# Patient Record
Sex: MALE | Race: WHITE | NOT HISPANIC OR LATINO | Employment: OTHER | URBAN - METROPOLITAN AREA
[De-identification: names, ages, dates, MRNs, and addresses within clinical notes are randomized per-mention and may not be internally consistent; named-entity substitution may affect disease eponyms.]

---

## 2023-04-20 LAB
ALANINE AMINOTRANSFERASE (SGPT) (U/L) IN SER/PLAS: 14 U/L (ref 10–52)
ALBUMIN (G/DL) IN SER/PLAS: 3.7 G/DL (ref 3.4–5)
ALKALINE PHOSPHATASE (U/L) IN SER/PLAS: 78 U/L (ref 33–136)
ANION GAP IN SER/PLAS: 11 MMOL/L (ref 10–20)
ASPARTATE AMINOTRANSFERASE (SGOT) (U/L) IN SER/PLAS: 20 U/L (ref 9–39)
BILIRUBIN TOTAL (MG/DL) IN SER/PLAS: 0.4 MG/DL (ref 0–1.2)
CALCIUM (MG/DL) IN SER/PLAS: 9.3 MG/DL (ref 8.6–10.3)
CARBON DIOXIDE, TOTAL (MMOL/L) IN SER/PLAS: 26 MMOL/L (ref 21–32)
CHLORIDE (MMOL/L) IN SER/PLAS: 104 MMOL/L (ref 98–107)
CHOLESTEROL (MG/DL) IN SER/PLAS: 177 MG/DL (ref 0–199)
CHOLESTEROL IN HDL (MG/DL) IN SER/PLAS: 41.9 MG/DL
CHOLESTEROL/HDL RATIO: 4.2
CREATININE (MG/DL) IN SER/PLAS: 0.8 MG/DL (ref 0.5–1.3)
ERYTHROCYTE DISTRIBUTION WIDTH (RATIO) BY AUTOMATED COUNT: 14.1 % (ref 11.5–14.5)
ERYTHROCYTE MEAN CORPUSCULAR HEMOGLOBIN CONCENTRATION (G/DL) BY AUTOMATED: 31.8 G/DL (ref 32–36)
ERYTHROCYTE MEAN CORPUSCULAR VOLUME (FL) BY AUTOMATED COUNT: 91 FL (ref 80–100)
ERYTHROCYTES (10*6/UL) IN BLOOD BY AUTOMATED COUNT: 4.92 X10E12/L (ref 4.5–5.9)
GFR MALE: >90 ML/MIN/1.73M2
GLUCOSE (MG/DL) IN SER/PLAS: 100 MG/DL (ref 74–99)
HEMATOCRIT (%) IN BLOOD BY AUTOMATED COUNT: 44.7 % (ref 41–52)
HEMOGLOBIN (G/DL) IN BLOOD: 14.2 G/DL (ref 13.5–17.5)
LDL: 111 MG/DL (ref 0–99)
LEUKOCYTES (10*3/UL) IN BLOOD BY AUTOMATED COUNT: 10 X10E9/L (ref 4.4–11.3)
PLATELETS (10*3/UL) IN BLOOD AUTOMATED COUNT: 435 X10E9/L (ref 150–450)
POTASSIUM (MMOL/L) IN SER/PLAS: 4 MMOL/L (ref 3.5–5.3)
PROTEIN TOTAL: 7.3 G/DL (ref 6.4–8.2)
SODIUM (MMOL/L) IN SER/PLAS: 137 MMOL/L (ref 136–145)
THYROTROPIN (MIU/L) IN SER/PLAS BY DETECTION LIMIT <= 0.05 MIU/L: 2.94 MIU/L (ref 0.44–3.98)
TRIGLYCERIDE (MG/DL) IN SER/PLAS: 122 MG/DL (ref 0–149)
UREA NITROGEN (MG/DL) IN SER/PLAS: 12 MG/DL (ref 6–23)
VLDL: 24 MG/DL (ref 0–40)

## 2023-04-21 LAB
ESTIMATED AVERAGE GLUCOSE FOR HBA1C: 134 MG/DL
HEMOGLOBIN A1C/HEMOGLOBIN TOTAL IN BLOOD: 6.3 %
PROSTATE SPECIFIC ANTIGEN,SCREEN: 2.31 NG/ML (ref 0–4)

## 2023-05-16 LAB
PROTEIN C ACTUAL/NORMAL IN PPP BY COAGULATION ASSAY: 115 %ACTIVITY (ref 70–150)
PROTEIN S ACTUAL/NORMAL IN PPP BY COAGULATION ASSAY: >150 %ACTIVITY (ref 64–150)

## 2023-05-19 LAB — ANTITHROMBIN ANTIGEN: 87 % (ref 82–136)

## 2023-08-19 LAB
ANION GAP IN SER/PLAS: 12 MMOL/L (ref 10–20)
CALCIUM (MG/DL) IN SER/PLAS: 9.2 MG/DL (ref 8.6–10.3)
CARBON DIOXIDE, TOTAL (MMOL/L) IN SER/PLAS: 24 MMOL/L (ref 21–32)
CHLORIDE (MMOL/L) IN SER/PLAS: 102 MMOL/L (ref 98–107)
CREATININE (MG/DL) IN SER/PLAS: 0.83 MG/DL (ref 0.5–1.3)
GFR MALE: >90 ML/MIN/1.73M2
GLUCOSE (MG/DL) IN SER/PLAS: 109 MG/DL (ref 74–99)
POTASSIUM (MMOL/L) IN SER/PLAS: 4 MMOL/L (ref 3.5–5.3)
SODIUM (MMOL/L) IN SER/PLAS: 134 MMOL/L (ref 136–145)
UREA NITROGEN (MG/DL) IN SER/PLAS: 14 MG/DL (ref 6–23)

## 2023-09-09 PROBLEM — I10 HYPERTENSION: Status: ACTIVE | Noted: 2023-09-09

## 2023-09-09 PROBLEM — I48.0 PAROXYSMAL ATRIAL FIBRILLATION (MULTI): Status: ACTIVE | Noted: 2023-09-09

## 2023-09-09 PROBLEM — R60.0 LEG EDEMA: Status: ACTIVE | Noted: 2023-09-09

## 2023-09-09 PROBLEM — R73.02 IMPAIRED GLUCOSE TOLERANCE: Status: ACTIVE | Noted: 2023-09-09

## 2023-09-09 PROBLEM — M54.50 LOW BACK PAIN WITH RADIATION: Status: ACTIVE | Noted: 2023-09-09

## 2023-09-09 PROBLEM — E66.01 MORBID OBESITY DUE TO EXCESS CALORIES (MULTI): Status: ACTIVE | Noted: 2023-09-09

## 2023-09-09 PROBLEM — M96.1 POSTLAMINECTOMY SYNDROME OF LUMBOSACRAL REGION: Status: ACTIVE | Noted: 2023-09-09

## 2023-09-09 PROBLEM — M51.369 DEGENERATION OF LUMBAR INTERVERTEBRAL DISC: Status: ACTIVE | Noted: 2023-09-09

## 2023-09-09 PROBLEM — G47.33 OBSTRUCTIVE SLEEP APNEA SYNDROME: Status: ACTIVE | Noted: 2023-09-09

## 2023-09-09 PROBLEM — M19.90 OSTEOARTHRITIS: Status: ACTIVE | Noted: 2023-09-09

## 2023-09-09 PROBLEM — M51.36 DEGENERATION OF LUMBAR INTERVERTEBRAL DISC: Status: ACTIVE | Noted: 2023-09-09

## 2023-09-09 PROBLEM — K21.9 GASTROESOPHAGEAL REFLUX DISEASE: Status: ACTIVE | Noted: 2023-09-09

## 2023-09-09 PROBLEM — R06.02 SOB (SHORTNESS OF BREATH): Status: ACTIVE | Noted: 2023-09-09

## 2023-09-09 PROBLEM — Z86.711 HISTORY OF PULMONARY EMBOLISM: Status: ACTIVE | Noted: 2023-09-09

## 2023-09-09 PROBLEM — F41.8 MIXED ANXIETY AND DEPRESSIVE DISORDER: Status: ACTIVE | Noted: 2023-09-09

## 2023-09-09 PROBLEM — F41.9 ANXIETY: Status: ACTIVE | Noted: 2023-09-09

## 2023-09-09 PROBLEM — F40.00 AGORAPHOBIA: Status: ACTIVE | Noted: 2023-09-09

## 2023-09-09 PROBLEM — N52.9 ERECTILE DYSFUNCTION: Status: ACTIVE | Noted: 2023-09-09

## 2023-09-09 PROBLEM — E78.2 MIXED HYPERLIPIDEMIA: Status: ACTIVE | Noted: 2023-09-09

## 2023-09-09 PROBLEM — M79.7 FIBROMYALGIA: Status: ACTIVE | Noted: 2023-09-09

## 2023-09-09 RX ORDER — ESCITALOPRAM OXALATE 20 MG/1
TABLET ORAL
COMMUNITY
Start: 2021-07-04 | End: 2023-12-28 | Stop reason: ALTCHOICE

## 2023-09-09 RX ORDER — LISINOPRIL 10 MG/1
10 TABLET ORAL DAILY
COMMUNITY
Start: 2018-11-26

## 2023-09-09 RX ORDER — IBUPROFEN 800 MG/1
800 TABLET ORAL 3 TIMES DAILY PRN
COMMUNITY
End: 2023-12-28 | Stop reason: ALTCHOICE

## 2023-09-09 RX ORDER — METOPROLOL TARTRATE 25 MG/1
25 TABLET, FILM COATED ORAL
COMMUNITY
Start: 2020-06-30

## 2023-09-09 RX ORDER — HYDROCODONE BITARTRATE AND ACETAMINOPHEN 5; 300 MG/1; MG/1
1 TABLET ORAL EVERY 6 HOURS
COMMUNITY
End: 2023-12-28 | Stop reason: ALTCHOICE

## 2023-09-09 RX ORDER — SERTRALINE HYDROCHLORIDE 100 MG/1
100 TABLET, FILM COATED ORAL DAILY
COMMUNITY
Start: 2022-05-12

## 2023-09-09 RX ORDER — OXYCODONE AND ACETAMINOPHEN 5; 325 MG/1; MG/1
1 TABLET ORAL EVERY 6 HOURS
COMMUNITY
Start: 2016-06-09 | End: 2023-12-28 | Stop reason: ALTCHOICE

## 2023-09-09 RX ORDER — PANTOPRAZOLE SODIUM 40 MG/1
1 TABLET, DELAYED RELEASE ORAL DAILY
COMMUNITY
End: 2023-12-28 | Stop reason: ALTCHOICE

## 2023-09-09 RX ORDER — BENZONATATE 200 MG/1
200 CAPSULE ORAL 3 TIMES DAILY PRN
COMMUNITY
End: 2023-12-28 | Stop reason: ALTCHOICE

## 2023-09-09 RX ORDER — CEPHALEXIN 500 MG/1
500 CAPSULE ORAL 2 TIMES DAILY
COMMUNITY
Start: 2016-06-09 | End: 2024-01-07 | Stop reason: HOSPADM

## 2023-09-09 RX ORDER — DIAZEPAM 5 MG/1
TABLET ORAL
COMMUNITY
Start: 2020-10-19 | End: 2023-12-28 | Stop reason: ALTCHOICE

## 2023-09-09 RX ORDER — AMOXICILLIN AND CLAVULANATE POTASSIUM 875; 125 MG/1; MG/1
875 TABLET, FILM COATED ORAL EVERY 12 HOURS
COMMUNITY
End: 2023-12-28 | Stop reason: ALTCHOICE

## 2023-09-09 RX ORDER — SULFAMETHOXAZOLE AND TRIMETHOPRIM 800; 160 MG/1; MG/1
TABLET ORAL
COMMUNITY
Start: 2021-06-30 | End: 2024-01-07 | Stop reason: HOSPADM

## 2023-09-09 RX ORDER — FLUTICASONE PROPIONATE 50 MCG
2 SPRAY, SUSPENSION (ML) NASAL DAILY
COMMUNITY
Start: 2022-06-22 | End: 2023-12-28 | Stop reason: ALTCHOICE

## 2023-09-09 RX ORDER — MONTELUKAST SODIUM 10 MG/1
1 TABLET ORAL DAILY
COMMUNITY
Start: 2022-06-22 | End: 2023-12-28 | Stop reason: ALTCHOICE

## 2023-09-09 RX ORDER — GABAPENTIN 600 MG/1
600 TABLET ORAL 3 TIMES DAILY
COMMUNITY
Start: 2021-07-05 | End: 2024-05-28

## 2023-09-09 RX ORDER — SILDENAFIL 100 MG/1
100 TABLET, FILM COATED ORAL DAILY PRN
COMMUNITY
Start: 2022-05-12

## 2023-09-12 ENCOUNTER — HOSPITAL ENCOUNTER (OUTPATIENT)
Dept: DATA CONVERSION | Facility: HOSPITAL | Age: 63
Discharge: HOME | End: 2023-09-12
Payer: COMMERCIAL

## 2023-09-12 DIAGNOSIS — Z79.899 OTHER LONG TERM (CURRENT) DRUG THERAPY: ICD-10-CM

## 2023-09-12 DIAGNOSIS — M54.50 LOW BACK PAIN, UNSPECIFIED: ICD-10-CM

## 2023-09-12 DIAGNOSIS — Z96.641 PRESENCE OF RIGHT ARTIFICIAL HIP JOINT: ICD-10-CM

## 2023-09-12 DIAGNOSIS — M54.16 RADICULOPATHY, LUMBAR REGION: ICD-10-CM

## 2023-09-12 DIAGNOSIS — Z79.01 LONG TERM (CURRENT) USE OF ANTICOAGULANTS: ICD-10-CM

## 2023-09-12 DIAGNOSIS — M25.551 PAIN IN RIGHT HIP: ICD-10-CM

## 2023-10-09 ENCOUNTER — APPOINTMENT (OUTPATIENT)
Dept: ORTHOPEDIC SURGERY | Facility: CLINIC | Age: 63
End: 2023-10-09
Payer: COMMERCIAL

## 2023-10-17 ENCOUNTER — APPOINTMENT (OUTPATIENT)
Dept: ORTHOPEDIC SURGERY | Facility: CLINIC | Age: 63
End: 2023-10-17
Payer: COMMERCIAL

## 2023-12-13 RX ORDER — POLYETHYLENE GLYCOL 3350, SODIUM CHLORIDE, SODIUM BICARBONATE, POTASSIUM CHLORIDE 420; 11.2; 5.72; 1.48 G/4L; G/4L; G/4L; G/4L
POWDER, FOR SOLUTION ORAL
Status: ON HOLD | COMMUNITY
Start: 2023-04-13 | End: 2024-01-04 | Stop reason: WASHOUT

## 2023-12-13 RX ORDER — POLYETHYLENE GLYCOL 3350, SODIUM SULFATE, SODIUM CHLORIDE, POTASSIUM CHLORIDE, SODIUM ASCORBATE, AND ASCORBIC ACID 7.5-2.691G
KIT ORAL
Status: ON HOLD | COMMUNITY
Start: 2023-04-10 | End: 2024-01-04 | Stop reason: WASHOUT

## 2023-12-13 RX ORDER — AMOXICILLIN 500 MG/1
CAPSULE ORAL
COMMUNITY
Start: 2023-08-08 | End: 2023-12-28 | Stop reason: ALTCHOICE

## 2023-12-13 RX ORDER — PREDNISONE 20 MG/1
TABLET ORAL
COMMUNITY
Start: 2023-09-02 | End: 2023-12-28 | Stop reason: ALTCHOICE

## 2023-12-13 RX ORDER — AZITHROMYCIN 500 MG/1
TABLET, FILM COATED ORAL
COMMUNITY
Start: 2023-03-11 | End: 2023-12-28 | Stop reason: ALTCHOICE

## 2023-12-13 RX ORDER — METHOCARBAMOL 750 MG/1
750 TABLET, FILM COATED ORAL 4 TIMES DAILY PRN
Status: ON HOLD | COMMUNITY
Start: 2023-09-02 | End: 2024-01-04 | Stop reason: WASHOUT

## 2023-12-13 RX ORDER — ACETAMINOPHEN 500 MG
1000 TABLET ORAL EVERY 8 HOURS PRN
COMMUNITY
Start: 2017-12-08

## 2023-12-22 PROCEDURE — 99284 EMERGENCY DEPT VISIT MOD MDM: CPT | Performed by: EMERGENCY MEDICINE

## 2023-12-22 PROCEDURE — 96365 THER/PROPH/DIAG IV INF INIT: CPT

## 2023-12-22 ASSESSMENT — PAIN DESCRIPTION - DESCRIPTORS: DESCRIPTORS: ACHING

## 2023-12-22 ASSESSMENT — PAIN DESCRIPTION - PAIN TYPE: TYPE: ACUTE PAIN

## 2023-12-22 ASSESSMENT — PAIN DESCRIPTION - ORIENTATION: ORIENTATION: LEFT

## 2023-12-22 ASSESSMENT — PAIN DESCRIPTION - LOCATION: LOCATION: LEG

## 2023-12-22 ASSESSMENT — PAIN - FUNCTIONAL ASSESSMENT: PAIN_FUNCTIONAL_ASSESSMENT: 0-10

## 2023-12-22 ASSESSMENT — PAIN SCALES - GENERAL: PAINLEVEL_OUTOF10: 9

## 2023-12-23 ENCOUNTER — HOSPITAL ENCOUNTER (EMERGENCY)
Facility: HOSPITAL | Age: 63
Discharge: HOME | End: 2023-12-23
Attending: EMERGENCY MEDICINE
Payer: COMMERCIAL

## 2023-12-23 ENCOUNTER — APPOINTMENT (OUTPATIENT)
Dept: RADIOLOGY | Facility: HOSPITAL | Age: 63
End: 2023-12-23
Payer: COMMERCIAL

## 2023-12-23 VITALS
BODY MASS INDEX: 37.11 KG/M2 | DIASTOLIC BLOOD PRESSURE: 82 MMHG | HEART RATE: 52 BPM | HEIGHT: 73 IN | OXYGEN SATURATION: 99 % | WEIGHT: 280 LBS | RESPIRATION RATE: 16 BRPM | TEMPERATURE: 97.3 F | SYSTOLIC BLOOD PRESSURE: 143 MMHG

## 2023-12-23 DIAGNOSIS — R79.89 ELEVATED D-DIMER: ICD-10-CM

## 2023-12-23 DIAGNOSIS — L03.116 CELLULITIS OF LEFT LOWER EXTREMITY: Primary | ICD-10-CM

## 2023-12-23 LAB
ALBUMIN SERPL BCP-MCNC: 3.6 G/DL (ref 3.4–5)
ALP SERPL-CCNC: 66 U/L (ref 33–136)
ALT SERPL W P-5'-P-CCNC: 12 U/L (ref 10–52)
ANION GAP SERPL CALC-SCNC: 11 MMOL/L (ref 10–20)
AST SERPL W P-5'-P-CCNC: 22 U/L (ref 9–39)
BASOPHILS # BLD AUTO: 0.06 X10*3/UL (ref 0–0.1)
BASOPHILS NFR BLD AUTO: 0.7 %
BILIRUB SERPL-MCNC: 0.4 MG/DL (ref 0–1.2)
BUN SERPL-MCNC: 20 MG/DL (ref 6–23)
CALCIUM SERPL-MCNC: 8.8 MG/DL (ref 8.6–10.3)
CHLORIDE SERPL-SCNC: 104 MMOL/L (ref 98–107)
CO2 SERPL-SCNC: 25 MMOL/L (ref 21–32)
CREAT SERPL-MCNC: 0.84 MG/DL (ref 0.5–1.3)
D DIMER PPP FEU-MCNC: 1094 NG/ML FEU
EOSINOPHIL # BLD AUTO: 0.22 X10*3/UL (ref 0–0.7)
EOSINOPHIL NFR BLD AUTO: 2.5 %
ERYTHROCYTE [DISTWIDTH] IN BLOOD BY AUTOMATED COUNT: 13.6 % (ref 11.5–14.5)
GFR SERPL CREATININE-BSD FRML MDRD: >90 ML/MIN/1.73M*2
GLUCOSE SERPL-MCNC: 110 MG/DL (ref 74–99)
HCT VFR BLD AUTO: 39.6 % (ref 41–52)
HGB BLD-MCNC: 12.7 G/DL (ref 13.5–17.5)
IMM GRANULOCYTES # BLD AUTO: 0.06 X10*3/UL (ref 0–0.7)
IMM GRANULOCYTES NFR BLD AUTO: 0.7 % (ref 0–0.9)
LYMPHOCYTES # BLD AUTO: 2.48 X10*3/UL (ref 1.2–4.8)
LYMPHOCYTES NFR BLD AUTO: 27.6 %
MCH RBC QN AUTO: 28.9 PG (ref 26–34)
MCHC RBC AUTO-ENTMCNC: 32.1 G/DL (ref 32–36)
MCV RBC AUTO: 90 FL (ref 80–100)
MONOCYTES # BLD AUTO: 1.2 X10*3/UL (ref 0.1–1)
MONOCYTES NFR BLD AUTO: 13.4 %
NEUTROPHILS # BLD AUTO: 4.95 X10*3/UL (ref 1.2–7.7)
NEUTROPHILS NFR BLD AUTO: 55.1 %
NRBC BLD-RTO: 0 /100 WBCS (ref 0–0)
PLATELET # BLD AUTO: 387 X10*3/UL (ref 150–450)
POTASSIUM SERPL-SCNC: 3.9 MMOL/L (ref 3.5–5.3)
PROT SERPL-MCNC: 7.4 G/DL (ref 6.4–8.2)
RBC # BLD AUTO: 4.39 X10*6/UL (ref 4.5–5.9)
SODIUM SERPL-SCNC: 136 MMOL/L (ref 136–145)
WBC # BLD AUTO: 9 X10*3/UL (ref 4.4–11.3)

## 2023-12-23 PROCEDURE — 85379 FIBRIN DEGRADATION QUANT: CPT | Performed by: EMERGENCY MEDICINE

## 2023-12-23 PROCEDURE — 2500000004 HC RX 250 GENERAL PHARMACY W/ HCPCS (ALT 636 FOR OP/ED): Performed by: EMERGENCY MEDICINE

## 2023-12-23 PROCEDURE — 85025 COMPLETE CBC W/AUTO DIFF WBC: CPT | Performed by: EMERGENCY MEDICINE

## 2023-12-23 PROCEDURE — 2500000001 HC RX 250 WO HCPCS SELF ADMINISTERED DRUGS (ALT 637 FOR MEDICARE OP): Performed by: EMERGENCY MEDICINE

## 2023-12-23 PROCEDURE — 93970 EXTREMITY STUDY: CPT

## 2023-12-23 PROCEDURE — 93971 EXTREMITY STUDY: CPT | Performed by: RADIOLOGY

## 2023-12-23 PROCEDURE — 36415 COLL VENOUS BLD VENIPUNCTURE: CPT | Performed by: EMERGENCY MEDICINE

## 2023-12-23 PROCEDURE — 84132 ASSAY OF SERUM POTASSIUM: CPT | Performed by: EMERGENCY MEDICINE

## 2023-12-23 RX ORDER — CEFAZOLIN 2 G/1
INJECTION, POWDER, FOR SOLUTION INTRAMUSCULAR; INTRAVENOUS
Status: DISCONTINUED
Start: 2023-12-23 | End: 2023-12-23 | Stop reason: HOSPADM

## 2023-12-23 RX ORDER — SULFAMETHOXAZOLE AND TRIMETHOPRIM 800; 160 MG/1; MG/1
1 TABLET ORAL EVERY 12 HOURS
Qty: 20 TABLET | Refills: 0 | Status: SHIPPED | OUTPATIENT
Start: 2023-12-23 | End: 2024-01-07 | Stop reason: HOSPADM

## 2023-12-23 RX ORDER — CEPHALEXIN 500 MG/1
500 CAPSULE ORAL 4 TIMES DAILY
Qty: 40 CAPSULE | Refills: 0 | Status: SHIPPED | OUTPATIENT
Start: 2023-12-23 | End: 2024-01-07 | Stop reason: HOSPADM

## 2023-12-23 RX ORDER — HYDROCODONE BITARTRATE AND ACETAMINOPHEN 5; 325 MG/1; MG/1
1 TABLET ORAL ONCE
Status: COMPLETED | OUTPATIENT
Start: 2023-12-23 | End: 2023-12-23

## 2023-12-23 RX ADMIN — Medication 2 G: at 00:20

## 2023-12-23 RX ADMIN — HYDROCODONE BITARTRATE AND ACETAMINOPHEN 1 TABLET: 5; 325 TABLET ORAL at 02:26

## 2023-12-23 ASSESSMENT — PAIN SCALES - GENERAL: PAINLEVEL_OUTOF10: 7

## 2023-12-23 ASSESSMENT — COLUMBIA-SUICIDE SEVERITY RATING SCALE - C-SSRS
2. HAVE YOU ACTUALLY HAD ANY THOUGHTS OF KILLING YOURSELF?: NO
1. IN THE PAST MONTH, HAVE YOU WISHED YOU WERE DEAD OR WISHED YOU COULD GO TO SLEEP AND NOT WAKE UP?: NO
6. HAVE YOU EVER DONE ANYTHING, STARTED TO DO ANYTHING, OR PREPARED TO DO ANYTHING TO END YOUR LIFE?: NO

## 2023-12-23 ASSESSMENT — PAIN DESCRIPTION - LOCATION: LOCATION: LEG

## 2023-12-23 ASSESSMENT — PAIN DESCRIPTION - ORIENTATION: ORIENTATION: LEFT

## 2023-12-23 ASSESSMENT — PAIN DESCRIPTION - DESCRIPTORS: DESCRIPTORS: ACHING

## 2023-12-23 ASSESSMENT — PAIN - FUNCTIONAL ASSESSMENT: PAIN_FUNCTIONAL_ASSESSMENT: 0-10

## 2023-12-23 NOTE — ED PROVIDER NOTES
HPI   Chief Complaint   Patient presents with    Leg Pain     Pt states that his left leg swelled and began getting painful on Wednesday, it is worse tonight. Pt has a hx of DVT's and is on eliquis. He states he does not get good circulation to his legs due to back surgeries.       63-year-old male with past medical history significant for DVT and PE on Eliquis, history of hypertension, peripheral vascular disease, multiple back surgeries presents to the emergency department complaining of left lower leg pain.  Patient states that it started Wednesday with redness and has continued getting worse.  Denies any calf pain, shortness of breath.  States that he has had cellulitis in the past and it feels like it.  Denies any chest pain or shortness of breath, fevers or chills or any other complaints.                          Homeworth Coma Scale Score: 15                  Patient History   History reviewed. No pertinent past medical history.  History reviewed. No pertinent surgical history.  Family History   Problem Relation Name Age of Onset    Mental illness Mother      Hypertension Mother      Diabetes Mother      Peripheral vascular disease Father      Diabetes Father      Hypertension Father      No Known Problems Sister      No Known Problems Daughter      No Known Problems Son      Diabetes Other fam hx     Heart disease Other fam hx     Stroke Other fam hx     Hypertension Other fam hx     Cancer Other fam hx      Social History     Tobacco Use    Smoking status: Not on file    Smokeless tobacco: Not on file   Substance Use Topics    Alcohol use: Not on file    Drug use: Not on file       Physical Exam   ED Triage Vitals [12/22/23 2359]   Temp Heart Rate Resp BP   36.3 °C (97.3 °F) 68 16 146/64      SpO2 Temp Source Heart Rate Source Patient Position   96 % Temporal Monitor Sitting      BP Location FiO2 (%)     -- --       Physical Exam  HENT:      Head: Normocephalic and atraumatic.   Eyes:      Extraocular  Movements: Extraocular movements intact.      Pupils: Pupils are equal, round, and reactive to light.   Cardiovascular:      Rate and Rhythm: Normal rate and regular rhythm.   Pulmonary:      Effort: Pulmonary effort is normal.      Breath sounds: Normal breath sounds.   Abdominal:      Palpations: Abdomen is soft.      Tenderness: There is no abdominal tenderness.   Musculoskeletal:         General: Normal range of motion.   Skin:     General: Skin is warm and dry.      Comments: Left lower extremity with area of erythema in the area of the ankle and extending proximally under the calf.  Also extends towards the foot.  Tenderness to palpation.  There is no drainage.  No purulent discharge.  There are venous stasis changes.  Right lower extremity with venous stasis changes without evidence of infection.  No calf swelling or tenderness bilaterally.   Neurological:      General: No focal deficit present.      Mental Status: He is alert and oriented to person, place, and time.   Psychiatric:         Behavior: Behavior normal.         ED Course & [facility]   ED Course as of 12/24/23 0824   Sat Dec 23, 2023   0935 Venous Duplex US:  IMPRESSION:  No sonographic evidence for deep vein thrombosis within the evaluated  veins of the lower extremities bilaterally.   [EC]      ED Course User Index  [EC] Geovanny No DO         Diagnoses as of 12/24/23 0824   Cellulitis of left lower extremity   Elevated d-dimer       Medical Decision Making  Patient presents with left lower extremity redness and swelling in the area of the left ankle.  History of peripheral vascular disease and venous stasis changes.  Differential includes cellulitis versus deep vein thrombosis.  There is clinically cellulitis but the D-dimer is elevated and the patient has had previous deep vein thrombosis.  He is on Eliquis which puts him at less risk since he has been compliant.  With the elevated D-dimer the patient will be held for ultrasound to rule out  DVT.  He has been started on IV antibiotics.  Blood work which I reviewed and interpreted reveals a normal white count.  His electrolytes are all within reasonable range.  Glucose is 110.  Renal functions are normal.  If ultrasound negative plan for discharge on oral antibiotics.      Labs Reviewed   CBC WITH AUTO DIFFERENTIAL - Abnormal       Result Value    WBC 9.0      nRBC 0.0      RBC 4.39 (*)     Hemoglobin 12.7 (*)     Hematocrit 39.6 (*)     MCV 90      MCH 28.9      MCHC 32.1      RDW 13.6      Platelets 387      Neutrophils % 55.1      Immature Granulocytes %, Automated 0.7      Lymphocytes % 27.6      Monocytes % 13.4      Eosinophils % 2.5      Basophils % 0.7      Neutrophils Absolute 4.95      Immature Granulocytes Absolute, Automated 0.06      Lymphocytes Absolute 2.48      Monocytes Absolute 1.20 (*)     Eosinophils Absolute 0.22      Basophils Absolute 0.06     COMPREHENSIVE METABOLIC PANEL - Abnormal    Glucose 110 (*)     Sodium 136      Potassium 3.9      Chloride 104      Bicarbonate 25      Anion Gap 11      Urea Nitrogen 20      Creatinine 0.84      eGFR >90      Calcium 8.8      Albumin 3.6      Alkaline Phosphatase 66      Total Protein 7.4      AST 22      Bilirubin, Total 0.4      ALT 12     D-DIMER, NON VTE - Abnormal    D-Dimer Non VTE, Quant (ng/mL FEU) 1,094 (*)     Narrative:     The D-Dimer assay is reported in ng/mL Fibrinogen Equivalent Units (FEU). The results of this assay should NOT be used for the exclusion of Deep Vein Thrombosis and/or Pulmonary Embolism.     Transfer of care note.  Transfer care to Dr. Roblero at 8 AM change of shift.  History physical discussed.  Patient pending left lower extremity ultrasound to rule out deep vein thrombosis.  He has known left lower extremity cellulitis.  He is already on Eliquis for previous DVTs.  Plan to discharge if negative on oral antibiotics.    Tentative impression  Left lower extremity cellulitis  History of pulmonary  embolism    Plan to discharge on Bactrim and Keflex.  Procedure  Procedures     John Reed MD  12/23/23 0755       John Reed MD  12/24/23 [id number]

## 2023-12-23 NOTE — PROGRESS NOTES
Emergency Medicine Transition of Care Note.    I received Rome Mullins in signout from Dr. HARJEET Reed.  Please see the previous ED provider note for all HPI, PE and MDM up to the time of signout at 0700. This is in addition to the primary record.    In brief Rome Mullins is an 63 y.o. male presenting for   Chief Complaint   Patient presents with    Leg Pain     Pt states that his left leg swelled and began getting painful on Wednesday, it is worse tonight. Pt has a hx of DVT's and is on eliquis. He states he does not get good circulation to his legs due to back surgeries.     At the time of signout we were awaiting: Venous duplex ultrasound of extremity    ED Course as of 12/23/23 0936   Sat Dec 23, 2023   0935 Venous Duplex US:  IMPRESSION:  No sonographic evidence for deep vein thrombosis within the evaluated  veins of the lower extremities bilaterally.   [EC]      ED Course User Index  [EC] Geovanny No DO         Diagnoses as of 12/23/23 0936   Cellulitis of left lower extremity   Elevated d-dimer       Medical Decision Making  Patient is thought to have cellulitis.  Was awaiting venous duplex ultrasound lower extremities.  Venous duplex ultrasound is negative.  Dr. HARJEET Reed has prescribed outpatient antibiotics.  Patient to continue routine medications and care    Follow up with PCP this week for recheck  RETURN IF WORSE        Final diagnoses:   [L03.116] Cellulitis of left lower extremity   [R79.89] Elevated d-dimer           Procedure  Procedures    Geovanny No DO

## 2023-12-28 ENCOUNTER — OFFICE VISIT (OUTPATIENT)
Dept: HEMATOLOGY/ONCOLOGY | Facility: HOSPITAL | Age: 63
End: 2023-12-28
Payer: COMMERCIAL

## 2023-12-28 ENCOUNTER — LAB (OUTPATIENT)
Dept: LAB | Facility: LAB | Age: 63
End: 2023-12-28
Payer: COMMERCIAL

## 2023-12-28 VITALS
OXYGEN SATURATION: 95 % | TEMPERATURE: 98.4 F | HEART RATE: 63 BPM | RESPIRATION RATE: 18 BRPM | WEIGHT: 271.61 LBS | SYSTOLIC BLOOD PRESSURE: 123 MMHG | BODY MASS INDEX: 36 KG/M2 | DIASTOLIC BLOOD PRESSURE: 74 MMHG | HEIGHT: 73 IN

## 2023-12-28 DIAGNOSIS — D64.9 ANEMIA, UNSPECIFIED TYPE: ICD-10-CM

## 2023-12-28 DIAGNOSIS — R97.20 ELEVATED PSA: ICD-10-CM

## 2023-12-28 DIAGNOSIS — I10 HYPERTENSION, UNSPECIFIED TYPE: ICD-10-CM

## 2023-12-28 DIAGNOSIS — I48.0 PAROXYSMAL ATRIAL FIBRILLATION (MULTI): ICD-10-CM

## 2023-12-28 DIAGNOSIS — R31.9 HEMATURIA, UNSPECIFIED TYPE: ICD-10-CM

## 2023-12-28 DIAGNOSIS — K63.5 POLYP OF COLON, UNSPECIFIED PART OF COLON, UNSPECIFIED TYPE: ICD-10-CM

## 2023-12-28 DIAGNOSIS — Z86.711 HISTORY OF PULMONARY EMBOLISM: Primary | ICD-10-CM

## 2023-12-28 LAB
BASOPHILS # BLD AUTO: 0.08 X10*3/UL (ref 0–0.1)
BASOPHILS NFR BLD AUTO: 1.1 %
EOSINOPHIL # BLD AUTO: 0.26 X10*3/UL (ref 0–0.7)
EOSINOPHIL NFR BLD AUTO: 3.6 %
ERYTHROCYTE [DISTWIDTH] IN BLOOD BY AUTOMATED COUNT: 13.6 % (ref 11.5–14.5)
FERRITIN SERPL-MCNC: 260 NG/ML (ref 20–300)
FOLATE SERPL-MCNC: 10.7 NG/ML
HCT VFR BLD AUTO: 44.2 % (ref 41–52)
HGB BLD-MCNC: 14.3 G/DL (ref 13.5–17.5)
IMM GRANULOCYTES # BLD AUTO: 0.05 X10*3/UL (ref 0–0.7)
IMM GRANULOCYTES NFR BLD AUTO: 0.7 % (ref 0–0.9)
IRON SATN MFR SERPL: 27 % (ref 25–45)
IRON SERPL-MCNC: 86 UG/DL (ref 35–150)
LYMPHOCYTES # BLD AUTO: 1.66 X10*3/UL (ref 1.2–4.8)
LYMPHOCYTES NFR BLD AUTO: 23.1 %
MCH RBC QN AUTO: 28.8 PG (ref 26–34)
MCHC RBC AUTO-ENTMCNC: 32.4 G/DL (ref 32–36)
MCV RBC AUTO: 89 FL (ref 80–100)
MONOCYTES # BLD AUTO: 0.63 X10*3/UL (ref 0.1–1)
MONOCYTES NFR BLD AUTO: 8.8 %
NEUTROPHILS # BLD AUTO: 4.5 X10*3/UL (ref 1.2–7.7)
NEUTROPHILS NFR BLD AUTO: 62.7 %
NRBC BLD-RTO: 0 /100 WBCS (ref 0–0)
PLATELET # BLD AUTO: 434 X10*3/UL (ref 150–450)
RBC # BLD AUTO: 4.97 X10*6/UL (ref 4.5–5.9)
TIBC SERPL-MCNC: 320 UG/DL (ref 240–445)
UIBC SERPL-MCNC: 234 UG/DL (ref 110–370)
VIT B12 SERPL-MCNC: 333 PG/ML (ref 211–911)
WBC # BLD AUTO: 7.2 X10*3/UL (ref 4.4–11.3)

## 2023-12-28 PROCEDURE — 3078F DIAST BP <80 MM HG: CPT | Performed by: INTERNAL MEDICINE

## 2023-12-28 PROCEDURE — 3074F SYST BP LT 130 MM HG: CPT | Performed by: INTERNAL MEDICINE

## 2023-12-28 PROCEDURE — 99214 OFFICE O/P EST MOD 30 MIN: CPT | Performed by: INTERNAL MEDICINE

## 2023-12-28 PROCEDURE — 85025 COMPLETE CBC W/AUTO DIFF WBC: CPT

## 2023-12-28 PROCEDURE — 82607 VITAMIN B-12: CPT

## 2023-12-28 PROCEDURE — 83550 IRON BINDING TEST: CPT

## 2023-12-28 PROCEDURE — 83540 ASSAY OF IRON: CPT

## 2023-12-28 PROCEDURE — 82728 ASSAY OF FERRITIN: CPT

## 2023-12-28 PROCEDURE — 36415 COLL VENOUS BLD VENIPUNCTURE: CPT

## 2023-12-28 PROCEDURE — 82746 ASSAY OF FOLIC ACID SERUM: CPT

## 2023-12-28 ASSESSMENT — PAIN SCALES - GENERAL: PAINLEVEL: 6

## 2023-12-28 NOTE — PROGRESS NOTES
Interval History:    Patient is a 63 -year-old white man referred for history of thrombosis:  1. 2002 DVT distal left leg and PE after back surgery in 2002  treated with Lovenox transitioning to warfarin for 3 months and also had an IVC filter placed for unclear reasons (was not critically  ill and no contraindication to anticoagulation).  2. After about a week of flulike symptoms and coughing he had chest pain/shortness of breath with  3/9/2023 CT chest showed pulmonary emboli involving segmental and subsegmental pulmonary arteries supplying the left lower lobe, started on Eliquis. was seen by Dr. Donnelly inpatient, treated for  pneumonia March 2023; no recent travel, injury, surgery, hormonal agents, or other provoking event.  Dr. Donnelly's inpatient note from March 2023: Noted difficulty regulating warfarin status post filter, paroxysmal atrial fibrillation, venous stasis, sciatica.  Felt  to be unprovoked PE left lower lobe on Lovenox, ordered antiphospholipid antibody evaluation, recommend long-term management with Eliquis to be held 2 days before any procedures and resume as soon as medically feasible.     I was not aware of patient's paroxysmal atrial fibrillation diagnosis at his appointment with me on 5/15/2023, 8/24/2023 told me that he was diagnosed with this in 2015 but never followed with a cardiologist outpatient, was not recommended to be on anticoagulation from cardiology,  had chronic intermittent palpitations.  12/28/2023 he still has not seen a cardiologist, still has mild rare intermittent palpitations.     Patient gets some of his care at [facility], information not entirely in our system.  PCP is Victoria Quiroz at an outside facility, notes not available-PCP now supplies his Eliquis.   CT chest 8/21/2023 showed no pulmonary embolism, ordered by his pulmonologist for routine follow-up.    Patient had an extensive hospitalization for COVID in 12/2021 subsequently on oxygen also VICENTE on  CPAP, also treated for postnasal drip, followed by Eda Mcgowan 3/15/2023 noting recent PE on Eliquis with a CT ordered in 6 months, referred  to hematology-oncology, also monitoring lung nodules; he saw her most recently 9/27/2023 recommending CPAP, oxygen, Flonase, Eliquis, cardiology for atrial fibrillation.  Patient uses oxygen at night only now.  Patient is by himself today.  He lives with his wife and son, grandson also intermittently lives with  him, is active.  He has chronic back and hip pain with a history of orthopedic surgeries, previously seen by pain management but no longer, gets gabapentin from PCP, had a flare of his hip pain 9/2023 with x-ray showing no acute process but he reports he was given steroids, went to see an orthopedic in October 2023 but was told that that orthopedic provider did not do hips that had previously had surgery done on them, overall pain doing better now.  Patient has a history of colon polyps and was planned for colonoscopy June 2023 but per patient they had to get some sort of clearance regarding his oxygen and finally has an appointment with someone for a colonoscopy evaluation January 2024, did have blood in his stool 3/2023 when he was constipated after his hospitalization, had this before rarely, 8/24/2023 reported  blood in the stool and toilet about once a month  since March 2023 and believed it was related to his hemorrhoid, 12/28/2023 he reports no recent blood in his stool.  Patient has chronic leg edema since his back surgery in 2007, stable.  Patient has chronic numbness and tingling in his knees to his toes since his surgery in 2007, stable.    November 2023 patient had an episode of hematuria and saw Dr. Lo at [facility], also elevated PSA, CTU 12/14/2023 unremarkable with no hepatosplenomegaly, cystoscopy 12/20/2023 with mild urethritis treated with Cipro and UA with trace blood; patient reported he only had the 1 episode of the hematuria, does have ongoing follow-up  with urology.  12/23/2023 he was in the ER for left ankle redness and pain, ultrasound no thrombosis, was diagnosed with cellulitis and given Bactrim and Keflex with improvement in his symptoms.  Patient lost about 10 kg since I last saw him which she relates to due to the activity of the holidays as well as stress from his recent medical issues.  He tried using LISA hose but had an issue with one of his toenails and is going to see podiatry about this.  He had a URI treated with antibiotics from an outside facility November 2023 with symptoms subsequently improved.        Patient received the fall 2022 flu vaccine and COVID vaccines x2 most recently 5/2022 but no vaccine since.  12/28/2023 again I recommended ongoing vaccine management through PCP.  I have reviewed the available laboratory data, radiographic data, medications, and notes, and have summarized them in this note 12/28/2023.  No other hospitalizations, major illness, or since his last visit with me on 8/24/2023.     No fevers, drenching sweats, unintentional weight loss but weight loss as above, unusual headaches, sore throat, acute vision changes, chest pain  now, shortness breath now, cough  now, nausea or vomiting, abnormal bowel movements, abdominal pain,  dysuria or hematuria, pain or neurologic symptoms except as above , rashes or lumps, abnormal bruising or bleeding except as above, diabetes  (although has prediabetes, does not check his glucose) or thyroid disorder.  His mood is controlled on his medications .    Epic transition occurred 10/2/2023 from prior EMR system.  I did not go back in the epic system prior to this point unless patient brought up an issue.  I did review the EMR data in epic from 10/2/2023 going forward, but relied on our old EMR system for data prior to the transition.    Hematology-oncology history (reviewed and updated 12/28/2023)   -2018: WBC 8s, hemoglobin 14s, platelets 300s  -April-May 2022: WBC 8.2, hemoglobin 13.6,  MCV 92, platelets 418.  Sodium 135, potassium 4.3, creatinine 0.7, calcium 9.2, normal LFTs.  Normal testosterone.   PSA 4.5.  -7/2022 CT chest without contrast showed interval resolution of pneumonic consolidations with presence of diffuse mild scattered groundglass opacities could be bronchiolitis, nonspecific pulmonary nodules, consider follow-up in 1 year, no  adenopathy, small hiatal hernia  -March 2023: WBC 9.5-11.4, hemoglobin 13.8-12.9, platelets 413-448.  Negative anticardiolipin and beta-2 glycoprotein antibodies IgG/IgA/IgM, DRVVT.  LVEF normal.  Leg ultrasound no DVT.  -3/9/2023 CT chest  showed pulmonary emboli involving segmental and subsegmental pulmonary arteries supplying the left lower lobe, platelike airspace consolidation left lower lobe likely atelectasis, multiple areas of groundglass opacity could be atypical infection   -April 2023: WBC 10, hemoglobin 14.2, MCV 91, platelet 435.  Normal sodium, potassium 4, creatinine 0.8, calcium 9.3, normal LFTs.  PSA 2.3.  Normal TSH.  Hemoglobin A1c 6.3.  -I initially saw the patient on 5/15/2023 regarding  thrombosis, information per HPI. Dr. Donnelly's inpatient note from March 2023 additional  evaluation and information:  Noted difficulty regulating warfarin status post filter, paroxysmal atrial fibrillation, venous stasis, sciatica.  Felt to be unprovoked PE left lower lobe on Lovenox, ordered antiphospholipid antibody evaluation, recommend  long-term management with Eliquis to be held 2 days before any procedures and resume as soon as medically feasible.  At patient's initial appointment with me, I was not aware of his atrial fibrillation diagnosis.   -5/15/2023: Protein S functional greater than 150, protein C functional 115, Antithrombin III 87, negative next-generation sequencing myeloid panel.   -8/19/2023: Sodium 134, potassium 4, creatinine 0.8, calcium 9.2   -8/21/2023 CT angio for PE by Eda Mcgowan: No evidence of PE, interval resolution of  previous emboli, slightly prominent mediastinal lymph nodes no size given, groundglass appearance lung stable  -12/23/2023: WBC 9, hemoglobin 12.7, normal MCV, platelet 387, monocytes 1.2.  Normal sodium, potassium 3.9, creatinine 0.8, normal calcium and LFTs.  D-dimer elevated 1094.        Past medical history  -Pulmonary embolism and left distal DVT 2002  after back surgery  -PE March 2023 also treated for pneumonia  -Dyslipidemia  -Elevated PSA 10.9 in 2013, 3.1 in 2014, 1.8 in 2018, 4.5 in April 2022, 2.3 in April 2023-as of 5/15/2023 no prostate biopsy but  followed by urology  -Elevated hemoglobin A1c, glucose intolerance   -Panic attacks and depression treated with Zoloft  -Peripheral vascular disease.  8/2021 vascular studies with reflux in the vasculature  on the left with no DVT, seen by vascular surgery 7/2021 noting left leg swelling despite antibiotics for cellulitis, history of DVT/PE in 2002 after back surgery-compression stockings recommended.  -Former smoker  -VICENTE confirmed again 7/2022  -7/2022 CT chest without contrast showed interval resolution of pneumonic consolidations with presence of diffuse mild scattered groundglass opacities could be bronchiolitis, nonspecific pulmonary nodules, consider follow-up in 1 year, no adenopathy,  small hiatal hernia  -3/2023 LVEF 55-60%.  -Chronic back pain, fibromyalgia, postlaminectomy syndrome, treated with gabapentin by pain management (only pain management appointment in our system was from 8/2021)  -Post-COVID respiratory issues  and cough. 12/2021 extensive hospitalization for COVID sent home with oxygen, seen by Eda Mcgowan 6/2022 noting to be on 4 L, also noted postnasal drip, ongoing cough, history of VICENTE but not recently on CPAP.  PFTs with restriction 9/2022.   VICENTE 7/2022 again.  -Chronic urinary symptoms related to low back surgery followed by urology mentioned in the chart but patient  denied 5/15/2023.   -1970s collapsed lung after a rib fracture  treated with a chest tube   -Hypertension   -Colon polyps 2018 with 5-year follow-up recommended, also hemorrhoids   -Venous stasis   -Sciatica  -Hyponatremia August 2023   -Paroxysmal atrial fibrillation according to patient diagnosed in 2015 not recommended to be on blood thinners at that time for unclear reasons (patient says that these were never discussed)  -Cellulitis right leg 2015 and left leg 12/2023  -November 2023 hematuria and elevated PSA, unremarkable CT urogram, 12/20/2023 cystoscopy with mild urethritis treated with Cipro     Past surgical history  Bilateral hip replacement, spine stimulator 2004 -emoved in 2019, [place] filter 2002, hernia repair  x2, lumbar surgery x3 most recently 2007, 1 cervical spine surgery 2020, knee surgery, chest tube 1970s after collapsed lung from  rib fracture, cystoscopy 12/20/2023     Social history  Tobacco: Quit 1989, smoked 15 pack years.   Alcohol: Few drinks per month.  Drugs: Denied.     Family history  No first-degree relatives with malignancy except  father with prostate cancer.  No family history of blood clots.    Allergies   Allergen Reactions    Fentanyl Palpitations and Unknown     rapid heart rate    Duloxetine GI Upset    Venlafaxine GI Upset    Methadone Palpitations     Current Outpatient Medications on File Prior to Visit   Medication Sig Dispense Refill    acetaminophen (Tylenol) 500 mg tablet Take 2 tablets (1,000 mg) by mouth every 8 hours if needed.      apixaban (Eliquis) 5 mg tablet Take 1 tablet (5 mg) by mouth 2 times a day. with or without food      cephalexin (Keflex) 500 mg capsule Take 1 capsule (500 mg) by mouth twice a day.      cephalexin (Keflex) 500 mg capsule Take 1 capsule (500 mg) by mouth 4 times a day for 10 days. 40 capsule 0    gabapentin (Neurontin) 600 mg tablet Take 1 tablet (600 mg) by mouth 3 times a day.      lisinopril 10 mg tablet Take 1 tablet (10 mg) by mouth once daily.      methocarbamol (Robaxin) 750 mg tablet  Take 1 tablet (750 mg) by mouth 4 times a day as needed.      metoprolol tartrate (Lopressor) 25 mg tablet Take 1 tablet (25 mg) by mouth 2 times a day with meals.      peg-sod sul-NaCl-KCl-asb-C (MoviPrep) 100-7.5-2.691 gram solution Use as directed      polyethylene glycol-electrolytes 420 gram solution Mix as directed and take 4 000ml by mouth 1 time only for 1 dose.      sertraline (Zoloft) 100 mg tablet Take 1 tablet (100 mg) by mouth once daily.      sildenafil (Viagra) 100 mg tablet Take 1 tablet (100 mg) by mouth once daily as needed.      sulfamethoxazole-trimethoprim (Bactrim DS) 800-160 mg tablet Sulfamethoxazole-Trimethoprim 800-160 MG Oral Tablet   Quantity: 20  Refills: 0        Start : 30-Jun-2021   Active      sulfamethoxazole-trimethoprim (Bactrim DS) 800-160 mg tablet Take 1 tablet by mouth every 12 hours for 10 days. 20 tablet 0    [DISCONTINUED] amoxicillin (Amoxil) 500 mg capsule take 2 capsules by mouth immediately  then take 1 capsule every 8 hours until gone.      [DISCONTINUED] amoxicillin-pot clavulanate (Augmentin) 875-125 mg tablet Take 1 tablet (875 mg) by mouth every 12 hours.      [DISCONTINUED] azithromycin (Zithromax) 500 mg tablet       [DISCONTINUED] benzonatate (Tessalon) 200 mg capsule 1 capsule (200 mg) 3 times a day as needed.      [DISCONTINUED] diazePAM (Valium) 5 mg tablet diazePAM 5 MG Oral Tablet   Quantity: 14  Refills: 0        Start : 19-Oct-2020   Active      [DISCONTINUED] doxylamine-DM-acetaminophen (Nyquil) 12.5- mg/30 mL liquid liquid Take 30 mL by mouth every 6 hours.      [DISCONTINUED] escitalopram (Lexapro) 20 mg tablet Escitalopram Oxalate 20 MG Oral Tablet   Quantity: 30  Refills: 0        Start : 4-Jul-2021   Active      [DISCONTINUED] fluticasone (Flonase) 50 mcg/actuation nasal spray Administer 2 sprays into each nostril once daily.      [DISCONTINUED] HYDROcodone-acetaminophen (Vicodin) 5-300 mg tablet Take 1 tablet by mouth every 6 hours.       [DISCONTINUED] ibuprofen 800 mg tablet Take 1 tablet (800 mg) by mouth 3 times a day as needed.      [DISCONTINUED] montelukast (Singulair) 10 mg tablet Take 1 tablet (10 mg) by mouth once daily.      [DISCONTINUED] oxyCODONE-acetaminophen (Percocet) 5-325 mg tablet Take 1 tablet by mouth every 6 hours.      [DISCONTINUED] pantoprazole (ProtoNix) 40 mg EC tablet Take 1 tablet (40 mg) by mouth once daily.      [DISCONTINUED] predniSONE (Deltasone) 20 mg tablet TAKE 3 TABLETS BY MOUTH ONCE DAILY FOR 3 DAYS  THEN TAKE 2 TABLETS DAILY FOR 3 DAYS  THEN TAKE 1 TABLET DAILY FOR 3 DAYS  THEN TAKE 1/2 TABL       No current facility-administered medications on file prior to visit.     Vitals:    12/28/23 1034   BP: 123/74   Pulse: 63   Resp: 18   Temp: 36.9 °C (98.4 °F)   SpO2: 95%     Physical Exam:      Constitutional: Performance status 0. 123  kg.  -General: Well-developed and well-nourished. No acute distress.  Elevated BMI noted again.  -Lymphatics: No cervical or supraclavicular lymphadenopathy.  -Eyes:  Anicteric.  -HEENT: Poor dentition.  MMM.  -Cardiovascular: Regular rate and rhythm.    -Respiratory: Clear to auscultation bilaterally. Breathing is nonlabored.  -Abdomen: Soft, nontender, limited by body habitus.  -Back: No costovertebral angle tenderness. No spine tenderness.  -Extremities: 1+ bilateral leg edema, stable.  -Neurologic: Awake, alert, and oriented.  Speech fluent with normal content.   -Skin: Venous stasis changes with no evidence of superimposed cellulitis currently.  -Psychiatric: Appropriate, cooperative, and pleasant.       Assessment and Plan:   Assessment:    #History of thrombosis :   1.  2002 left calf DVT and pulmonary embolism after back surgery status post [place] filter for unclear reasons and warfarin for 3 months.    2. Unprovoked pulmonary embolism March 2023 (did have a URI for 1 week prior but was not significantly ill, was treated for pneumonia during hospitalization March  2023)-3/9/2023 CT chest  showed pulmonary emboli involving segmental and subsegmental pulmonary arteries supplying the left lower lobe, platelike airspace consolidation left lower lobe likely atelectasis, multiple areas of groundglass opacity could be atypical infection.  March 2023 negative antiphospholipid antibody syndrome evaluation, ultrasound no DVT, and LVEF normal-patient was treated with Eliquis with symptoms resolved .    May 2023 protein C, protein S, Antithrombin III and myeloid next-generation sequencing panel unremarkable.  May 2023 after his initial appointment with me I found  out he had a history of paroxysmal atrial fibrillation and received Dr. Donnelly's inpatient note from March 2023 - Noted difficulty regulating warfarin status post filter, felt to be unprovoked PE left  lower lobe on Lovenox, ordered antiphospholipid antibody evaluation, recommend long-term management with Eliquis to be held 2 days before any procedures and resume as soon as medically feasible.     Anticoagulation for his atrial fibrillation as well as his thrombosis seems in order. He does have chronic venous stasis changes bilaterally even though he never had a clot in the right leg, vascular studies 2021 with reflux on the left.  No signs or  symptoms of malignancy except March 2023 BRBPR x1 after he was hospitalized but he reported he had that in the past related to a hemorrhoid, subsequently has had intermittent symptoms, also has an elevated PSA although better as below with urology evaluation for hematuria November-December 2023 showing benign findings.  There has been a delay in his colonoscopy.    # 12/2023 left leg cellulitis with ultrasound no thrombosis, D-dimer elevated likely due to cellulitis, symptoms improved with antibiotics    # Hematuria as above, symptoms subsequently resolved, CT urogram unremarkable 12/2023     #Borderline thrombocytosis with platelet count in the 400 range with no other evidence of a  myeloproliferative disorder.  Negative next-generation sequencing panel May 2023.  Normal platelet count 12/23/2023.     #Elevated PSA 4.5 in April 2022, was 2.3 in April 2023 so improved, never had a prostate biopsy but followed by urology.  Family history of prostate cancer.  Cystoscopy 12/20/2023 as above.     #Atrial fibrillation as above     #7/2022 CT chest without contrast showed interval resolution of pneumonic consolidations with presence of diffuse mild scattered groundglass opacities could be bronchiolitis, nonspecific  pulmonary nodules, consider follow-up in 1 year, no adenopathy, small hiatal hernia. 3/9/2023 CT chest showed pulmonary emboli involving segmental and subsegmental pulmonary arteries supplying the left lower lobe, platelike airspace consolidation left  lower lobe likely atelectasis, multiple areas of groundglass opacity could be atypical infection.    8/21/2023 CT chest showed no pulmonary embolism, slightly prominent mediastinal lymph nodes, groundglass appearance of the lungs similar.     #Hypertension     #Colon polyps 2018 per patient with plans for follow-up colonoscopy in 5 years June 2023, delayed due to pulmonary issues and thrombosis     #Other chronic medical issues including dyslipidemia, glucose intolerance, depression/anxiety, peripheral vascular disease, VICENTE, chronic pain, post-COVID respiratory issues with cough and chronic  oxygen requirements     #Hyponatremia 8/2023 (134).  Sodium normal 12/23/2023.    # Mild anemia with hemoglobin 12.7 on 12/23/2023 in the setting of cellulitis.     -CBC, iron studies, B12, folate ordered with further evaluation depending on his hemoglobin.  -Regarding his atrial fibrillation I recommended that he establish with a cardiologist again.  - Consider PT/PTT, fibrinogen, factor V Leiden mutation, prothrombin gene mutation, MARCO, D-dimer depending on clinical situation; deferred as it would not change his management .  -HEPATITIS screening could  be considered.  -I again recommended ongoing follow-up with  urology regarding PSA and family history of prostate cancer.  -I again recommended a colonoscopy ASAP, has had 3 months of anticoagulation before interrupting-he reports he has an appointment for AAA consultation 1/24/2024.  I do not think he necessarily needs bridging anticoagulation, just temporarily holding anticoagulation.  -I again 12/28/2023 recommended consider follow-up with vascular surgery regarding his venous stasis changes.  -I again 12/28/2023 recommended follow-up with pulmonary for CT chest.  -General healthy lifestyle with reduction of immobility and prevention of blood clots was encouraged including prophylactic anticoagulation if ever hospitalized or has a surgery if no longer on  anticoagulation, no smoking, optimization of weight, getting up and moving around regularly if on a plane or in a car, no hormonal agents.  Compression stockings were recommended  again, but he is going to see a podiatrist first to get his toenail issue fixed which was bothered by the compression stockings. Concurrent other blood thinning medications were discouraged unless clinically needed.  I advised the patient to get a medical alert bracelet while on anticoagulation-12/28/2023 he showed me the necklace that he got, also has a keychain with this information on it.  -Given the unprovoked and second thrombotic event recommended indefinite anticoagulation as long as the benefit outweighs  the risk, especially in the setting of atrial fibrillation.  -I explained that I am available to make recommendations regarding anticoagulation but the prescription needs to come from PCP.  Eliquis information as outlined in my note from 5/15/2023 at which  time I explained that his weight is above the recommended levels for DOACs but he declined going on Coumadin, could consider checking a peak and trough antifactor Xa assay which is deferred as his thrombosis has gotten  better.  This needs to be discontinued at least 40 hours prior to elective surgery or invasive procedure with moderate to high risk of bleeding, at least 24 hours prior to elective surgery or invasive procedure with a low risk of bleeding, reinitiate when adequate  hemostasis achieved.  Caution with concurrent other blood thinning medications.  I advised that grapefruit juice can increase levels or effects of this medication and so should be avoided.  Periodically monitor CMP and CBC.  Of note, PT and PTT are  prolonged with this medication.  -I recommended he complete the antibiotics for his cellulitis and follow-up with PCP regarding this.  -Labs are incorporated in my note which is to be sent to PCP. I have recommended routine health care maintenance and screening through PCP. The patient was reminded to  followup with the PCP for other medical problems and ongoing care. The patient was asked to return to clinic, or sooner as needed for new or concerning symptoms, in  4 months.    12/28/2023 I explained my upcoming departure from [facility], explained that I was told that [facility] is working on my replacement, notified patient to make sure appointments/orders are scheduled and kept as directed.  I wished the patient the best of luck.

## 2024-01-02 ENCOUNTER — TELEPHONE (OUTPATIENT)
Dept: HEMATOLOGY/ONCOLOGY | Facility: HOSPITAL | Age: 64
End: 2024-01-02
Payer: COMMERCIAL

## 2024-01-02 DIAGNOSIS — E53.8 B12 DEFICIENCY: Primary | ICD-10-CM

## 2024-01-02 DIAGNOSIS — I87.2 VENOUS INSUFFICIENCY (CHRONIC) (PERIPHERAL): ICD-10-CM

## 2024-01-02 NOTE — PROGRESS NOTES
"Message sent to RN June: \"B12 borderline-have patient started on an oral supplement of 500-1000 mcg daily, can get over-the-counter.  Additional labs ordered-please have patient come for these.  Verify appointment in 4 months so his labs can be rechecked.\"  MMA, parietal cell antibody, intrinsic factor antibody ordered.  "

## 2024-01-02 NOTE — TELEPHONE ENCOUNTER
B12 borderline-have patient started on an oral supplement of 500-1000 mcg daily, can get over-the-counter.  Additional labs ordered-please have patient come for these.  Verify appointment in 4 months so his labs can be rechecked. Per Dr. Snyder staff message.      Contacted patient, instructed to start an OTC Vitamin b12 supplement of 500-1000 mcg daily due to a borderline vitamin b12 level.

## 2024-01-04 ENCOUNTER — APPOINTMENT (OUTPATIENT)
Dept: RADIOLOGY | Facility: HOSPITAL | Age: 64
DRG: 603 | End: 2024-01-04
Payer: COMMERCIAL

## 2024-01-04 ENCOUNTER — HOSPITAL ENCOUNTER (INPATIENT)
Facility: HOSPITAL | Age: 64
LOS: 3 days | Discharge: HOME | DRG: 603 | End: 2024-01-07
Attending: EMERGENCY MEDICINE | Admitting: NURSE PRACTITIONER
Payer: COMMERCIAL

## 2024-01-04 DIAGNOSIS — L03.818 CELLULITIS OF OTHER SPECIFIED SITE: ICD-10-CM

## 2024-01-04 DIAGNOSIS — K59.03 CONSTIPATION DUE TO OPIOID THERAPY: ICD-10-CM

## 2024-01-04 DIAGNOSIS — L03.90 CELLULITIS, UNSPECIFIED CELLULITIS SITE: ICD-10-CM

## 2024-01-04 DIAGNOSIS — T40.2X5A CONSTIPATION DUE TO OPIOID THERAPY: ICD-10-CM

## 2024-01-04 DIAGNOSIS — I87.2 VENOUS INSUFFICIENCY (CHRONIC) (PERIPHERAL): ICD-10-CM

## 2024-01-04 DIAGNOSIS — L03.116 CELLULITIS OF LEFT LOWER EXTREMITY: ICD-10-CM

## 2024-01-04 DIAGNOSIS — Z78.9 FAILURE OF OUTPATIENT TREATMENT: Primary | ICD-10-CM

## 2024-01-04 LAB
ALBUMIN SERPL BCP-MCNC: 3.9 G/DL (ref 3.4–5)
ALP SERPL-CCNC: 69 U/L (ref 33–136)
ALT SERPL W P-5'-P-CCNC: 19 U/L (ref 10–52)
ANION GAP SERPL CALC-SCNC: 13 MMOL/L (ref 10–20)
AST SERPL W P-5'-P-CCNC: 37 U/L (ref 9–39)
BASOPHILS # BLD AUTO: 0.1 X10*3/UL (ref 0–0.1)
BASOPHILS NFR BLD AUTO: 1.1 %
BILIRUB SERPL-MCNC: 0.3 MG/DL (ref 0–1.2)
BUN SERPL-MCNC: 19 MG/DL (ref 6–23)
CALCIUM SERPL-MCNC: 9.2 MG/DL (ref 8.6–10.3)
CHLORIDE SERPL-SCNC: 101 MMOL/L (ref 98–107)
CO2 SERPL-SCNC: 23 MMOL/L (ref 21–32)
CREAT SERPL-MCNC: 0.93 MG/DL (ref 0.5–1.3)
EOSINOPHIL # BLD AUTO: 0.76 X10*3/UL (ref 0–0.7)
EOSINOPHIL NFR BLD AUTO: 8.3 %
ERYTHROCYTE [DISTWIDTH] IN BLOOD BY AUTOMATED COUNT: 13.8 % (ref 11.5–14.5)
GFR SERPL CREATININE-BSD FRML MDRD: >90 ML/MIN/1.73M*2
GLUCOSE SERPL-MCNC: 86 MG/DL (ref 74–99)
HCT VFR BLD AUTO: 42.4 % (ref 41–52)
HGB BLD-MCNC: 13.7 G/DL (ref 13.5–17.5)
IMM GRANULOCYTES # BLD AUTO: 0.06 X10*3/UL (ref 0–0.7)
IMM GRANULOCYTES NFR BLD AUTO: 0.7 % (ref 0–0.9)
LACTATE SERPL-SCNC: 1.2 MMOL/L (ref 0.4–2)
LYMPHOCYTES # BLD AUTO: 2.11 X10*3/UL (ref 1.2–4.8)
LYMPHOCYTES NFR BLD AUTO: 23 %
MCH RBC QN AUTO: 28.7 PG (ref 26–34)
MCHC RBC AUTO-ENTMCNC: 32.3 G/DL (ref 32–36)
MCV RBC AUTO: 89 FL (ref 80–100)
MONOCYTES # BLD AUTO: 1.04 X10*3/UL (ref 0.1–1)
MONOCYTES NFR BLD AUTO: 11.3 %
NEUTROPHILS # BLD AUTO: 5.12 X10*3/UL (ref 1.2–7.7)
NEUTROPHILS NFR BLD AUTO: 55.6 %
NRBC BLD-RTO: 0 /100 WBCS (ref 0–0)
PLATELET # BLD AUTO: 385 X10*3/UL (ref 150–450)
POTASSIUM SERPL-SCNC: 4.4 MMOL/L (ref 3.5–5.3)
PROT SERPL-MCNC: 7.8 G/DL (ref 6.4–8.2)
RBC # BLD AUTO: 4.78 X10*6/UL (ref 4.5–5.9)
SODIUM SERPL-SCNC: 133 MMOL/L (ref 136–145)
WBC # BLD AUTO: 9.2 X10*3/UL (ref 4.4–11.3)

## 2024-01-04 PROCEDURE — 2500000004 HC RX 250 GENERAL PHARMACY W/ HCPCS (ALT 636 FOR OP/ED): Mod: IPSPLIT | Performed by: NURSE PRACTITIONER

## 2024-01-04 PROCEDURE — 2500000004 HC RX 250 GENERAL PHARMACY W/ HCPCS (ALT 636 FOR OP/ED): Mod: IPSPLIT

## 2024-01-04 PROCEDURE — 84075 ASSAY ALKALINE PHOSPHATASE: CPT | Performed by: EMERGENCY MEDICINE

## 2024-01-04 PROCEDURE — 87040 BLOOD CULTURE FOR BACTERIA: CPT | Mod: GENLAB | Performed by: EMERGENCY MEDICINE

## 2024-01-04 PROCEDURE — 2500000004 HC RX 250 GENERAL PHARMACY W/ HCPCS (ALT 636 FOR OP/ED): Performed by: EMERGENCY MEDICINE

## 2024-01-04 PROCEDURE — 1100000001 HC PRIVATE ROOM DAILY: Mod: IPSPLIT

## 2024-01-04 PROCEDURE — 99285 EMERGENCY DEPT VISIT HI MDM: CPT | Performed by: EMERGENCY MEDICINE

## 2024-01-04 PROCEDURE — 2500000001 HC RX 250 WO HCPCS SELF ADMINISTERED DRUGS (ALT 637 FOR MEDICARE OP): Mod: IPSPLIT | Performed by: NURSE PRACTITIONER

## 2024-01-04 PROCEDURE — 36415 COLL VENOUS BLD VENIPUNCTURE: CPT | Performed by: EMERGENCY MEDICINE

## 2024-01-04 PROCEDURE — 85025 COMPLETE CBC W/AUTO DIFF WBC: CPT | Performed by: EMERGENCY MEDICINE

## 2024-01-04 PROCEDURE — 93971 EXTREMITY STUDY: CPT

## 2024-01-04 PROCEDURE — 83605 ASSAY OF LACTIC ACID: CPT | Performed by: EMERGENCY MEDICINE

## 2024-01-04 RX ORDER — SERTRALINE HYDROCHLORIDE 50 MG/1
100 TABLET, FILM COATED ORAL DAILY
Status: DISCONTINUED | OUTPATIENT
Start: 2024-01-04 | End: 2024-01-07 | Stop reason: HOSPADM

## 2024-01-04 RX ORDER — VANCOMYCIN HYDROCHLORIDE 1 G/200ML
1 INJECTION, SOLUTION INTRAVENOUS
Status: ACTIVE | OUTPATIENT
Start: 2024-01-04 | End: 2024-01-04

## 2024-01-04 RX ORDER — POLYETHYLENE GLYCOL 3350 17 G/17G
17 POWDER, FOR SOLUTION ORAL DAILY PRN
Status: DISCONTINUED | OUTPATIENT
Start: 2024-01-04 | End: 2024-01-07 | Stop reason: HOSPADM

## 2024-01-04 RX ORDER — GABAPENTIN 300 MG/1
600 CAPSULE ORAL 3 TIMES DAILY
Status: DISCONTINUED | OUTPATIENT
Start: 2024-01-04 | End: 2024-01-07 | Stop reason: HOSPADM

## 2024-01-04 RX ORDER — LISINOPRIL 10 MG/1
10 TABLET ORAL DAILY
Status: DISCONTINUED | OUTPATIENT
Start: 2024-01-04 | End: 2024-01-07 | Stop reason: HOSPADM

## 2024-01-04 RX ORDER — ACETAMINOPHEN 650 MG/1
650 SUPPOSITORY RECTAL EVERY 4 HOURS PRN
Status: DISCONTINUED | OUTPATIENT
Start: 2024-01-04 | End: 2024-01-06

## 2024-01-04 RX ORDER — OXYCODONE AND ACETAMINOPHEN 5; 325 MG/1; MG/1
2 TABLET ORAL ONCE
Status: COMPLETED | OUTPATIENT
Start: 2024-01-04 | End: 2024-01-04

## 2024-01-04 RX ORDER — ENOXAPARIN SODIUM 100 MG/ML
40 INJECTION SUBCUTANEOUS EVERY 24 HOURS
Status: DISCONTINUED | OUTPATIENT
Start: 2024-01-04 | End: 2024-01-06

## 2024-01-04 RX ORDER — ACETAMINOPHEN 160 MG/5ML
650 SOLUTION ORAL EVERY 4 HOURS PRN
Status: DISCONTINUED | OUTPATIENT
Start: 2024-01-04 | End: 2024-01-06

## 2024-01-04 RX ORDER — SODIUM CHLORIDE 9 MG/ML
INJECTION, SOLUTION INTRAVENOUS
Status: COMPLETED
Start: 2024-01-04 | End: 2024-01-04

## 2024-01-04 RX ORDER — PANTOPRAZOLE SODIUM 40 MG/10ML
40 INJECTION, POWDER, LYOPHILIZED, FOR SOLUTION INTRAVENOUS
Status: DISCONTINUED | OUTPATIENT
Start: 2024-01-05 | End: 2024-01-07 | Stop reason: HOSPADM

## 2024-01-04 RX ORDER — ACETAMINOPHEN 325 MG/1
650 TABLET ORAL EVERY 4 HOURS PRN
Status: DISCONTINUED | OUTPATIENT
Start: 2024-01-04 | End: 2024-01-06

## 2024-01-04 RX ORDER — VANCOMYCIN 1.75 G/350ML
1250 INJECTION, SOLUTION INTRAVENOUS EVERY 12 HOURS
Status: DISCONTINUED | OUTPATIENT
Start: 2024-01-04 | End: 2024-01-07 | Stop reason: HOSPADM

## 2024-01-04 RX ORDER — ONDANSETRON HYDROCHLORIDE 2 MG/ML
4 INJECTION, SOLUTION INTRAVENOUS EVERY 8 HOURS PRN
Status: DISCONTINUED | OUTPATIENT
Start: 2024-01-04 | End: 2024-01-07 | Stop reason: HOSPADM

## 2024-01-04 RX ORDER — ONDANSETRON 4 MG/1
4 TABLET, FILM COATED ORAL EVERY 8 HOURS PRN
Status: DISCONTINUED | OUTPATIENT
Start: 2024-01-04 | End: 2024-01-07 | Stop reason: HOSPADM

## 2024-01-04 RX ORDER — OXYCODONE HYDROCHLORIDE 5 MG/1
5 TABLET ORAL EVERY 4 HOURS PRN
Status: DISCONTINUED | OUTPATIENT
Start: 2024-01-04 | End: 2024-01-06

## 2024-01-04 RX ORDER — HYDROMORPHONE HYDROCHLORIDE 1 MG/ML
1 INJECTION, SOLUTION INTRAMUSCULAR; INTRAVENOUS; SUBCUTANEOUS EVERY 2 HOUR PRN
Status: DISCONTINUED | OUTPATIENT
Start: 2024-01-04 | End: 2024-01-05

## 2024-01-04 RX ORDER — PANTOPRAZOLE SODIUM 40 MG/1
40 TABLET, DELAYED RELEASE ORAL
Status: DISCONTINUED | OUTPATIENT
Start: 2024-01-05 | End: 2024-01-07 | Stop reason: HOSPADM

## 2024-01-04 RX ORDER — METOPROLOL TARTRATE 25 MG/1
25 TABLET, FILM COATED ORAL
Status: DISCONTINUED | OUTPATIENT
Start: 2024-01-04 | End: 2024-01-07 | Stop reason: HOSPADM

## 2024-01-04 RX ADMIN — PIPERACILLIN SODIUM AND TAZOBACTAM SODIUM 4.5 G: 4; .5 INJECTION, SOLUTION INTRAVENOUS at 19:41

## 2024-01-04 RX ADMIN — GABAPENTIN 600 MG: 300 CAPSULE ORAL at 20:32

## 2024-01-04 RX ADMIN — METOPROLOL TARTRATE 25 MG: 25 TABLET, FILM COATED ORAL at 17:54

## 2024-01-04 RX ADMIN — SODIUM CHLORIDE: 0.9 INJECTION, SOLUTION INTRAVENOUS at 18:00

## 2024-01-04 RX ADMIN — VANCOMYCIN 1250 MG: 1.75 INJECTION, SOLUTION INTRAVENOUS at 17:49

## 2024-01-04 RX ADMIN — ACETAMINOPHEN 650 MG: 325 TABLET ORAL at 17:54

## 2024-01-04 RX ADMIN — PIPERACILLIN SODIUM AND TAZOBACTAM SODIUM 4.5 G: 4; .5 INJECTION, SOLUTION INTRAVENOUS at 12:33

## 2024-01-04 RX ADMIN — OXYCODONE HYDROCHLORIDE AND ACETAMINOPHEN 2 TABLET: 5; 325 TABLET ORAL at 20:32

## 2024-01-04 RX ADMIN — APIXABAN 5 MG: 5 TABLET, FILM COATED ORAL at 20:33

## 2024-01-04 SDOH — SOCIAL STABILITY: SOCIAL INSECURITY: ABUSE: ADULT

## 2024-01-04 SDOH — SOCIAL STABILITY: SOCIAL INSECURITY: DO YOU FEEL UNSAFE GOING BACK TO THE PLACE WHERE YOU ARE LIVING?: NO

## 2024-01-04 SDOH — SOCIAL STABILITY: SOCIAL INSECURITY: DOES ANYONE TRY TO KEEP YOU FROM HAVING/CONTACTING OTHER FRIENDS OR DOING THINGS OUTSIDE YOUR HOME?: NO

## 2024-01-04 SDOH — SOCIAL STABILITY: SOCIAL INSECURITY: ARE THERE ANY APPARENT SIGNS OF INJURIES/BEHAVIORS THAT COULD BE RELATED TO ABUSE/NEGLECT?: NO

## 2024-01-04 SDOH — SOCIAL STABILITY: SOCIAL INSECURITY: HAVE YOU HAD THOUGHTS OF HARMING ANYONE ELSE?: NO

## 2024-01-04 SDOH — SOCIAL STABILITY: SOCIAL INSECURITY: DO YOU FEEL ANYONE HAS EXPLOITED OR TAKEN ADVANTAGE OF YOU FINANCIALLY OR OF YOUR PERSONAL PROPERTY?: NO

## 2024-01-04 SDOH — SOCIAL STABILITY: SOCIAL INSECURITY: ARE YOU OR HAVE YOU BEEN THREATENED OR ABUSED PHYSICALLY, EMOTIONALLY, OR SEXUALLY BY ANYONE?: NO

## 2024-01-04 SDOH — SOCIAL STABILITY: SOCIAL INSECURITY: HAS ANYONE EVER THREATENED TO HURT YOUR FAMILY OR YOUR PETS?: NO

## 2024-01-04 ASSESSMENT — ACTIVITIES OF DAILY LIVING (ADL)
GROOMING: INDEPENDENT
HEARING - LEFT EAR: FUNCTIONAL
DRESSING YOURSELF: INDEPENDENT
PATIENT'S MEMORY ADEQUATE TO SAFELY COMPLETE DAILY ACTIVITIES?: YES
BATHING: INDEPENDENT
ADEQUATE_TO_COMPLETE_ADL: YES
HEARING - RIGHT EAR: FUNCTIONAL
TOILETING: INDEPENDENT
FEEDING YOURSELF: INDEPENDENT
JUDGMENT_ADEQUATE_SAFELY_COMPLETE_DAILY_ACTIVITIES: YES
WALKS IN HOME: INDEPENDENT
LACK_OF_TRANSPORTATION: NO

## 2024-01-04 ASSESSMENT — COGNITIVE AND FUNCTIONAL STATUS - GENERAL
DAILY ACTIVITIY SCORE: 24
DAILY ACTIVITIY SCORE: 24
PATIENT BASELINE BEDBOUND: NO
CLIMB 3 TO 5 STEPS WITH RAILING: A LITTLE
MOBILITY SCORE: 23
MOBILITY SCORE: 24

## 2024-01-04 ASSESSMENT — PAIN DESCRIPTION - ORIENTATION: ORIENTATION: LEFT

## 2024-01-04 ASSESSMENT — PATIENT HEALTH QUESTIONNAIRE - PHQ9
SUM OF ALL RESPONSES TO PHQ9 QUESTIONS 1 & 2: 0
1. LITTLE INTEREST OR PLEASURE IN DOING THINGS: NOT AT ALL
2. FEELING DOWN, DEPRESSED OR HOPELESS: NOT AT ALL

## 2024-01-04 ASSESSMENT — PAIN DESCRIPTION - LOCATION
LOCATION: FOOT
LOCATION: FOOT
LOCATION: ANKLE

## 2024-01-04 ASSESSMENT — LIFESTYLE VARIABLES
AUDIT-C TOTAL SCORE: 1
HOW OFTEN DO YOU HAVE A DRINK CONTAINING ALCOHOL: MONTHLY OR LESS
HOW MANY STANDARD DRINKS CONTAINING ALCOHOL DO YOU HAVE ON A TYPICAL DAY: 1 OR 2
HOW OFTEN DO YOU HAVE 6 OR MORE DRINKS ON ONE OCCASION: NEVER
AUDIT-C TOTAL SCORE: 1
SKIP TO QUESTIONS 9-10: 1

## 2024-01-04 ASSESSMENT — PAIN - FUNCTIONAL ASSESSMENT
PAIN_FUNCTIONAL_ASSESSMENT: 0-10

## 2024-01-04 ASSESSMENT — PAIN DESCRIPTION - PAIN TYPE: TYPE: ACUTE PAIN

## 2024-01-04 ASSESSMENT — PAIN SCALES - GENERAL
PAINLEVEL_OUTOF10: 6
PAINLEVEL_OUTOF10: 8

## 2024-01-04 NOTE — PROGRESS NOTES
"Vancomycin Dosing by Pharmacy- INITIAL    Rome Mullins is a 63 y.o. year old male who Pharmacy has been consulted for vancomycin dosing for cellulitis, skin and soft tissue. Based on the patient's indication and renal status this patient will be dosed based on a goal AUC of 400-600.     Renal function is currently stable.    Visit Vitals  /79 (BP Location: Right arm, Patient Position: Sitting)   Pulse 58   Temp 37.2 °C (99 °F) (Temporal)   Resp 18        Lab Results   Component Value Date    CREATININE 0.93 01/04/2024    CREATININE 0.84 12/23/2023    CREATININE 0.83 08/19/2023    CREATININE 0.80 04/20/2023    CREATININE 0.8 03/11/2023    CREATININE 0.8 03/10/2023        Patient weight is No results found for: \"PTWEIGHT\"    No results found for: \"CULTURE\"     No intake/output data recorded.  [unfilled]    Lab Results   Component Value Date    PATIENTTEMP 37.0 11/19/2021    PATIENTTEMP 37.0 11/18/2021    PATIENTTEMP 37.0 11/17/2021          Assessment/Plan     Patient will not be given a loading dose.  Will initiate vancomycin maintenance,  1250 mg every 12 hours.    This dosing regimen is predicted by [employer] to result in the following pharmacokinetic parameters:  Regimen: 1250 mg IV every 12 hours.  Start time: 15:07 on 01/04/2024  Exposure target: AUC24 (range)400-600 mg/L.hr   AUC24,ss: 464 mg/L.hr  Probability of AUC24 > 400: 66 %  Ctrough,ss: 15 mg/L  Probability of Ctrough,ss > 20: 24 %  Probability of nephrotoxicity (Lodise ROSHNI 2009): 10 %      Follow-up level will be ordered on 1/5/24 at 1000am unless clinically indicated sooner.  Will continue to monitor renal function daily while on vancomycin and order serum creatinine at least every 48 hours if not already ordered.  Follow for continued vancomycin needs, clinical response, and signs/symptoms of toxicity.       Mary Dwyer, PharmD       "

## 2024-01-04 NOTE — ED PROVIDER NOTES
HPI   Chief Complaint   Patient presents with    Leg Pain     Patient was treated last month for cellulitis of the left foot and he noticed today that it was swollen and painful in the left foot.        HPI                    No data recorded                Patient History   No past medical history on file.  No past surgical history on file.  Family History   Problem Relation Name Age of Onset    Mental illness Mother      Hypertension Mother      Diabetes Mother      Peripheral vascular disease Father      Diabetes Father      Hypertension Father      No Known Problems Sister      No Known Problems Daughter      No Known Problems Son      Diabetes Other fam hx     Heart disease Other fam hx     Stroke Other fam hx     Hypertension Other fam hx     Cancer Other fam hx      Social History     Tobacco Use    Smoking status: Not on file    Smokeless tobacco: Not on file   Substance Use Topics    Alcohol use: Not on file    Drug use: Not on file       Physical Exam   ED Triage Vitals [01/04/24 1153]   Temp Heart Rate Resp BP   36.9 °C (98.4 °F) 61 16 136/73      SpO2 Temp Source Heart Rate Source Patient Position   93 % Oral Monitor Sitting      BP Location FiO2 (%)     Right arm --       Physical Exam  Constitutional:       General: He is not in acute distress.     Appearance: Normal appearance. He is not toxic-appearing.   HENT:      Head: Normocephalic and atraumatic.      Right Ear: Tympanic membrane normal.      Left Ear: Tympanic membrane normal.      Mouth/Throat:      Mouth: Mucous membranes are moist.      Pharynx: Oropharynx is clear.   Eyes:      Conjunctiva/sclera: Conjunctivae normal.      Pupils: Pupils are equal, round, and reactive to light.   Cardiovascular:      Rate and Rhythm: Normal rate and regular rhythm.      Pulses: Normal pulses.      Heart sounds: Normal heart sounds.   Pulmonary:      Effort: Pulmonary effort is normal. No respiratory distress.      Breath sounds: Normal breath sounds. No  wheezing.   Abdominal:      General: Bowel sounds are normal.      Palpations: Abdomen is soft.      Tenderness: There is no abdominal tenderness. There is no guarding or rebound.   Musculoskeletal:         General: Normal range of motion.      Cervical back: Normal range of motion.        Feet:    Feet:      Left foot:      Skin integrity: Erythema and warmth present.   Skin:     General: Skin is warm and dry.      Findings: Erythema present.   Neurological:      General: No focal deficit present.      Mental Status: He is alert and oriented to person, place, and time.         ED Course & MDM   Diagnoses as of 01/04/24 5135   Failure of outpatient treatment   Cellulitis of left lower extremity       Medical Decision Making  63-year-old gentleman very pleasant presents to the ER with chief complaint of left leg pain and swelling.  According to the patient he reports that he had a little redness around his ankle and been taking antibiotics both Keflex and Bactrim however got progressively worse to the point that came to the ED for evaluation.  Due to the patient failure of outpatient he will be admitted to the hospital for IV antibiotics.  Did perform a ultrasound which came back negative.  Patient is not diabetic and he reports that he was compliant with his medication but still was not improving.  Started the patient on IV antibiotics.        Procedure  Procedures     Hamzah Bay, DO  01/04/24 0410

## 2024-01-04 NOTE — CARE PLAN
The patient's goals for the shift include      The clinical goals for the shift include Control pain  Patient compliant with  medications this shift

## 2024-01-04 NOTE — LETTER
January 7, 2024     Patient: Rome Mullins   YOB: 1960   Date of Visit: 1/4/2024       To Whom It May Concern:    Rome Mullins was admitted to [facility] on 1/4/24 and discharged home on 1/7/24. He may return to work on 1/8/24 with no restrictions at that time.          Sincerely,     Reviewed and approved by CARLEY ALEXANDER on 1/7/24 at 12:38 PM.          CC: No Recipients

## 2024-01-05 ENCOUNTER — APPOINTMENT (OUTPATIENT)
Dept: RADIOLOGY | Facility: HOSPITAL | Age: 64
DRG: 603 | End: 2024-01-05
Payer: COMMERCIAL

## 2024-01-05 LAB
ANION GAP SERPL CALC-SCNC: 14 MMOL/L (ref 10–20)
APPEARANCE UR: CLEAR
BILIRUB UR STRIP.AUTO-MCNC: NEGATIVE MG/DL
BUN SERPL-MCNC: 14 MG/DL (ref 6–23)
CALCIUM SERPL-MCNC: 8.7 MG/DL (ref 8.6–10.3)
CHLORIDE SERPL-SCNC: 100 MMOL/L (ref 98–107)
CO2 SERPL-SCNC: 23 MMOL/L (ref 21–32)
COLOR UR: YELLOW
CREAT SERPL-MCNC: 0.97 MG/DL (ref 0.5–1.3)
D DIMER PPP FEU-MCNC: 864 NG/ML FEU
ERYTHROCYTE [DISTWIDTH] IN BLOOD BY AUTOMATED COUNT: 14 % (ref 11.5–14.5)
GFR SERPL CREATININE-BSD FRML MDRD: 89 ML/MIN/1.73M*2
GLUCOSE SERPL-MCNC: 98 MG/DL (ref 74–99)
GLUCOSE UR STRIP.AUTO-MCNC: NEGATIVE MG/DL
HCT VFR BLD AUTO: 42.3 % (ref 41–52)
HGB BLD-MCNC: 13.2 G/DL (ref 13.5–17.5)
KETONES UR STRIP.AUTO-MCNC: NEGATIVE MG/DL
LEUKOCYTE ESTERASE UR QL STRIP.AUTO: ABNORMAL
MCH RBC QN AUTO: 28.6 PG (ref 26–34)
MCHC RBC AUTO-ENTMCNC: 31.2 G/DL (ref 32–36)
MCV RBC AUTO: 92 FL (ref 80–100)
MUCOUS THREADS #/AREA URNS AUTO: NORMAL /LPF
NITRITE UR QL STRIP.AUTO: NEGATIVE
NRBC BLD-RTO: 0 /100 WBCS (ref 0–0)
PH UR STRIP.AUTO: 5 [PH]
PLATELET # BLD AUTO: 337 X10*3/UL (ref 150–450)
POTASSIUM SERPL-SCNC: 4.3 MMOL/L (ref 3.5–5.3)
PROT UR STRIP.AUTO-MCNC: NEGATIVE MG/DL
RBC # BLD AUTO: 4.61 X10*6/UL (ref 4.5–5.9)
RBC # UR STRIP.AUTO: NEGATIVE /UL
RBC #/AREA URNS AUTO: NORMAL /HPF
SODIUM SERPL-SCNC: 133 MMOL/L (ref 136–145)
SP GR UR STRIP.AUTO: 1.03
UROBILINOGEN UR STRIP.AUTO-MCNC: <2 MG/DL
VANCOMYCIN SERPL-MCNC: 15 UG/ML (ref 5–20)
WBC # BLD AUTO: 7.5 X10*3/UL (ref 4.4–11.3)
WBC #/AREA URNS AUTO: NORMAL /HPF

## 2024-01-05 PROCEDURE — 81001 URINALYSIS AUTO W/SCOPE: CPT | Mod: IPSPLIT | Performed by: EMERGENCY MEDICINE

## 2024-01-05 PROCEDURE — 87086 URINE CULTURE/COLONY COUNT: CPT | Mod: GENLAB | Performed by: EMERGENCY MEDICINE

## 2024-01-05 PROCEDURE — 2500000004 HC RX 250 GENERAL PHARMACY W/ HCPCS (ALT 636 FOR OP/ED): Mod: IPSPLIT | Performed by: NURSE PRACTITIONER

## 2024-01-05 PROCEDURE — 93971 EXTREMITY STUDY: CPT | Performed by: RADIOLOGY

## 2024-01-05 PROCEDURE — 80048 BASIC METABOLIC PNL TOTAL CA: CPT | Mod: IPSPLIT | Performed by: NURSE PRACTITIONER

## 2024-01-05 PROCEDURE — 85379 FIBRIN DEGRADATION QUANT: CPT | Mod: IPSPLIT | Performed by: NURSE PRACTITIONER

## 2024-01-05 PROCEDURE — 93971 EXTREMITY STUDY: CPT | Mod: IPSPLIT

## 2024-01-05 PROCEDURE — 71275 CT ANGIOGRAPHY CHEST: CPT | Mod: RSC | Performed by: RADIOLOGY

## 2024-01-05 PROCEDURE — 71275 CT ANGIOGRAPHY CHEST: CPT | Mod: RSC,IPSPLIT

## 2024-01-05 PROCEDURE — 36415 COLL VENOUS BLD VENIPUNCTURE: CPT | Mod: IPSPLIT | Performed by: NURSE PRACTITIONER

## 2024-01-05 PROCEDURE — 85027 COMPLETE CBC AUTOMATED: CPT | Mod: IPSPLIT | Performed by: NURSE PRACTITIONER

## 2024-01-05 PROCEDURE — 93922 UPR/L XTREMITY ART 2 LEVELS: CPT | Mod: IPSPLIT

## 2024-01-05 PROCEDURE — 2500000001 HC RX 250 WO HCPCS SELF ADMINISTERED DRUGS (ALT 637 FOR MEDICARE OP): Mod: IPSPLIT | Performed by: NURSE PRACTITIONER

## 2024-01-05 PROCEDURE — 80202 ASSAY OF VANCOMYCIN: CPT | Mod: IPSPLIT | Performed by: NURSE PRACTITIONER

## 2024-01-05 PROCEDURE — 99223 1ST HOSP IP/OBS HIGH 75: CPT | Performed by: NURSE PRACTITIONER

## 2024-01-05 PROCEDURE — 2550000001 HC RX 255 CONTRASTS: Mod: IPSPLIT | Performed by: INTERNAL MEDICINE

## 2024-01-05 PROCEDURE — 1100000001 HC PRIVATE ROOM DAILY: Mod: IPSPLIT

## 2024-01-05 RX ADMIN — ACETAMINOPHEN 650 MG: 325 TABLET ORAL at 15:06

## 2024-01-05 RX ADMIN — PIPERACILLIN SODIUM AND TAZOBACTAM SODIUM 4.5 G: 4; .5 INJECTION, SOLUTION INTRAVENOUS at 00:06

## 2024-01-05 RX ADMIN — ENOXAPARIN SODIUM 40 MG: 40 INJECTION SUBCUTANEOUS at 15:03

## 2024-01-05 RX ADMIN — VANCOMYCIN 1250 MG: 1.75 INJECTION, SOLUTION INTRAVENOUS at 15:58

## 2024-01-05 RX ADMIN — APIXABAN 5 MG: 5 TABLET, FILM COATED ORAL at 21:22

## 2024-01-05 RX ADMIN — PANTOPRAZOLE SODIUM 40 MG: 40 TABLET, DELAYED RELEASE ORAL at 06:21

## 2024-01-05 RX ADMIN — GABAPENTIN 600 MG: 300 CAPSULE ORAL at 09:29

## 2024-01-05 RX ADMIN — METOPROLOL TARTRATE 25 MG: 25 TABLET, FILM COATED ORAL at 09:29

## 2024-01-05 RX ADMIN — HYDROMORPHONE HYDROCHLORIDE 1 MG: 1 INJECTION, SOLUTION INTRAMUSCULAR; INTRAVENOUS; SUBCUTANEOUS at 04:30

## 2024-01-05 RX ADMIN — PIPERACILLIN SODIUM AND TAZOBACTAM SODIUM 4.5 G: 4; .5 INJECTION, SOLUTION INTRAVENOUS at 05:48

## 2024-01-05 RX ADMIN — APIXABAN 5 MG: 5 TABLET, FILM COATED ORAL at 09:29

## 2024-01-05 RX ADMIN — METOPROLOL TARTRATE 25 MG: 25 TABLET, FILM COATED ORAL at 17:48

## 2024-01-05 RX ADMIN — HYDROMORPHONE HYDROCHLORIDE 1 MG: 1 INJECTION, SOLUTION INTRAMUSCULAR; INTRAVENOUS; SUBCUTANEOUS at 00:15

## 2024-01-05 RX ADMIN — GABAPENTIN 600 MG: 300 CAPSULE ORAL at 21:22

## 2024-01-05 RX ADMIN — SERTRALINE HYDROCHLORIDE 100 MG: 50 TABLET ORAL at 09:29

## 2024-01-05 RX ADMIN — LISINOPRIL 10 MG: 10 TABLET ORAL at 09:29

## 2024-01-05 RX ADMIN — VANCOMYCIN 1250 MG: 1.75 INJECTION, SOLUTION INTRAVENOUS at 04:24

## 2024-01-05 RX ADMIN — IOHEXOL 75 ML: 350 INJECTION, SOLUTION INTRAVENOUS at 13:07

## 2024-01-05 RX ADMIN — OXYCODONE HYDROCHLORIDE 5 MG: 5 TABLET ORAL at 21:31

## 2024-01-05 RX ADMIN — HYDROMORPHONE HYDROCHLORIDE 1 MG: 1 INJECTION, SOLUTION INTRAMUSCULAR; INTRAVENOUS; SUBCUTANEOUS at 09:34

## 2024-01-05 RX ADMIN — IOHEXOL 75 ML: 350 INJECTION, SOLUTION INTRAVENOUS at 12:14

## 2024-01-05 RX ADMIN — PIPERACILLIN SODIUM AND TAZOBACTAM SODIUM 4.5 G: 4; .5 INJECTION, SOLUTION INTRAVENOUS at 18:41

## 2024-01-05 RX ADMIN — PIPERACILLIN SODIUM AND TAZOBACTAM SODIUM 4.5 G: 4; .5 INJECTION, SOLUTION INTRAVENOUS at 12:01

## 2024-01-05 RX ADMIN — GABAPENTIN 600 MG: 300 CAPSULE ORAL at 15:03

## 2024-01-05 RX ADMIN — OXYCODONE HYDROCHLORIDE 5 MG: 5 TABLET ORAL at 15:06

## 2024-01-05 SDOH — SOCIAL STABILITY: SOCIAL INSECURITY
WITHIN THE LAST YEAR, HAVE YOU BEEN KICKED, HIT, SLAPPED, OR OTHERWISE PHYSICALLY HURT BY YOUR PARTNER OR EX-PARTNER?: NO

## 2024-01-05 SDOH — SOCIAL STABILITY: SOCIAL INSECURITY: WITHIN THE LAST YEAR, HAVE YOU BEEN AFRAID OF YOUR PARTNER OR EX-PARTNER?: NO

## 2024-01-05 SDOH — SOCIAL STABILITY: SOCIAL NETWORK: ARE YOU MARRIED, WIDOWED, DIVORCED, SEPARATED, NEVER MARRIED, OR LIVING WITH A PARTNER?: MARRIED

## 2024-01-05 SDOH — SOCIAL STABILITY: SOCIAL INSECURITY
WITHIN THE LAST YEAR, HAVE TO BEEN RAPED OR FORCED TO HAVE ANY KIND OF SEXUAL ACTIVITY BY YOUR PARTNER OR EX-PARTNER?: NO

## 2024-01-05 SDOH — ECONOMIC STABILITY: INCOME INSECURITY: IN THE PAST 12 MONTHS, HAS THE ELECTRIC, GAS, OIL, OR WATER COMPANY THREATENED TO SHUT OFF SERVICE IN YOUR HOME?: NO

## 2024-01-05 SDOH — SOCIAL STABILITY: SOCIAL INSECURITY: WITHIN THE LAST YEAR, HAVE YOU BEEN HUMILIATED OR EMOTIONALLY ABUSED IN OTHER WAYS BY YOUR PARTNER OR EX-PARTNER?: NO

## 2024-01-05 ASSESSMENT — COGNITIVE AND FUNCTIONAL STATUS - GENERAL
DAILY ACTIVITIY SCORE: 24
MOBILITY SCORE: 24

## 2024-01-05 ASSESSMENT — ENCOUNTER SYMPTOMS
COLOR CHANGE: 1
CARDIOVASCULAR NEGATIVE: 1
MUSCULOSKELETAL NEGATIVE: 1
PSYCHIATRIC NEGATIVE: 1
HEMATOLOGIC/LYMPHATIC NEGATIVE: 1
CONSTITUTIONAL NEGATIVE: 1
NEUROLOGICAL NEGATIVE: 1
RESPIRATORY NEGATIVE: 1
EYES NEGATIVE: 1
ALLERGIC/IMMUNOLOGIC NEGATIVE: 1
ENDOCRINE NEGATIVE: 1
GASTROINTESTINAL NEGATIVE: 1

## 2024-01-05 ASSESSMENT — PAIN SCALES - GENERAL
PAINLEVEL_OUTOF10: 8
PAINLEVEL_OUTOF10: 3
PAINLEVEL_OUTOF10: 7
PAINLEVEL_OUTOF10: 7
PAINLEVEL_OUTOF10: 3
PAINLEVEL_OUTOF10: 3
PAINLEVEL_OUTOF10: 8
PAINLEVEL_OUTOF10: 7
PAINLEVEL_OUTOF10: 4
PAINLEVEL_OUTOF10: 3

## 2024-01-05 ASSESSMENT — PAIN - FUNCTIONAL ASSESSMENT
PAIN_FUNCTIONAL_ASSESSMENT: 0-10

## 2024-01-05 ASSESSMENT — PAIN DESCRIPTION - LOCATION: LOCATION: FOOT

## 2024-01-05 NOTE — DISCHARGE INSTR - OTHER ORDERS
Thank you for choosing [facility] for your Health Care needs. As you transition from the hospital back to home, we hope we took your preferences into account on how you manage your health needs so you can manage your health at home.     You may receive a survey in the mail within the next couple weeks. Please take the time to complete it and return it. Your input is ALWAYS important to us. Thank you!  Your Care Transition Team - Mena Omer Amanda, Malka Dennis - [phone]

## 2024-01-05 NOTE — CONSULTS
Inpatient consult to Infectious Diseases  Consult performed by: Tony Barbour MD  Consult ordered by: Zoila Jackson, APRN-CNP            Primary MD: Victoria Aguirre, KURT-CNP    Reason For Consult  Left foot cellulitis    History Of Present Illness  Rome Mullins is a 63 y.o. male presenting with pain swelling and redness of the left foot.  Onset was a day prior to presentation, gradual, constant, interval worsening  He came to the hospital for further evaluation  He reports history of right foot cellulitis in the past.  He was started on empiric antibiotic therapy.  He reports interval improvement.  He is on IV vancomycin and Zosyn.  He reports interval improvement    Past Medical History  He has no past medical history on file.    Surgical History  He has no past surgical history on file.     Social History     Occupational History    Not on file   Tobacco Use    Smoking status: Never     Passive exposure: Never    Smokeless tobacco: Never   Substance and Sexual Activity    Alcohol use: Not Currently    Drug use: Not on file    Sexual activity: Not on file     Travel History   Travel since 12/05/23    No documented travel since 12/05/23                Family History  Family History   Problem Relation Name Age of Onset    Mental illness Mother      Hypertension Mother      Diabetes Mother      Peripheral vascular disease Father      Diabetes Father      Hypertension Father      No Known Problems Sister      No Known Problems Daughter      No Known Problems Son      Diabetes Other fam hx     Heart disease Other fam hx     Stroke Other fam hx     Hypertension Other fam hx     Cancer Other fam hx      Allergies  Fentanyl, Duloxetine, Venlafaxine, and Methadone     Immunization History   Administered Date(s) Administered    Flu vaccine (IIV4), preservative free *Check age/dose* 10/07/2020, 03/11/2023    Hep B Immune Globulin 02/18/2013, 03/18/2013    Influenza, Unspecified 10/01/2005    Influenza,  injectable, quadrivalent 2015, 10/11/2016, 10/19/2017, 2018, 10/18/2019    Influenza, seasonal, injectable 10/22/2009, 2011, 10/17/2012, 11/15/2013, 11/15/2014    Moderna SARS-CoV-2 Vaccination 2022, 05/15/2022    Pneumococcal polysaccharide vaccine, 23-valent, age 2 years and older (PNEUMOVAX 23) 2005, 2006    Td vaccine, age 7 years and older (TDVAX) 2003, 2005, 2005    Tdap vaccine, age 7 year and older (BOOSTRIX) 2014     Medications  Home medications:  Medications Prior to Admission   Medication Sig Dispense Refill Last Dose    acetaminophen (Tylenol) 500 mg tablet Take 2 tablets (1,000 mg) by mouth every 8 hours if needed.   Unknown    apixaban (Eliquis) 5 mg tablet Take 1 tablet (5 mg) by mouth 2 times a day. with or without food   2024    cephalexin (Keflex) 500 mg capsule Take 1 capsule (500 mg) by mouth twice a day.       [] cephalexin (Keflex) 500 mg capsule Take 1 capsule (500 mg) by mouth 4 times a day for 10 days. 40 capsule 0     gabapentin (Neurontin) 600 mg tablet Take 1 tablet (600 mg) by mouth 3 times a day.       lisinopril 10 mg tablet Take 1 tablet (10 mg) by mouth once daily.   2024    metoprolol tartrate (Lopressor) 25 mg tablet Take 1 tablet (25 mg) by mouth 2 times a day with meals.   2024    sertraline (Zoloft) 100 mg tablet Take 1 tablet (100 mg) by mouth once daily.   2024    sildenafil (Viagra) 100 mg tablet Take 1 tablet (100 mg) by mouth once daily as needed.   Unknown    sulfamethoxazole-trimethoprim (Bactrim DS) 800-160 mg tablet Sulfamethoxazole-Trimethoprim 800-160 MG Oral Tablet   Quantity: 20  Refills: 0        Start : 2021   Active       [] sulfamethoxazole-trimethoprim (Bactrim DS) 800-160 mg tablet Take 1 tablet by mouth every 12 hours for 10 days. 20 tablet 0      Current medications:  Scheduled medications  apixaban, 5 mg, oral, BID  enoxaparin, 40 mg, subcutaneous,  q24h  influenza, 0.5 mL, intramuscular, During hospitalization  gabapentin, 600 mg, oral, TID  lisinopril, 10 mg, oral, Daily  metoprolol tartrate, 25 mg, oral, BID with meals  pantoprazole, 40 mg, oral, Daily before breakfast   Or  pantoprazole, 40 mg, intravenous, Daily before breakfast  piperacillin-tazobactam, 4.5 g, intravenous, q6h  pneumococcal conjugate, 0.5 mL, intramuscular, During hospitalization  sertraline, 100 mg, oral, Daily  vancomycin-diluent combo no.1, 1,250 mg, intravenous, q12h      Continuous medications     PRN medications  PRN medications: acetaminophen **OR** acetaminophen **OR** acetaminophen, acetaminophen **OR** acetaminophen **OR** acetaminophen, ondansetron **OR** ondansetron, oxyCODONE, polyethylene glycol    Review of Systems   All other systems reviewed and are negative.       Objective  Range of Vitals (last 24 hours)  Heart Rate:  [54-62]   Temp:  [36.2 °C (97.2 °F)-37.2 °C (99 °F)]   Resp:  [18]   BP: (128-152)/(68-79)   SpO2:  [92 %-95 %]   Daily Weight  01/04/24 : 123 kg (271 lb)    Body mass index is 35.75 kg/m².     Physical Exam  Constitutional:       Appearance: Normal appearance.   HENT:      Nose: Nose normal.   Eyes:      Extraocular Movements: Extraocular movements intact.      Conjunctiva/sclera: Conjunctivae normal.   Cardiovascular:      Rate and Rhythm: Normal rate and regular rhythm.   Abdominal:      General: Bowel sounds are normal.      Palpations: Abdomen is soft.   Musculoskeletal:         General: Normal range of motion.      Cervical back: Normal range of motion and neck supple.   Skin:     Findings: Erythema present.      Comments: Mild bilateral foot erythema   Neurological:      Mental Status: He is alert and oriented to person, place, and time. Mental status is at baseline.   Psychiatric:         Mood and Affect: Affect normal.         Speech: Speech normal.          Relevant Results  Outside Hospital Results    Labs  Results from last 72 hours   Lab  "Units 01/05/24  0626 01/04/24  1227   WBC AUTO x10*3/uL 7.5 9.2   HEMOGLOBIN g/dL 13.2* 13.7   HEMATOCRIT % 42.3 42.4   PLATELETS AUTO x10*3/uL 337 385   NEUTROS PCT AUTO %  --  55.6   LYMPHS PCT AUTO %  --  23.0   MONOS PCT AUTO %  --  11.3   EOS PCT AUTO %  --  8.3     Results from last 72 hours   Lab Units 01/05/24  0626 01/04/24  1227   SODIUM mmol/L 133* 133*   POTASSIUM mmol/L 4.3 4.4   CHLORIDE mmol/L 100 101   CO2 mmol/L 23 23   BUN mg/dL 14 19   CREATININE mg/dL 0.97 0.93   GLUCOSE mg/dL 98 86   CALCIUM mg/dL 8.7 9.2   ANION GAP mmol/L 14 13   EGFR mL/min/1.73m*2 89 >90     Results from last 72 hours   Lab Units 01/04/24  1227   ALK PHOS U/L 69   BILIRUBIN TOTAL mg/dL 0.3   PROTEIN TOTAL g/dL 7.8   ALT U/L 19   AST U/L 37   ALBUMIN g/dL 3.9     Estimated Creatinine Clearance: 109.8 mL/min (by C-G formula based on SCr of 0.97 mg/dL).  CRP   Date Value Ref Range Status   11/21/2021 1.9 0 - 2.0 MG/DL Final     Comment:     Performed at [address] [address]   11/17/2021 8.9 (H) 0 - 2.0 MG/DL Final     Comment:     Performed at [address] [address]   11/11/2021 0.7 0 - 2.0 MG/DL Final     Comment:     Performed at [address] [address]     No results found for: \"HIV1X2\", \"HIVCONF\", \"UUIGZG9ZO\"  No results found for: \"HEPCABINIT\", \"HEPCAB\", \"HCVPCRQUANT\"  Microbiology  No results found for the last 90 days.  Reviewed  Imaging  Lower extremity venous duplex left    Result Date: 1/4/2024  STUDY: Left lower extremity venous ultrasound; Completed Time: 1/4/2024 12:24 INDICATION: LLE swelling. COMPARISON: US LE Venous 12/23/2023 ACCESSION NUMBER(S): [id number] ORDERING CLINICIAN: SANJUANITA HERRERA TECHNIQUE: Ultrasound of the left lower extremity veins.  Multiple images were obtained. FINDINGS: The left common femoral, femoral, and popliteal veins demonstrated normal compressibility, normal phasic venous flow, and normal response to augmentation.  " There is no evidence for echogenic thrombi.  The visualized deep calf veins are patent. The contralateral common femoral vein is free of thrombosis.    No evidence for DVT within the left lower extremity.  Signed by Paul Duncan MD    Vascular US lower extremity venous duplex bilateral    Result Date: 12/23/2023  Interpreted By:  Sathish Zelaya, STUDY: [facility] US LOWER EXTREMITY VENOUS DUPLEX BILATERAL;  12/23/2023 7:41 am   INDICATION: Signs/Symptoms:possible DVT with elevated D-dimer.   COMPARISON: None.   ACCESSION NUMBER(S): [id number]   ORDERING CLINICIAN: SULEMAN SAAVEDRA   TECHNIQUE: Vascular ultrasound of the bilateral lower extremities was performed. Real-time compression views as well as Gray scale, color Doppler and spectral Doppler waveform analysis was performed.   FINDINGS: Evaluation of the visualized portions of the bilateral common femoral vein, proximal, mid, and distal femoral vein, and popliteal vein were performed.  Evaluation of the visualized portions of the calf veins was also performed.   The evaluated veins demonstrate normal compressibility. There is intact venous flow demonstrating normal respiratory variability and normal augmentation of flow with calf compression. Therefore, there is no ultrasonographic evidence for deep vein thrombosis within the evaluated veins.       No sonographic evidence for deep vein thrombosis within the evaluated veins of the lower extremities bilaterally.   MACRO: None.   Signed by: Sathish Zelaya 12/23/2023 9:08 AM Dictation workstation:   [id number]    CT UROGRAM WO/W IVCON    Result Date: 12/14/2023  * * *Final Report* * * DATE OF EXAM: Dec 14 2023  1:52PM   MYC   0560  -  CT UROGRAM WO/W IVCON  / ACCESSION #  [id number] PROCEDURE REASON: Hematuria, unspecified type      * * * * Physician Interpretation * * * *  EXAMINATION:   CT ABDOMEN AND PELVIS WITHOUT AND WITH IV CONTRAST, INCLUDING EXCRETORY PHASE IMAGING (CT UROGRAM)  3D RECONSTRUCTIONS CLINICAL HISTORY:  Hematuria. TECHNIQUE: CT urogram protocol including unenhanced, renal parenchymal phase and excretory phase  renal imaging was obtained following IV contrast.  Normal saline was also administered IV.  No oral contrast was given.  3D image post-processing was performed and archived at the request of the referring physician, on the CT scanner workstation without   concurrent physician supervision. MQ:  CTU_2 Contrast: IV:  150 ml of Omnipaque 300 IV Saline:  150 ml of 0.9% NACL  Solution Oral Contrast: None CT Radiation dose: Integrated dose-length product (DLP) for this visit =   4067 mGy*cm. CT Dose Reduction Employed: Automated exposure control (AEC) COMPARISON: None. RESULT: Kidneys and urinary tract: Right: There are no renal calculi or masses.  The opacified calices, renal pelvis and ureter are normal without dilation, filling defect, or stricture. Left: There are no renal calculi or masses.  The opacified calices, renal pelvis and ureter are normal without dilation, filling defect, or stricture. Bladder: No filling defect, calculus, focal or diffuse wall thickening. Slightly limited imaging due to streak artifact. Abdomen and Pelvis: Liver: No mass. Biliary: No bile duct dilation.  Gallbladder is absent. Spleen: No mass. No splenomegaly. Pancreas: No mass or duct dilation. Adrenals: No mass.  GI tract: No dilation or wall thickening. Normal appendix Lymph nodes: No abdominal or pelvic lymphadenopathy. Mesentery/Peritoneum: No ascites or mass. Retroperitoneum: No mass. Vasculature: Atherosclerotic changes of the aorta and major branches. No aneurysm is identified. Pelvis: No mass, ascites or fluid collection. Bones and Soft Tissues: No significant finding. Lower thorax: Unremarkable.  (topogram) images: Unremarkable.    IMPRESSION: No renal calculi, obstructive changes, or evidence for renal or collecting system neoplasm. : LATRICE   Transcribe Date/Time: Dec 14 2023  2:42P Dictated by : ROOSEVELT  MD MERCY This examination was interpreted and the report reviewed and electronically signed by: ROOSEVELT MADRID MD on Dec 14 2023  2:50PM  EST     Assessment/Plan   Left foot cellulitis versus dermatitis    Continue vancomycin  Continue Zosyn  Monitor response to therapy  Supportive care  Monitor temperature and WBC      Tony Barbour MD

## 2024-01-05 NOTE — CARE PLAN
The patient's goals for the shift include      The clinical goals for the shift include Control pain    Patient was able to rest some of the evening, he was medicated for pain throughout the evening the current medication is providing some relief.

## 2024-01-05 NOTE — PROGRESS NOTES
"Vancomycin Dosing by Pharmacy- FOLLOW UP    Rome Mullins is a 63 y.o. year old male who Pharmacy has been consulted for vancomycin dosing for cellulitis, skin and soft tissue. Based on the patient's indication and renal status this patient is being dosed based on a goal AUC of 400-600.     Renal function is currently stable.    Current vancomycin dose: 1250 mg given every 12 hours    Estimated vancomycin AUC on current dose: 510 mg/L.hr     Visit Vitals  /74 (BP Location: Right leg, Patient Position: Lying)   Pulse 54   Temp 36.2 °C (97.2 °F) (Temporal)   Resp 18        Lab Results   Component Value Date    CREATININE 0.97 01/05/2024    CREATININE 0.93 01/04/2024    CREATININE 0.84 12/23/2023    CREATININE 0.83 08/19/2023    CREATININE 0.80 04/20/2023    CREATININE 0.8 03/11/2023    CREATININE 0.8 03/10/2023        Patient weight is No results found for: \"PTWEIGHT\"    No results found for: \"CULTURE\"     I/O last 3 completed shifts:  In: 800 (6.5 mL/kg) [IV Piggyback:800]  Out: - (0 mL/kg)   Weight: 122.9 kg   [unfilled]    Lab Results   Component Value Date    PATIENTTEMP 37.0 11/19/2021    PATIENTTEMP 37.0 11/18/2021    PATIENTTEMP 37.0 11/17/2021        Assessment/Plan    Within goal AUC range. Continue current vancomycin regimen.    This dosing regimen is predicted by [employer] to result in the following pharmacokinetic parameters:  Regimen: 1250 mg IV every 12 hours.  Start time: 16:24 on 01/05/2024  Exposure target: AUC24 (range)400-600 mg/L.hr   AUC24,ss: 510 mg/L.hr  Probability of AUC24 > 400: 85 %  Ctrough,ss: 16.3 mg/L  Probability of Ctrough,ss > 20: 26 %  Probability of nephrotoxicity (Lodise ROSHNI 2009): 12 %      The next level will be obtained on 1/8 at 0500. May be obtained sooner if clinically indicated.   Will continue to monitor renal function daily while on vancomycin and order serum creatinine at least every 48 hours if not already ordered.  Follow for continued vancomycin needs, " clinical response, and signs/symptoms of toxicity.       Mary Dwyer, PharmD

## 2024-01-05 NOTE — PROGRESS NOTES
Patient evaluated at bedside. AAOX3. Lives with his wife and adult son in 2 story split level home. There are no steps to enter the residence. Once inside he has two different sets of stairs with single railing to different areas of his home. He stated that it does take effort to perform the stairs due to the numbness in his feet. DME: walker available if needed from past surgery. Independent in ADL's and iADL's. His wife will assist him with his socks and shoes. He has had HHC in the past after joint replacements. Last HHC was in 2017, agency unknown. Patient does drive. Patient denies any falls within the past 6 months. PCP: Victoria Aguirre last seen March 2023. Pharmacy: [facility] in [place]. Patient self manages their home medications directly from the medication bottles without difficulty, and denies issues with affordability. Patient expressed interest in changing PCP. PCP provider list given to patient to consider. Plan for vascular study on bilateral lower extremities. No needs identified at this time. Oncoming TCC to continue to follow for any transition care needs or changes in current plan.     Anticipate home no needs when ready.

## 2024-01-05 NOTE — H&P
History Of Present Illness  Rome Mullins is a 63 y.o. male with a past medical history of fibromyalgia, PE/DVT (on eliquis), PVD, GERD, HLD, HTN, VICENTE, OA, Afib, anxiety/depression and multiple back surgeries including lumbar fusion presenting with cellulitis of left lower extremity.   ED: neg LLE US for DVT, WBC 9.2>7.5, bld and ua cx pending, CT chest (-) PE  Pt admitted for observation and management with IV abx   Past Medical History  He has no past medical history on file.    Surgical History  He has no past surgical history on file.     Social History  He reports that he has never smoked. He has never been exposed to tobacco smoke. He has never used smokeless tobacco. He reports that he does not currently use alcohol. No history on file for drug use.    Family History  Family History   Problem Relation Name Age of Onset    Mental illness Mother      Hypertension Mother      Diabetes Mother      Peripheral vascular disease Father      Diabetes Father      Hypertension Father      No Known Problems Sister      No Known Problems Daughter      No Known Problems Son      Diabetes Other fam hx     Heart disease Other fam hx     Stroke Other fam hx     Hypertension Other fam hx     Cancer Other fam hx         Allergies  Fentanyl, Duloxetine, Venlafaxine, and Methadone    Review of Systems   Constitutional: Negative.    HENT: Negative.     Eyes: Negative.    Respiratory: Negative.     Cardiovascular: Negative.    Gastrointestinal: Negative.    Endocrine: Negative.    Genitourinary: Negative.    Musculoskeletal: Negative.    Skin:  Positive for color change.        Erythema and swelling to LLE    Allergic/Immunologic: Negative.    Neurological: Negative.    Hematological: Negative.    Psychiatric/Behavioral: Negative.          Physical Exam  Constitutional:       Appearance: He is obese.   HENT:      Head: Normocephalic and atraumatic.      Mouth/Throat:      Mouth: Mucous membranes are moist.   Eyes:       Extraocular Movements: Extraocular movements intact.   Cardiovascular:      Rate and Rhythm: Normal rate and regular rhythm.      Heart sounds: Normal heart sounds.      Comments: Unable to palpate pedal and post tibial pulses   Pulmonary:      Effort: Pulmonary effort is normal.      Breath sounds: Normal breath sounds.   Abdominal:      General: Bowel sounds are normal.      Palpations: Abdomen is soft.   Musculoskeletal:         General: Normal range of motion.   Skin:     General: Skin is warm.      Findings: Erythema present.   Neurological:      General: No focal deficit present.      Mental Status: He is alert and oriented to person, place, and time.   Psychiatric:         Mood and Affect: Mood normal.         Behavior: Behavior normal.          Last Recorded Vitals  /74 (BP Location: Right leg, Patient Position: Lying)   Pulse 54   Temp 36.2 °C (97.2 °F) (Temporal)   Resp 18   Wt 123 kg (271 lb)   SpO2 92%     Relevant Results    Scheduled medications  apixaban, 5 mg, oral, BID  enoxaparin, 40 mg, subcutaneous, q24h  influenza, 0.5 mL, intramuscular, During hospitalization  gabapentin, 600 mg, oral, TID  lisinopril, 10 mg, oral, Daily  metoprolol tartrate, 25 mg, oral, BID with meals  pantoprazole, 40 mg, oral, Daily before breakfast   Or  pantoprazole, 40 mg, intravenous, Daily before breakfast  piperacillin-tazobactam, 4.5 g, intravenous, q6h  pneumococcal conjugate, 0.5 mL, intramuscular, During hospitalization  sertraline, 100 mg, oral, Daily  vancomycin-diluent combo no.1, 1,250 mg, intravenous, q12h      Continuous medications     PRN medications  PRN medications: acetaminophen **OR** acetaminophen **OR** acetaminophen, acetaminophen **OR** acetaminophen **OR** acetaminophen, ondansetron **OR** ondansetron, oxyCODONE, polyethylene glycol  Results for orders placed or performed during the hospital encounter of 01/04/24 (from the past 24 hour(s))   CBC   Result Value Ref Range    WBC 7.5 4.4 -  11.3 x10*3/uL    nRBC 0.0 0.0 - 0.0 /100 WBCs    RBC 4.61 4.50 - 5.90 x10*6/uL    Hemoglobin 13.2 (L) 13.5 - 17.5 g/dL    Hematocrit 42.3 41.0 - 52.0 %    MCV 92 80 - 100 fL    MCH 28.6 26.0 - 34.0 pg    MCHC 31.2 (L) 32.0 - 36.0 g/dL    RDW 14.0 11.5 - 14.5 %    Platelets 337 150 - 450 x10*3/uL   Basic metabolic panel   Result Value Ref Range    Glucose 98 74 - 99 mg/dL    Sodium 133 (L) 136 - 145 mmol/L    Potassium 4.3 3.5 - 5.3 mmol/L    Chloride 100 98 - 107 mmol/L    Bicarbonate 23 21 - 32 mmol/L    Anion Gap 14 10 - 20 mmol/L    Urea Nitrogen 14 6 - 23 mg/dL    Creatinine 0.97 0.50 - 1.30 mg/dL    eGFR 89 >60 mL/min/1.73m*2    Calcium 8.7 8.6 - 10.3 mg/dL   Vancomycin   Result Value Ref Range    Vancomycin 15.0 5.0 - 20.0 ug/mL   D-dimer, VTE Exclusion   Result Value Ref Range    D-Dimer, Quantitative VTE Exclusion 864 (H) <=500 ng/mL FEU   Urinalysis with Reflex Culture and Microscopic   Result Value Ref Range    Color, Urine Yellow Straw, Yellow    Appearance, Urine Clear Clear    Specific Gravity, Urine 1.028 1.005 - 1.035    pH, Urine 5.0 5.0, 5.5, 6.0, 6.5, 7.0, 7.5, 8.0    Protein, Urine NEGATIVE NEGATIVE mg/dL    Glucose, Urine NEGATIVE NEGATIVE mg/dL    Blood, Urine NEGATIVE NEGATIVE    Ketones, Urine NEGATIVE NEGATIVE mg/dL    Bilirubin, Urine NEGATIVE NEGATIVE    Urobilinogen, Urine <2.0 <2.0 mg/dL    Nitrite, Urine NEGATIVE NEGATIVE    Leukocyte Esterase, Urine TRACE (A) NEGATIVE   Microscopic Only, Urine   Result Value Ref Range    WBC, Urine 1-5 1-5, NONE /HPF    RBC, Urine 1-2 NONE, 1-2, 3-5 /HPF    Mucus, Urine FEW Reference range not established. /LPF          Assessment/Plan   Principal Problem:    Cellulitis  Active Problems:    Failure of outpatient treatment    #Left lower extremity cellulitis   - was on bactrim and keflex at home for cellulitis started 12/23/23  - raj LE US (-) for DVT/PE   - UA (-)  and blood cultures pending   - MARION studies ordered and pt has dusky feet raj with  decreased cap refill to toes raj   - Vanco/zosyn - ID consulted - appreciate recs    #Hx DVT/PE/Afib  - continue eliquis     #HTN/HLD/GERD   - continue lisinopril, metoprolol, protonix    #Fibromyalgia  #multiple back surgeries including lumbar fusion    - continue neurontin, tylenol     #Anxiety/Depression  - continue zoloft    DVT ppx: eliquis  PUD ppx: protonix     Total accumulated time spent face to face and not face to face preparing to see the patient, obtaining and reviewing separately obtained history; performing a medically appropriate examination and/or evaluation; counseling and educating the patient, family; ordering medications, tests, or procedures; referring and communicating with other health care professionals; documenting clinical information in the patient's medical record; independently interpreting results and communicating the results to the patient, family; and care coordination was 45 minutes    KURT Luna-CNP

## 2024-01-05 NOTE — CARE PLAN
Patient up ad linsey, steady gait. Medicated for pain as needed and ordered. Multiple test completed today, tolerates well. Tolerates iv antibiotics without side effects.

## 2024-01-06 LAB
ANION GAP SERPL CALC-SCNC: 13 MMOL/L (ref 10–20)
BUN SERPL-MCNC: 15 MG/DL (ref 6–23)
CALCIUM SERPL-MCNC: 8.7 MG/DL (ref 8.6–10.3)
CHLORIDE SERPL-SCNC: 101 MMOL/L (ref 98–107)
CO2 SERPL-SCNC: 25 MMOL/L (ref 21–32)
CREAT SERPL-MCNC: 0.91 MG/DL (ref 0.5–1.3)
ERYTHROCYTE [DISTWIDTH] IN BLOOD BY AUTOMATED COUNT: 13.6 % (ref 11.5–14.5)
GFR SERPL CREATININE-BSD FRML MDRD: >90 ML/MIN/1.73M*2
GLUCOSE SERPL-MCNC: 101 MG/DL (ref 74–99)
HCT VFR BLD AUTO: 40.3 % (ref 41–52)
HGB BLD-MCNC: 13.2 G/DL (ref 13.5–17.5)
MCH RBC QN AUTO: 29.4 PG (ref 26–34)
MCHC RBC AUTO-ENTMCNC: 32.8 G/DL (ref 32–36)
MCV RBC AUTO: 90 FL (ref 80–100)
NRBC BLD-RTO: 0 /100 WBCS (ref 0–0)
PLATELET # BLD AUTO: 330 X10*3/UL (ref 150–450)
POTASSIUM SERPL-SCNC: 4 MMOL/L (ref 3.5–5.3)
RBC # BLD AUTO: 4.49 X10*6/UL (ref 4.5–5.9)
SODIUM SERPL-SCNC: 135 MMOL/L (ref 136–145)
WBC # BLD AUTO: 7.2 X10*3/UL (ref 4.4–11.3)

## 2024-01-06 PROCEDURE — 2500000004 HC RX 250 GENERAL PHARMACY W/ HCPCS (ALT 636 FOR OP/ED): Mod: IPSPLIT | Performed by: NURSE PRACTITIONER

## 2024-01-06 PROCEDURE — 1100000001 HC PRIVATE ROOM DAILY: Mod: IPSPLIT

## 2024-01-06 PROCEDURE — 2500000001 HC RX 250 WO HCPCS SELF ADMINISTERED DRUGS (ALT 637 FOR MEDICARE OP): Mod: IPSPLIT | Performed by: NURSE PRACTITIONER

## 2024-01-06 PROCEDURE — 80048 BASIC METABOLIC PNL TOTAL CA: CPT | Mod: IPSPLIT | Performed by: NURSE PRACTITIONER

## 2024-01-06 PROCEDURE — 36415 COLL VENOUS BLD VENIPUNCTURE: CPT | Mod: IPSPLIT | Performed by: NURSE PRACTITIONER

## 2024-01-06 PROCEDURE — 2500000004 HC RX 250 GENERAL PHARMACY W/ HCPCS (ALT 636 FOR OP/ED): Mod: IPSPLIT

## 2024-01-06 PROCEDURE — 99233 SBSQ HOSP IP/OBS HIGH 50: CPT

## 2024-01-06 PROCEDURE — 2500000001 HC RX 250 WO HCPCS SELF ADMINISTERED DRUGS (ALT 637 FOR MEDICARE OP): Mod: IPSPLIT

## 2024-01-06 PROCEDURE — 83036 HEMOGLOBIN GLYCOSYLATED A1C: CPT | Mod: GENLAB

## 2024-01-06 PROCEDURE — 85027 COMPLETE CBC AUTOMATED: CPT | Mod: IPSPLIT | Performed by: NURSE PRACTITIONER

## 2024-01-06 RX ORDER — OXYCODONE HYDROCHLORIDE 5 MG/1
5 TABLET ORAL EVERY 4 HOURS PRN
Status: DISCONTINUED | OUTPATIENT
Start: 2024-01-06 | End: 2024-01-07 | Stop reason: HOSPADM

## 2024-01-06 RX ORDER — KETOROLAC TROMETHAMINE 30 MG/ML
30 INJECTION, SOLUTION INTRAMUSCULAR; INTRAVENOUS EVERY 6 HOURS PRN
Status: DISCONTINUED | OUTPATIENT
Start: 2024-01-06 | End: 2024-01-07 | Stop reason: HOSPADM

## 2024-01-06 RX ORDER — SODIUM CHLORIDE 9 MG/ML
INJECTION, SOLUTION INTRAVENOUS
Status: COMPLETED
Start: 2024-01-06 | End: 2024-01-06

## 2024-01-06 RX ORDER — TRAMADOL HYDROCHLORIDE 50 MG/1
50 TABLET ORAL EVERY 6 HOURS PRN
Status: DISCONTINUED | OUTPATIENT
Start: 2024-01-06 | End: 2024-01-07 | Stop reason: HOSPADM

## 2024-01-06 RX ORDER — ACETAMINOPHEN 325 MG/1
975 TABLET ORAL EVERY 8 HOURS
Status: DISCONTINUED | OUTPATIENT
Start: 2024-01-06 | End: 2024-01-07 | Stop reason: HOSPADM

## 2024-01-06 RX ADMIN — OXYCODONE HYDROCHLORIDE 5 MG: 5 TABLET ORAL at 14:17

## 2024-01-06 RX ADMIN — PIPERACILLIN SODIUM AND TAZOBACTAM SODIUM 4.5 G: 4; .5 INJECTION, SOLUTION INTRAVENOUS at 00:07

## 2024-01-06 RX ADMIN — TRAMADOL HYDROCHLORIDE 50 MG: 50 TABLET, COATED ORAL at 16:17

## 2024-01-06 RX ADMIN — ACETAMINOPHEN 975 MG: 325 TABLET ORAL at 17:50

## 2024-01-06 RX ADMIN — METOPROLOL TARTRATE 25 MG: 25 TABLET, FILM COATED ORAL at 16:17

## 2024-01-06 RX ADMIN — PIPERACILLIN SODIUM AND TAZOBACTAM SODIUM 4.5 G: 4; .5 INJECTION, SOLUTION INTRAVENOUS at 06:02

## 2024-01-06 RX ADMIN — OXYCODONE HYDROCHLORIDE 5 MG: 5 TABLET ORAL at 06:07

## 2024-01-06 RX ADMIN — GABAPENTIN 600 MG: 300 CAPSULE ORAL at 20:17

## 2024-01-06 RX ADMIN — PIPERACILLIN SODIUM AND TAZOBACTAM SODIUM 4.5 G: 4; .5 INJECTION, SOLUTION INTRAVENOUS at 17:51

## 2024-01-06 RX ADMIN — KETOROLAC TROMETHAMINE 30 MG: 30 INJECTION, SOLUTION INTRAMUSCULAR; INTRAVENOUS at 20:19

## 2024-01-06 RX ADMIN — KETOROLAC TROMETHAMINE 30 MG: 30 INJECTION, SOLUTION INTRAMUSCULAR; INTRAVENOUS at 11:00

## 2024-01-06 RX ADMIN — SERTRALINE HYDROCHLORIDE 100 MG: 50 TABLET ORAL at 08:51

## 2024-01-06 RX ADMIN — GABAPENTIN 600 MG: 300 CAPSULE ORAL at 16:16

## 2024-01-06 RX ADMIN — GABAPENTIN 600 MG: 300 CAPSULE ORAL at 08:50

## 2024-01-06 RX ADMIN — APIXABAN 5 MG: 5 TABLET, FILM COATED ORAL at 08:51

## 2024-01-06 RX ADMIN — METOPROLOL TARTRATE 25 MG: 25 TABLET, FILM COATED ORAL at 08:51

## 2024-01-06 RX ADMIN — OXYCODONE HYDROCHLORIDE 5 MG: 5 TABLET ORAL at 23:05

## 2024-01-06 RX ADMIN — VANCOMYCIN 1250 MG: 1.75 INJECTION, SOLUTION INTRAVENOUS at 04:38

## 2024-01-06 RX ADMIN — APIXABAN 5 MG: 5 TABLET, FILM COATED ORAL at 20:17

## 2024-01-06 RX ADMIN — ACETAMINOPHEN 975 MG: 325 TABLET ORAL at 11:00

## 2024-01-06 RX ADMIN — PANTOPRAZOLE SODIUM 40 MG: 40 TABLET, DELAYED RELEASE ORAL at 06:06

## 2024-01-06 RX ADMIN — VANCOMYCIN 1250 MG: 1.75 INJECTION, SOLUTION INTRAVENOUS at 16:18

## 2024-01-06 RX ADMIN — LISINOPRIL 10 MG: 10 TABLET ORAL at 08:51

## 2024-01-06 RX ADMIN — PIPERACILLIN SODIUM AND TAZOBACTAM SODIUM 4.5 G: 4; .5 INJECTION, SOLUTION INTRAVENOUS at 11:04

## 2024-01-06 ASSESSMENT — PAIN - FUNCTIONAL ASSESSMENT
PAIN_FUNCTIONAL_ASSESSMENT: 0-10

## 2024-01-06 ASSESSMENT — PAIN SCALES - GENERAL
PAINLEVEL_OUTOF10: 6
PAINLEVEL_OUTOF10: 4
PAINLEVEL_OUTOF10: 6
PAINLEVEL_OUTOF10: 7
PAINLEVEL_OUTOF10: 7
PAINLEVEL_OUTOF10: 4
PAINLEVEL_OUTOF10: 7
PAINLEVEL_OUTOF10: 8
PAINLEVEL_OUTOF10: 7
PAINLEVEL_OUTOF10: 7

## 2024-01-06 ASSESSMENT — PAIN DESCRIPTION - LOCATION
LOCATION: BACK
LOCATION: FOOT

## 2024-01-06 ASSESSMENT — PAIN DESCRIPTION - ORIENTATION
ORIENTATION: RIGHT;LEFT
ORIENTATION: LEFT
ORIENTATION: LEFT

## 2024-01-06 NOTE — CARE PLAN
The clinical goals for the shift include Pain will be controlled to 5/10 or less today  Pt up in room ad linsey. He c/o pain in left foot--it has 1-2+ edema and is veda. Apetite good. Afebrile. Getting Tylenol ATC, oxy, ultram and toradol for pain. Rates it usually at 7/10.

## 2024-01-06 NOTE — CARE PLAN
The patient's goals for the shift include      The clinical goals for the shift include patient to report a tolerable pain level all shift    Patient always has chronic pain he states but requested pain medication for Left foot only once this shift. Slept off and on this shift due to discomfort in bed sat up about 1/3 of the time. Tolerated room air while awake but needed 2-3L NC during sleep for apnic spells. Tolerated Multiple ABX throughout shift.

## 2024-01-06 NOTE — PROGRESS NOTES
"Rome Mullins is a 63 y.o. male on day 2 of admission presenting with Cellulitis.    Subjective     No overnight events reported.     Patient sitting up on the edge of the bed, reports 7/10 pain in his foot. He has been able to ambulate without difficulty. No fevers or chills. He remains on Vancomycin and Zosyn per ID recs. Plan of care reviewed with patient, all questions answered at this time.        Objective     Physical Exam  Constitutional:       General: He is not in acute distress.     Appearance: He is obese. He is not toxic-appearing.   HENT:      Head: Normocephalic and atraumatic.      Mouth/Throat:      Mouth: Mucous membranes are moist.   Eyes:      Conjunctiva/sclera: Conjunctivae normal.   Cardiovascular:      Rate and Rhythm: Normal rate and regular rhythm.   Pulmonary:      Effort: No respiratory distress.      Breath sounds: Normal breath sounds.   Abdominal:      General: There is no distension.      Palpations: Abdomen is soft.      Tenderness: There is no abdominal tenderness.   Musculoskeletal:         General: No tenderness.   Skin:     General: Skin is warm and dry.      Findings: Erythema (left foot) present. No rash.   Neurological:      Mental Status: He is alert and oriented to person, place, and time.   Psychiatric:         Mood and Affect: Mood normal.         Behavior: Behavior normal.         Last Recorded Vitals  Blood pressure 132/65, pulse 61, temperature 36.5 °C (97.7 °F), temperature source Temporal, resp. rate 17, height 1.854 m (6' 1\"), weight 123 kg (271 lb), SpO2 93 %.  Intake/Output last 3 Shifts:  I/O last 3 completed shifts:  In: 1830 (14.9 mL/kg) [P.O.:420; IV Piggyback:1410]  Out: 750 (6.1 mL/kg) [Urine:750 (0.2 mL/kg/hr)]  Weight: 122.9 kg     Relevant Results          Scheduled medications  acetaminophen, 975 mg, oral, q8h  apixaban, 5 mg, oral, BID  influenza, 0.5 mL, intramuscular, During hospitalization  gabapentin, 600 mg, oral, TID  lisinopril, 10 mg, oral, " Daily  metoprolol tartrate, 25 mg, oral, BID with meals  pantoprazole, 40 mg, oral, Daily before breakfast   Or  pantoprazole, 40 mg, intravenous, Daily before breakfast  piperacillin-tazobactam, 4.5 g, intravenous, q6h  pneumococcal conjugate, 0.5 mL, intramuscular, During hospitalization  sertraline, 100 mg, oral, Daily  vancomycin-diluent combo no.1, 1,250 mg, intravenous, q12h      Continuous medications     PRN medications  PRN medications: ketorolac, ondansetron **OR** ondansetron, oxyCODONE, polyethylene glycol, traMADol    Results for orders placed or performed during the hospital encounter of 01/04/24 (from the past 24 hour(s))   Vascular US ankle brachial index (MRAION) without exercise   Result Value Ref Range    BSA 2.52 m2   CBC   Result Value Ref Range    WBC 7.2 4.4 - 11.3 x10*3/uL    nRBC 0.0 0.0 - 0.0 /100 WBCs    RBC 4.49 (L) 4.50 - 5.90 x10*6/uL    Hemoglobin 13.2 (L) 13.5 - 17.5 g/dL    Hematocrit 40.3 (L) 41.0 - 52.0 %    MCV 90 80 - 100 fL    MCH 29.4 26.0 - 34.0 pg    MCHC 32.8 32.0 - 36.0 g/dL    RDW 13.6 11.5 - 14.5 %    Platelets 330 150 - 450 x10*3/uL   Basic metabolic panel   Result Value Ref Range    Glucose 101 (H) 74 - 99 mg/dL    Sodium 135 (L) 136 - 145 mmol/L    Potassium 4.0 3.5 - 5.3 mmol/L    Chloride 101 98 - 107 mmol/L    Bicarbonate 25 21 - 32 mmol/L    Anion Gap 13 10 - 20 mmol/L    Urea Nitrogen 15 6 - 23 mg/dL    Creatinine 0.91 0.50 - 1.30 mg/dL    eGFR >90 >60 mL/min/1.73m*2    Calcium 8.7 8.6 - 10.3 mg/dL     Vascular US Ankle Brachial Index (MARION) Without Exercise    Result Date: 1/5/2024  Preliminary Cardiology Report           [place] [address]  Tel [phone] and Fax [phone]            Preliminary Vascular Lab Report  [facility] ANKLE BRACHIAL INDEX (MARION) WITHOUT EXERCISE  Patient Name:      GILLES OLBO JOANA Boelus Physician: Chastity Fox MD Study Date:        1/5/2024         [facility]           [id number] [address]                                      Physician: MRN/PID:           [id number]         Technologist:      [facility]eli Bellamy [facility] Accession#:        [id number]     Technologist 2: Date of Birth/Age: 1960        Encounter#:        [id number] Gender:            M Admission Status:  Inpatient        Location           [facility]                                     Performed:  Diagnosis/ICD: Venous insufficiency (chronic) (peripheral)-I87.2 Procedure/CPT: 04809 Peripheral artery MARION Only  Patient History: HTN, Hyperlipidemia, Hypercoagulability, Leg pain, Obesity,                  Previous DVT and LE Edema. Obesity:         Obese >30.  PRELIMINARY CONCLUSIONS: Bilateral Lower PVR: No evidence of arterial occlusive disease bilaterally in the lower extremities at rest. Triphasic flow is noted in the posterior tibial arteries, dorsalis pedis arteries and common femoral arteries. Right Lower PVR: No evidence of arterial occlusive disease in the right lower extremity at rest. Decreased digital perfusion noted. Triphasic flow is noted in the right common femoral artery, right posterior tibial artery and right dorsalis pedis artery. Left Lower PVR: No evidence of arterial occlusive disease in the left lower extremity at rest. Decreased digital perfusion noted. Triphasic flow is noted in the left common femoral artery, left dorsalis pedis artery and left posterior tibial artery.  Imaging & Doppler Findings:  RIGHT Lower PVR                Pressures Ratios Right Posterior Tibial (Ankle) 145 mmHg  1.22 Right Dorsalis Pedis (Ankle)   133 mmHg  1.12 Right Digit (Great Toe)        50 mmHg   0.42   LEFT Lower PVR                Pressures Ratios Left Posterior Tibial (Ankle) 120 mmHg  1.01 Left Dorsalis Pedis (Ankle)   123 mmHg  1.03 Left Digit (Great Toe)        49 mmHg   0.41                      Right     Left Brachial Pressure 119 mmHg 106 mmHg   VASCULAR PRELIMINARY  REPORT completed by Yaneli Bellamy RDMS on 1/5/2024 at 8:15:15 PM  ** Final **     CT angio chest for pulmonary embolism    Result Date: 1/5/2024  Interpreted By:  Sathish Zelaya, STUDY: CT ANGIO CHEST FOR PULMONARY EMBOLISM;  1/5/2024 1:06 pm   INDICATION: Signs/Symptoms:elevated d-dimer.   COMPARISON: 08/21/2023   ACCESSION NUMBER(S): [id number]   ORDERING CLINICIAN: IRINA QUIROZ   TECHNIQUE: Helical data acquisition of the chest was obtained following the intravenous administration of  75 ml of contrast/Omnipaque 350. Images were reformatted in axial, coronal, and sagittal planes. 3D post processing was performed on an independent workstation and volume rendered images of the pulmonary arteries were created and utilized in the interpretation.   FINDINGS: POTENTIAL LIMITATIONS OF THE STUDY:   Respiratory motion artifact. Beam hardening artifact.   HEART AND VESSELS: No filling defects are identified within the pulmonary arteries to indicate the presence of a pulmonary embolism. However, some of the segmental and subsegmental pulmonary arterial branches could not be adequately evaluated secondary to the limitations described above.   There are atherosclerotic calcifications of the aorta and its branches. Aorta is unchanged in course and caliber.   Heart is enlarged but unchanged in size.   No pericardial effusion is seen.   MEDIASTINUM AND PATTI, LOWER NECK AND AXILLA: The visualized thyroid gland is within normal limits.   There are borderline to mildly prominent hilar and mediastinal lymph nodes, measuring up to 1.6 cm in short axis in the subcarinal region, not significantly changed when compared to the previous study. No axillary lymphadenopathy.   Esophagus appears within normal limits as seen.   LUNGS AND AIRWAYS: The trachea and central airways are patent. No endobronchial lesion.   There are patchy nonspecific ground-glass infiltrates throughout the lungs, similar to the prior examination. No new areas of  consolidation. Stable scattered areas of scarring/atelectasis as well. No sizable effusion or evidence of a pneumothorax.   UPPER ABDOMEN: The visualized subdiaphragmatic structures demonstrate no acute abnormality.   CHEST WALL AND OSSEOUS STRUCTURES: Degenerate changes. No acute process.       No evidence of a pulmonary embolism. Please note that some of the subsegmental and segmental pulmonary arterial branches could not be adequately evaluated secondary to the limitations described above. Scattered patchy nonspecific ground-glass opacities in the lungs, similar to the prior study. Additional scattered areas of atelectasis/scarring, also unchanged. Stable borderline to mildly prominent hilar and mediastinal lymph nodes. No significant change.   MACRO: None.   Signed by: Sathish Zelaya 1/5/2024 1:16 PM Dictation workstation:   [id number]    Vascular US lower extremity venous duplex right    Result Date: 1/5/2024  Interpreted By:  Sathish Zelaya, [facility]UDY: [facility]OWER EXTREMITY VENOUS DUPLEX RIGHT;  1/5/2024 12:45 pm   INDICATION: Signs/Symptoms:has hx dvt/pe and cellulitis.   COMPARISON: None.   ACCESSION NUMBER(S): [id number]   ORDERING CLINICIAN: IRINA QUIROZ   TECHNIQUE: Vascular ultrasound of the  right lower extremity was performed. Real-time compression views as well as Gray scale, color Doppler and spectral Doppler waveform analysis was performed.   FINDINGS: Evaluation of the visualized portions of the common femoral vein, proximal, mid, and distal femoral vein, and popliteal vein were performed.  Evaluation of the visualized portions of the calf veins was also performed.   The evaluated veins demonstrate normal compressibility. There is intact venous flow demonstrating normal respiratory variability and normal augmentation of flow with calf compression. Therefore, there is no ultrasonographic evidence for deep vein thrombosis within the evaluated veins.       No sonographic evidence for deep vein thrombosis  within the evaluated veins.   MACRO: None.   Signed by: Sathish Zelaya 1/5/2024 1:07 PM Dictation workstation:   [id number]    Lower extremity venous duplex left    Result Date: 1/4/2024  STUDY: Left lower extremity venous ultrasound; Completed Time: 1/4/2024 12:24 INDICATION: LLE swelling. COMPARISON: US LE Venous 12/23/2023 ACCESSION NUMBER(S): [id number] ORDERING CLINICIAN: SANJUANITA HERRERA TECHNIQUE: Ultrasound of the left lower extremity veins.  Multiple images were obtained. FINDINGS: The left common femoral, femoral, and popliteal veins demonstrated normal compressibility, normal phasic venous flow, and normal response to augmentation.  There is no evidence for echogenic thrombi.  The visualized deep calf veins are patent. The contralateral common femoral vein is free of thrombosis.    No evidence for DVT within the left lower extremity.  Signed by Paul Duncan MD    Vascular US lower extremity venous duplex bilateral    Result Date: 12/23/2023  Interpreted By:  Sathish Zelaya, STUDY: VASC US LOWER EXTREMITY VENOUS DUPLEX BILATERAL;  12/23/2023 7:41 am   INDICATION: Signs/Symptoms:possible DVT with elevated D-dimer.   COMPARISON: None.   ACCESSION NUMBER(S): [id number]   ORDERING CLINICIAN: SULEMAN SAAVEDRA   TECHNIQUE: Vascular ultrasound of the bilateral lower extremities was performed. Real-time compression views as well as Gray scale, color Doppler and spectral Doppler waveform analysis was performed.   FINDINGS: Evaluation of the visualized portions of the bilateral common femoral vein, proximal, mid, and distal femoral vein, and popliteal vein were performed.  Evaluation of the visualized portions of the calf veins was also performed.   The evaluated veins demonstrate normal compressibility. There is intact venous flow demonstrating normal respiratory variability and normal augmentation of flow with calf compression. Therefore, there is no ultrasonographic evidence for deep vein thrombosis within the  evaluated veins.       No sonographic evidence for deep vein thrombosis within the evaluated veins of the lower extremities bilaterally.   MACRO: None.   Signed by: Sathish Zelaya 12/23/2023 9:08 AM Dictation workstation:   [id number]    CT UROGRAM WO/W IVCON    Result Date: 12/14/2023  * * *Final Report* * * DATE OF EXAM: Dec 14 2023  1:52PM   MYC   0560  -  CT UROGRAM WO/W IVCON  / ACCESSION #  [id number] PROCEDURE REASON: Hematuria, unspecified type      * * * * Physician Interpretation * * * *  EXAMINATION:   CT ABDOMEN AND PELVIS WITHOUT AND WITH IV CONTRAST, INCLUDING EXCRETORY PHASE IMAGING (CT UROGRAM)  3D RECONSTRUCTIONS CLINICAL HISTORY: Hematuria. TECHNIQUE: CT urogram protocol including unenhanced, renal parenchymal phase and excretory phase  renal imaging was obtained following IV contrast.  Normal saline was also administered IV.  No oral contrast was given.  3D image post-processing was performed and archived at the request of the referring physician, on the CT scanner workstation without   concurrent physician supervision. MQ:  CTU_2 Contrast: IV:  150 ml of Omnipaque 300 IV Saline:  150 ml of 0.9% NACL  Solution Oral Contrast: None CT Radiation dose: Integrated dose-length product (DLP) for this visit =   4067 mGy*cm. CT Dose Reduction Employed: Automated exposure control (AEC) COMPARISON: None. RESULT: Kidneys and urinary tract: Right: There are no renal calculi or masses.  The opacified calices, renal pelvis and ureter are normal without dilation, filling defect, or stricture. Left: There are no renal calculi or masses.  The opacified calices, renal pelvis and ureter are normal without dilation, filling defect, or stricture. Bladder: No filling defect, calculus, focal or diffuse wall thickening. Slightly limited imaging due to streak artifact. Abdomen and Pelvis: Liver: No mass. Biliary: No bile duct dilation.  Gallbladder is absent. Spleen: No mass. No splenomegaly. Pancreas: No mass or duct  dilation. Adrenals: No mass.  GI tract: No dilation or wall thickening. Normal appendix Lymph nodes: No abdominal or pelvic lymphadenopathy. Mesentery/Peritoneum: No ascites or mass. Retroperitoneum: No mass. Vasculature: Atherosclerotic changes of the aorta and major branches. No aneurysm is identified. Pelvis: No mass, ascites or fluid collection. Bones and Soft Tissues: No significant finding. Lower thorax: Unremarkable.  (topogram) images: Unremarkable.    IMPRESSION: No renal calculi, obstructive changes, or evidence for renal or collecting system neoplasm. : LATRICE   Transcribe Date/Time: Dec 14 2023  2:42P Dictated by : ROOSEVELT MADRID MD This examination was interpreted and the report reviewed and electronically signed by: ROOSEVELT MADRID MD on Dec 14 2023  2:50PM  EST                Assessment/Plan   Principal Problem:    Cellulitis  Active Problems:    Failure of outpatient treatment    #Cellulitis of left foot with failure of outpatient treatment  - Was started on Keflex and Bactrim as outpatient, redness and swelling worsened so patient came to ED   - No leukocytosis   - Lactate 1.2  - D-dimer 864 on admission, DVT US RLE was negative for DVT   - DVT US LLE pending- was never completed despite patient's symptoms involving the left foot  - BCX no growth x 1 day   - No open wounds to culture   - MARION completed 1/5, preliminary report with no evidence of arterial occlusive disease   - Check A1c to rule out diabetic foot; A1c was 6.3 in April 2023   - Continue Vancomycin and Zosyn (day 2)   - Pain control: Tylenol 975 mg PO q8h scheduled, Toradol 30 mg IVP q6h PRN for moderate pain, tramadol PO q6h PRN for severe pain, oxycodone PO q4h PRN for breakthrough pain   - Monitor margins of cellulitis   - ID consulted, appreciate recs     #HTN  #Atrial fibrillation  #H/o PE   - CT PE on admission with no evidence of PE   - Continue apixaban, lisinopril, metoprolol tartrate  - Monitor HR and BP      #Neuropathy  - continue gabapentin    #Anxiety   - continue sertraline     DVT PPx: Apixaban  GI PPx: Protonix   Diet: Regular  Code Status: Full code     Disposition: Patient requires inpatient management at this time.     Total accumulated time spent face to face and not face to face preparing to see the patient, obtaining and reviewing separately obtained history; performing a medically appropriate examination and/or evaluation; counseling and educating the patient, family; ordering medications, tests, or procedures; referring and communicating with other health care professionals; documenting clinical information in the patient's medical record; independently interpreting results and communicating the results to the patient, family; and care coordination was 45 minutes.       Oksana Daniel PA-C

## 2024-01-07 VITALS
TEMPERATURE: 98.2 F | RESPIRATION RATE: 17 BRPM | HEIGHT: 73 IN | SYSTOLIC BLOOD PRESSURE: 128 MMHG | HEART RATE: 57 BPM | BODY MASS INDEX: 35.92 KG/M2 | OXYGEN SATURATION: 96 % | DIASTOLIC BLOOD PRESSURE: 57 MMHG | WEIGHT: 271 LBS

## 2024-01-07 LAB
ANION GAP SERPL CALC-SCNC: 11 MMOL/L (ref 10–20)
BACTERIA UR CULT: NO GROWTH
BUN SERPL-MCNC: 19 MG/DL (ref 6–23)
CALCIUM SERPL-MCNC: 8.5 MG/DL (ref 8.6–10.3)
CHLORIDE SERPL-SCNC: 105 MMOL/L (ref 98–107)
CO2 SERPL-SCNC: 22 MMOL/L (ref 21–32)
CREAT SERPL-MCNC: 0.96 MG/DL (ref 0.5–1.3)
ERYTHROCYTE [DISTWIDTH] IN BLOOD BY AUTOMATED COUNT: 13.7 % (ref 11.5–14.5)
EST. AVERAGE GLUCOSE BLD GHB EST-MCNC: 140 MG/DL
GFR SERPL CREATININE-BSD FRML MDRD: 90 ML/MIN/1.73M*2
GLUCOSE SERPL-MCNC: 105 MG/DL (ref 74–99)
HBA1C MFR BLD: 6.5 %
HCT VFR BLD AUTO: 41 % (ref 41–52)
HGB BLD-MCNC: 13.2 G/DL (ref 13.5–17.5)
MCH RBC QN AUTO: 28.8 PG (ref 26–34)
MCHC RBC AUTO-ENTMCNC: 32.2 G/DL (ref 32–36)
MCV RBC AUTO: 90 FL (ref 80–100)
NRBC BLD-RTO: 0 /100 WBCS (ref 0–0)
PLATELET # BLD AUTO: 332 X10*3/UL (ref 150–450)
POTASSIUM SERPL-SCNC: 3.9 MMOL/L (ref 3.5–5.3)
RBC # BLD AUTO: 4.58 X10*6/UL (ref 4.5–5.9)
SODIUM SERPL-SCNC: 134 MMOL/L (ref 136–145)
WBC # BLD AUTO: 6.9 X10*3/UL (ref 4.4–11.3)

## 2024-01-07 PROCEDURE — 2500000001 HC RX 250 WO HCPCS SELF ADMINISTERED DRUGS (ALT 637 FOR MEDICARE OP): Mod: IPSPLIT

## 2024-01-07 PROCEDURE — 94760 N-INVAS EAR/PLS OXIMETRY 1: CPT | Mod: IPSPLIT

## 2024-01-07 PROCEDURE — 2500000001 HC RX 250 WO HCPCS SELF ADMINISTERED DRUGS (ALT 637 FOR MEDICARE OP): Mod: IPSPLIT | Performed by: NURSE PRACTITIONER

## 2024-01-07 PROCEDURE — 99231 SBSQ HOSP IP/OBS SF/LOW 25: CPT

## 2024-01-07 PROCEDURE — 80048 BASIC METABOLIC PNL TOTAL CA: CPT | Mod: IPSPLIT | Performed by: NURSE PRACTITIONER

## 2024-01-07 PROCEDURE — 85027 COMPLETE CBC AUTOMATED: CPT | Mod: IPSPLIT | Performed by: NURSE PRACTITIONER

## 2024-01-07 PROCEDURE — 2500000004 HC RX 250 GENERAL PHARMACY W/ HCPCS (ALT 636 FOR OP/ED): Mod: IPSPLIT | Performed by: NURSE PRACTITIONER

## 2024-01-07 PROCEDURE — 36415 COLL VENOUS BLD VENIPUNCTURE: CPT | Mod: IPSPLIT | Performed by: NURSE PRACTITIONER

## 2024-01-07 RX ORDER — AMOXICILLIN AND CLAVULANATE POTASSIUM 875; 125 MG/1; MG/1
1 TABLET, FILM COATED ORAL 2 TIMES DAILY
Qty: 8 TABLET | Refills: 0 | Status: SHIPPED | OUTPATIENT
Start: 2024-01-07 | End: 2024-01-11

## 2024-01-07 RX ORDER — TRAMADOL HYDROCHLORIDE 50 MG/1
50 TABLET ORAL EVERY 6 HOURS PRN
Qty: 12 TABLET | Refills: 0 | Status: SHIPPED | OUTPATIENT
Start: 2024-01-07 | End: 2024-01-10

## 2024-01-07 RX ORDER — POLYETHYLENE GLYCOL 3350 17 G/17G
17 POWDER, FOR SOLUTION ORAL DAILY PRN
Qty: 10 EACH | Refills: 0 | Status: SHIPPED | OUTPATIENT
Start: 2024-01-07 | End: 2024-04-15 | Stop reason: WASHOUT

## 2024-01-07 RX ADMIN — PIPERACILLIN SODIUM AND TAZOBACTAM SODIUM 4.5 G: 4; .5 INJECTION, SOLUTION INTRAVENOUS at 06:03

## 2024-01-07 RX ADMIN — SERTRALINE HYDROCHLORIDE 100 MG: 50 TABLET ORAL at 08:26

## 2024-01-07 RX ADMIN — APIXABAN 5 MG: 5 TABLET, FILM COATED ORAL at 08:26

## 2024-01-07 RX ADMIN — ACETAMINOPHEN 975 MG: 325 TABLET ORAL at 11:22

## 2024-01-07 RX ADMIN — ACETAMINOPHEN 975 MG: 325 TABLET ORAL at 00:26

## 2024-01-07 RX ADMIN — PANTOPRAZOLE SODIUM 40 MG: 40 TABLET, DELAYED RELEASE ORAL at 06:01

## 2024-01-07 RX ADMIN — PIPERACILLIN SODIUM AND TAZOBACTAM SODIUM 4.5 G: 4; .5 INJECTION, SOLUTION INTRAVENOUS at 00:07

## 2024-01-07 RX ADMIN — GABAPENTIN 600 MG: 300 CAPSULE ORAL at 08:27

## 2024-01-07 RX ADMIN — TRAMADOL HYDROCHLORIDE 50 MG: 50 TABLET, COATED ORAL at 04:26

## 2024-01-07 RX ADMIN — POLYETHYLENE GLYCOL 3350 17 G: 17 POWDER, FOR SOLUTION ORAL at 08:23

## 2024-01-07 RX ADMIN — VANCOMYCIN 1250 MG: 1.75 INJECTION, SOLUTION INTRAVENOUS at 04:24

## 2024-01-07 RX ADMIN — METOPROLOL TARTRATE 25 MG: 25 TABLET, FILM COATED ORAL at 08:25

## 2024-01-07 RX ADMIN — PIPERACILLIN SODIUM AND TAZOBACTAM SODIUM 4.5 G: 4; .5 INJECTION, SOLUTION INTRAVENOUS at 11:39

## 2024-01-07 RX ADMIN — OXYCODONE HYDROCHLORIDE 5 MG: 5 TABLET ORAL at 08:25

## 2024-01-07 RX ADMIN — LISINOPRIL 10 MG: 10 TABLET ORAL at 08:25

## 2024-01-07 ASSESSMENT — PAIN SCALES - GENERAL
PAINLEVEL_OUTOF10: 7
PAINLEVEL_OUTOF10: 5 - MODERATE PAIN
PAINLEVEL_OUTOF10: 0 - NO PAIN
PAINLEVEL_OUTOF10: 5 - MODERATE PAIN
PAINLEVEL_OUTOF10: 5 - MODERATE PAIN
PAINLEVEL_OUTOF10: 6
PAINLEVEL_OUTOF10: 5 - MODERATE PAIN

## 2024-01-07 ASSESSMENT — PAIN - FUNCTIONAL ASSESSMENT
PAIN_FUNCTIONAL_ASSESSMENT: 0-10
PAIN_FUNCTIONAL_ASSESSMENT: WONG-BAKER FACES

## 2024-01-07 ASSESSMENT — PAIN DESCRIPTION - ORIENTATION
ORIENTATION: LEFT
ORIENTATION: LOWER

## 2024-01-07 ASSESSMENT — PAIN DESCRIPTION - LOCATION
LOCATION: BACK
LOCATION: BACK
LOCATION: FOOT

## 2024-01-07 ASSESSMENT — PAIN SCALES - WONG BAKER: WONGBAKER_NUMERICALRESPONSE: NO HURT

## 2024-01-07 NOTE — CARE PLAN
The clinical goals for the shift include Pulse ox will be >90% on room air  Pt being discharged to home--he will be on antibiotic--aware to  meds at pharmacy. Has left foot pain--Ultram ordered for home. Left foot with non-pitting edema--both feet are veda. Has 2+ pulses. Pulse ox >90% on room air today. Up ad linsey. Will FU with PCP, pulmonology, podiatry, and vascular.

## 2024-01-07 NOTE — DISCHARGE INSTRUCTIONS
You may take Tylenol every 8 hours scheduled for pain control. Take ibuprofen as needed for moderate pain and tramadol as needed for severe pain.     Cellulitis (Skin Infection) Discharge Instructions, Adult    About this topic  Cellulitis is a skin infection. All people have germs on their skin. Most of the time, these germs do not cause a problem. A skin infection means the germs have gotten into the layers of your skin. Germs can enter your body from a cut, scratch, or bite. The infected part gets red, warm, painful, swollen, and irritated. You need antibiotics to treat the infection. It is important to take all of your antibiotics even if you start to feel better. If not, your infection may come back and be more serious than before.    What care is needed at home?  Ask your doctor what you need to do when you go home. Make sure you ask questions if you do not understand what the doctor says. This way you will know what you need to do.  Prop your painful body part on pillows, keeping it above the level of your heart. This may help lessen pain and swelling.  Keep your infected area clean and dry. Do not squeeze, scratch, or rub it. You can gently wash the area with soap and water or take a shower. Pat the area dry with a clean towel.  Wash your hands before and after you touch your infected area.  Do not put an antibiotic ointment on the infected area.  Use clean, warm compresses to help ease pain and swelling. Wet a clean wash cloth or small towel with hot water. Put it on the area for 5 to 10 minutes.  You can draw a line with a waterproof marker around the red area to see if it is getting bigger or smaller.  What follow-up care is needed?  Your doctor may ask you to make visits to the office to check on your progress. Be sure to keep these visits.  If surgery was needed to drain the infection, your doctor may not stitch the area closed. Keep this area clean and covered.  If you do have stitches or staples, you  will need to have them taken out. Your doctor will often want to do this in 1 to 2 weeks.  What drugs may be needed?  The doctor may order drugs to:  Treat the infection. Finish all of the antibiotics, even if the infection appears to have gone away.  Help with pain and swelling  Will physical activity be limited?  Your activity may be decreased if a leg or arm is infected. It may be painful to move that part of the body. You may not feel well for a few days until the drug to treat the infection starts working.  What problems could happen?  Infection in the blood. This is sepsis and is very serious.  Bone infection  Inflammation of the lymph vessels  Inflammation of the heart  Meningitis  Shock  Tissue death or gangrene  What can be done to prevent this health problem?  Wear proper clothing and shoes to cover your body and prevent cuts and bruises.  Keep your nails trimmed to avoid scratches.  Keep skin moisturized. Use lotion that does not have fragrance added to avoid dry, cracked skin.  Treat athlete's foot as directed by your doctor.  If you have a chronic skin condition, talk with your doctor about how to keep it under the best control.  If you have chronic swelling (edema) of an arm or leg, talk with your doctor about how to lower the swelling. Your doctor may want you to wear support stockings.  Practice good hygiene. Wash your hands often with soap and water.    If you often have fungal infections, ask your doctor if you should be using an antifungal drug to prevent the cellulitis from occurring again.  If you scratch or cut your skin, wash the area carefully with soap and water.  When do I need to call the doctor?  Signs of infection. These include a fever of 100.4°F (38°C) or higher, chills, or wound that will not heal.  You have a fever of 100.4°F (38°C) or higher or chills.  The area becomes more red, swollen, or painful.  The redness or swelling spreads up your leg or arm or to a larger area.  The  infected area is not better after 3 days of taking antibiotics.  Teach Back: Helping You Understand  The Teach Back Method helps you understand the information we are giving you. After you talk with the staff, tell them in your own words what you learned. This helps to make sure the staff has described each thing clearly. It also helps to explain things that may have been confusing. Before going home, make sure you can do these:  I can tell you about my condition.  I can tell you about to care for my wound.  I can tell you what I will do if I have swelling, redness, or warmth around my wound.

## 2024-01-07 NOTE — CARE PLAN
Problem: Pain  Goal: Takes deep breaths with improved pain control throughout the shift  Outcome: Progressing     Problem: Pain  Goal: Turns in bed with improved pain control throughout the shift  Outcome: Progressing     Problem: Pain  Goal: Walks with improved pain control throughout the shift  Outcome: Progressing     Problem: Pain  Goal: Performs ADL's with improved pain control throughout shift  Outcome: Progressing     Problem: Pain  Goal: Participates in PT with improved pain control throughout the shift  Outcome: Progressing     Problem: Pain - Adult  Goal: Verbalizes/displays adequate comfort level or baseline comfort level  Outcome: Progressing     Problem: Discharge Planning  Goal: Discharge to home or other facility with appropriate resources  Outcome: Progressing   The patient's goals for the shift include      The clinical goals for the shift include Pain will be controlled to 5/10 or less today    Over the shift, the patient did make progress toward the following goals.

## 2024-01-08 NOTE — DISCHARGE SUMMARY
Discharge Diagnosis  Cellulitis    Issues Requiring Follow-Up    Follow up with podiatry as outpatient for toenail care       Discharge Meds     Your medication list        START taking these medications        Instructions Last Dose Given Next Dose Due   amoxicillin-pot clavulanate 875-125 mg tablet  Commonly known as: Augmentin      Take 1 tablet (875 mg) by mouth 2 times a day for 4 days.       polyethylene glycol 17 gram packet  Commonly known as: Glycolax, Miralax      Take 17 g by mouth once daily as needed (constipation).       traMADol 50 mg tablet  Commonly known as: Ultram      Take 1 tablet (50 mg) by mouth every 6 hours if needed for severe pain (7 - 10) for up to 3 days.              CONTINUE taking these medications        Instructions Last Dose Given Next Dose Due   acetaminophen 500 mg tablet  Commonly known as: Tylenol           apixaban 5 mg tablet  Commonly known as: Eliquis           gabapentin 600 mg tablet  Commonly known as: Neurontin           lisinopril 10 mg tablet           metoprolol tartrate 25 mg tablet  Commonly known as: Lopressor           sildenafil 100 mg tablet  Commonly known as: Viagra           Zoloft 100 mg tablet  Generic drug: sertraline                  STOP taking these medications      cephalexin 500 mg capsule  Commonly known as: Keflex        sulfamethoxazole-trimethoprim 800-160 mg tablet  Commonly known as: Bactrim DS                  Where to Get Your Medications        These medications were sent to [id number] [place] [id number] [address] [address]      Phone: [phone]   amoxicillin-pot clavulanate 875-125 mg tablet  polyethylene glycol 17 gram packet  traMADol 50 mg tablet         Test Results Pending At Discharge  Pending Labs       Order Current Status    Extra Urine Gray Tube Collected (01/05/24 1210)    Urinalysis with Reflex Culture and Microscopic In process    Blood Culture Preliminary result    Blood  Culture Preliminary result            Hospital Course   Rome Mullins is a 63 y.o. male with a past medical history of fibromyalgia, PE/DVT (on eliquis), PVD, GERD, HLD, HTN, VICENTE, OA, Afib, anxiety/depression and multiple back surgeries including lumbar fusion presenting with cellulitis of left lower extremity.     #Cellulitis of left foot with failure of outpatient treatment (improving)   - Was started on Keflex and Bactrim as outpatient, redness and swelling worsened so patient came to ED   - No leukocytosis   - Lactate 1.2  - D-dimer 864 on admission, DVT US RLE was negative for DVT   - DVT US negative  - BCX no growth x 3 days  - No open wounds to culture   - MARION completed 1/5, preliminary report with no evidence of arterial occlusive disease   - A1c 6.5, was 6.3 in April 2023   - Continue Vancomycin and Zosyn (day 3)   - Pain control: Tylenol 975 mg PO q8h scheduled, Toradol 30 mg IVP q6h PRN for moderate pain, tramadol PO q6h PRN for severe pain, oxycodone PO q4h PRN for breakthrough pain   - Monitor margins of cellulitis   - ID consulted, agree with discharge on Augmentin BID for a total of 7 days  - Podiatry referral provided on discharge   - PCP follow up on discharge for elevated A1c    #HTN  #Atrial fibrillation  #H/o PE   - CT PE on admission with no evidence of PE   - Continue apixaban, lisinopril, metoprolol tartrate  - Monitor HR and BP      #Neuropathy  - continue gabapentin     #Anxiety   - continue sertraline     DVT PPx: Apixaban  GI PPx: Protonix   Diet: Regular  Code Status: Full code     Disposition: Patient stable for discharge home. He has been able to ambulate without significant pain. He is discharged on Augmentin for a total of 7 days of treatment per ID recs. OARRS reviewed, 3 day Rx for tramadol sent to pharmacy prior to discharge. Patient will follow up with his PCP and podiatry as an outpatient.     Total cumulative time spent in preparation of this discharge including documentation  review, coordination of care with the medical team including PT/SW/care coordinators and treating consultants, discussion with patient and pertinent family members and finalization of prescriptions, follow-up appointments, and this discharge summary was approximately 45 minutes.   Pertinent Physical Exam At Time of Discharge  Constitutional:       General: He is not in acute distress.     Appearance: He is obese. He is not toxic-appearing.   HENT:      Head: Normocephalic and atraumatic.      Mouth/Throat:      Mouth: Mucous membranes are moist.   Eyes:      Conjunctiva/sclera: Conjunctivae normal.   Cardiovascular:      Rate and Rhythm: Normal rate and regular rhythm.   Pulmonary:      Effort: No respiratory distress.      Breath sounds: Normal breath sounds.   Abdominal:      General: There is no distension.      Palpations: Abdomen is soft.      Tenderness: There is no abdominal tenderness.   Musculoskeletal:         General: No tenderness.   Skin:     General: Skin is warm and dry.      Findings: Erythema (left foot) present. No rash.   Neurological:      Mental Status: He is alert and oriented to person, place, and time.   Psychiatric:         Mood and Affect: Mood normal.         Behavior: Behavior normal.        Outpatient Follow-Up  Future Appointments   Date Time Provider Department Center   1/9/2024  3:00 PM PEE [place] [id number] [facility]   3/28/2024  4:00 PM PEE [place] [id number] [facility]   4/8/2024  1:00 PM PEE [place] [id number] [facility]         Oksana Daniel PA-C

## 2024-01-09 ENCOUNTER — APPOINTMENT (OUTPATIENT)
Dept: PRIMARY CARE | Facility: CLINIC | Age: 64
End: 2024-01-09
Payer: COMMERCIAL

## 2024-01-09 LAB
BACTERIA BLD CULT: NORMAL
BACTERIA BLD CULT: NORMAL

## 2024-01-10 PROCEDURE — 93922 UPR/L XTREMITY ART 2 LEVELS: CPT | Performed by: INTERNAL MEDICINE

## 2024-01-17 ENCOUNTER — OFFICE VISIT (OUTPATIENT)
Dept: PRIMARY CARE | Facility: CLINIC | Age: 64
End: 2024-01-17
Payer: COMMERCIAL

## 2024-01-17 VITALS
SYSTOLIC BLOOD PRESSURE: 136 MMHG | OXYGEN SATURATION: 94 % | WEIGHT: 280 LBS | TEMPERATURE: 97.2 F | BODY MASS INDEX: 36.94 KG/M2 | DIASTOLIC BLOOD PRESSURE: 76 MMHG | HEART RATE: 57 BPM

## 2024-01-17 DIAGNOSIS — E11.9 TYPE 2 DIABETES MELLITUS WITHOUT COMPLICATION, WITHOUT LONG-TERM CURRENT USE OF INSULIN (MULTI): ICD-10-CM

## 2024-01-17 DIAGNOSIS — I10 PRIMARY HYPERTENSION: ICD-10-CM

## 2024-01-17 DIAGNOSIS — Z12.5 ENCOUNTER FOR PROSTATE CANCER SCREENING: ICD-10-CM

## 2024-01-17 DIAGNOSIS — Z23 ENCOUNTER FOR IMMUNIZATION: ICD-10-CM

## 2024-01-17 DIAGNOSIS — E78.2 MIXED HYPERLIPIDEMIA: ICD-10-CM

## 2024-01-17 DIAGNOSIS — Z01.89 ENCOUNTER FOR ROUTINE LABORATORY TESTING: ICD-10-CM

## 2024-01-17 DIAGNOSIS — Z11.59 NEED FOR HEPATITIS C SCREENING TEST: ICD-10-CM

## 2024-01-17 DIAGNOSIS — Z86.2 HISTORY OF ANEMIA: ICD-10-CM

## 2024-01-17 DIAGNOSIS — L03.116 CELLULITIS OF LEFT LOWER EXTREMITY: Primary | ICD-10-CM

## 2024-01-17 PROCEDURE — 90677 PCV20 VACCINE IM: CPT | Performed by: NURSE PRACTITIONER

## 2024-01-17 PROCEDURE — 90686 IIV4 VACC NO PRSV 0.5 ML IM: CPT | Performed by: NURSE PRACTITIONER

## 2024-01-17 PROCEDURE — 1036F TOBACCO NON-USER: CPT | Performed by: NURSE PRACTITIONER

## 2024-01-17 PROCEDURE — 99214 OFFICE O/P EST MOD 30 MIN: CPT | Performed by: NURSE PRACTITIONER

## 2024-01-17 PROCEDURE — 3044F HG A1C LEVEL LT 7.0%: CPT | Performed by: NURSE PRACTITIONER

## 2024-01-17 PROCEDURE — 3075F SYST BP GE 130 - 139MM HG: CPT | Performed by: NURSE PRACTITIONER

## 2024-01-17 PROCEDURE — 3078F DIAST BP <80 MM HG: CPT | Performed by: NURSE PRACTITIONER

## 2024-01-17 PROCEDURE — 4010F ACE/ARB THERAPY RXD/TAKEN: CPT | Performed by: NURSE PRACTITIONER

## 2024-01-17 RX ORDER — METFORMIN HYDROCHLORIDE 500 MG/1
500 TABLET ORAL
Qty: 90 TABLET | Refills: 3 | Status: SHIPPED | OUTPATIENT
Start: 2024-01-17

## 2024-01-17 ASSESSMENT — ENCOUNTER SYMPTOMS
POLYDIPSIA: 0
POLYPHAGIA: 0
OCCASIONAL FEELINGS OF UNSTEADINESS: 0
DIAPHORESIS: 0
WOUND: 0
APPETITE CHANGE: 0
NAUSEA: 0
CHILLS: 0
FEVER: 0
LOSS OF SENSATION IN FEET: 0
DEPRESSION: 0
FATIGUE: 0
VOMITING: 0

## 2024-01-17 ASSESSMENT — PAIN SCALES - GENERAL: PAINLEVEL: 6

## 2024-01-17 ASSESSMENT — PATIENT HEALTH QUESTIONNAIRE - PHQ9
2. FEELING DOWN, DEPRESSED OR HOPELESS: NOT AT ALL
SUM OF ALL RESPONSES TO PHQ9 QUESTIONS 1 AND 2: 0
1. LITTLE INTEREST OR PLEASURE IN DOING THINGS: NOT AT ALL

## 2024-01-17 NOTE — PROGRESS NOTES
Formerly [facility]: [facility]  PROGRESS NOTE      Rome Mullins is a 63 y.o. male that is presenting today for Hospital follow up. [facility] 01/04 - 01/07/24 for Cellulitis of LLE.  He was started on Keflex and Bactrim as outpatient and presented to ED when symptoms persisted and worsened. US LLE was negative for DVT. Blood cultures showed no growth x3 days and there were no open wounds to culture. He was on Vacomycin and Zosyn for 3 days. ID consulted and agreed on oral Augmentin as outpt for total of 7 days. He was provided with referral to podiatry upon discharge for toenail care. He was discharged with Rx for Augmentin, Polyethylene glycol and Tramadol. He was instructed to follow up with PCP upon discharge for elevated A1c that 6.5 on 01/06/2024.    06/13/2023 due for repeat Colonoscopy and currently has appointment with Dr. Crowley 01/24/24.    Assessment/Plan   Diagnoses and all orders for this visit:  Cellulitis of left lower extremity        -      Complete resolution. No further intervention indicated.    Type 2 diabetes mellitus without complication, without long-term current use of insulin (CMS/[facility])  -     Start metFORMIN (Glucophage) 500 mg tablet; Take 1 tablet (500 mg) by mouth once daily with breakfast.  -     Referral to Population Health Services  -     Comprehensive Metabolic Panel; Future  -     Lipid Panel; Future  -     Hemoglobin A1C; Future  -     Albumin, urine, random; Future  -     Follow up for annual CPE as scheduled 04/2024    Primary hypertension  -     CBC and Auto Differential; Future  -     Comprehensive Metabolic Panel; Future  -     Lipid Panel; Future    Mixed hyperlipidemia  -     Comprehensive Metabolic Panel; Future  -     Lipid Panel; Future    Encounter for routine laboratory testing  -     CBC and Auto Differential; Future  -     Comprehensive Metabolic Panel; Future  -     Lipid Panel; Future  -     Hemoglobin A1C; Future  -     Prostate Specific  Antigen; Future    Encounter for prostate cancer screening  -     Prostate Specific Antigen; Future    Need for hepatitis C screening test  -     Hepatitis C antibody; Future    Encounter for immunization  -     Flu vaccine (IIV4) age 6 months and greater, preservative free  -     Pneumococcal conjugate vaccine, 20-valent (PREVNAR 20)    Subjective   Subjective  Rome Mullins is a 63 y.o. male who presents for evaluation of cellulitis located LLE. Patient reports he stopped taking outpatient antibioitics after 2 days due to diarrhea.  He spoke with a hospital provider who advised to continue taking it but he chose to stop and his diarrhea resolved. Symptoms include none. Patient denies chills, erythema located LLE and fever greater than 100. Precipitating event: cracked heels. Treatment to date has included antibiotics stopped 7 days ago with complete relief.    Objective  [unfilled]    Assessment/Plan  Cellulitis of the LLE  Symptoms resolved, no further intervention.    Subjective  Rome Mullins is a 63 y.o. male who presents with new onset of Type 2 diabetes.. Current symptoms include: none. Patient denies foot ulcerations, increased appetite, nausea, paresthesia of the feet, visual disturbances, vomiting, and weight loss. Evaluation to date has been: fasting lipid panel and hemoglobin A1C. Home sugars: patient does not check sugars. Current treatments: none.     [unfilled]     Objective  [unfilled]    Laboratory:  Lab Results       Component                Value               Date                       HGBA1C                   6.5 (H)             01/06/2024              Assessment/Plan  Diabetes mellitus Type II, under fair control.  Addressed ADA diet.  Suggested low cholesterol diet.  Encouraged aerobic exercise.  Discussed foot care.  Start metformin; see  medication orders.          Review of Systems   Constitutional:  Negative for appetite change, chills, diaphoresis, fatigue and fever.    Gastrointestinal:  Negative for nausea and vomiting.   Endocrine: Negative for polydipsia, polyphagia and polyuria.   Skin:  Negative for rash and wound.      Objective   Vitals:    01/17/24 1255   BP: 136/76   Pulse: 57   Temp: 36.2 °C (97.2 °F)   SpO2: 94%      Body mass index is 36.94 kg/m².  Physical Exam  Constitutional:       General: He is not in acute distress.  Cardiovascular:      Rate and Rhythm: Normal rate and regular rhythm.      Heart sounds: No murmur heard.  Pulmonary:      Effort: Pulmonary effort is normal.      Breath sounds: Normal breath sounds.   Musculoskeletal:         General: No swelling or tenderness.      Comments: LLE medial aspect superior to ankle with brown discoloration from healed infection. No dermal breakdown, warmth or tenderness.   Skin:     General: Skin is warm and dry.   Neurological:      General: No focal deficit present.      Mental Status: He is alert and oriented to person, place, and time.   Psychiatric:         Mood and Affect: Mood normal.       Diagnostic Results   Lab Results   Component Value Date    GLUCOSE 105 (H) 01/07/2024    CALCIUM 8.5 (L) 01/07/2024     (L) 01/07/2024    K 3.9 01/07/2024    CO2 22 01/07/2024     01/07/2024    BUN 19 01/07/2024    CREATININE 0.96 01/07/2024     Lab Results   Component Value Date    ALT 19 01/04/2024    AST 37 01/04/2024    ALKPHOS 69 01/04/2024    BILITOT 0.3 01/04/2024     Lab Results   Component Value Date    WBC 6.9 01/07/2024    HGB 13.2 (L) 01/07/2024    HCT 41.0 01/07/2024    MCV 90 01/07/2024     01/07/2024     Lab Results   Component Value Date    CHOL 177 04/20/2023    CHOL 165 04/01/2022    CHOL 173 05/15/2020     Lab Results   Component Value Date    HDL 41.9 04/20/2023    HDL 33.0 (A) 04/01/2022    HDL 35 (L) 05/15/2020     Lab Results   Component Value Date    LDLCALC 115 05/15/2020     Lab Results   Component Value Date    TRIG 122 04/20/2023    TRIG 93 04/01/2022    TRIG 113 05/15/2020  "    No components found for: \"CHOLHDL\"  Lab Results   Component Value Date    HGBA1C 6.5 (H) 2024     Other labs not included in the list above were reviewed either before or during this encounter.    History    Past Medical History:   Diagnosis Date    Allergic     Anxiety     Arthritis 2023    Clotting disorder (CMS/HCC)     Depression     Difficulty walking     HL (hearing loss) 2023    Hypertension 2000?    Urinary tract infection 2015     Past Surgical History:   Procedure Laterality Date    BACK SURGERY  2002    CHOLECYSTECTOMY  2008    HERNIA REPAIR  2011    JOINT REPLACEMENT  2010    SPINE SURGERY  2002    TOENAIL EXCISION  2019    TONSILLECTOMY  1971    WISDOM TOOTH EXTRACTION  1998     Family History   Problem Relation Name Age of Onset    Mental illness Mother Chey     Hypertension Mother Chey     Diabetes Mother Chey     Peripheral vascular disease Father Sd     Diabetes Father Sd     Hypertension Father Sd     Cancer Father Sd     No Known Problems Sister      No Known Problems Daughter      No Known Problems Son      Diabetes Other fam hx     Heart disease Other fam hx     Stroke Other fam hx     Hypertension Other fam hx     Cancer Other fam hx      Social History     Socioeconomic History    Marital status:      Spouse name: Not on file    Number of children: Not on file    Years of education: Not on file    Highest education level: Not on file   Occupational History    Not on file   Tobacco Use    Smoking status: Former     Packs/day: 1.00     Years: 13.00     Additional pack years: 0.00     Total pack years: 13.00     Types: Cigarettes, Cigars     Start date: 1974     Quit date: 1989     Years since quittin.9     Passive exposure: Never    Smokeless tobacco: Former   Substance and Sexual Activity    Alcohol use: Yes     Alcohol/week: 1.0 standard drink of alcohol     Types: 1 Cans of beer per week     " Comment: Sometimes 3    Drug use: Never    Sexual activity: Not Currently     Partners: Female   Other Topics Concern    Not on file   Social History Narrative    Not on file     Social Determinants of Health     Financial Resource Strain: Medium Risk (1/4/2024)    Overall Financial Resource Strain (CARDIA)     Difficulty of Paying Living Expenses: Somewhat hard   Food Insecurity: Not on file   Transportation Needs: No Transportation Needs (1/4/2024)    PRAPARE - Transportation     Lack of Transportation (Medical): No     Lack of Transportation (Non-Medical): No   Physical Activity: Not on file   Stress: Not on file   Social Connections: Unknown (1/5/2024)    Social Connection and Isolation Panel [NHANES]     Frequency of Communication with Friends and Family: Not on file     Frequency of Social Gatherings with Friends and Family: Not on file     Attends Taoist Services: Not on file     Active Member of Clubs or Organizations: Not on file     Attends Club or Organization Meetings: Not on file     Marital Status:    Intimate Partner Violence: Not At Risk (1/5/2024)    Humiliation, Afraid, Rape, and Kick questionnaire     Fear of Current or Ex-Partner: No     Emotionally Abused: No     Physically Abused: No     Sexually Abused: No   Housing Stability: Low Risk  (1/4/2024)    Housing Stability Vital Sign     Unable to Pay for Housing in the Last Year: No     Number of Places Lived in the Last Year: 1     Unstable Housing in the Last Year: No     Allergies   Allergen Reactions    Fentanyl Palpitations and Unknown     rapid heart rate    Duloxetine GI Upset    Venlafaxine GI Upset    Methadone Palpitations     Current Outpatient Medications on File Prior to Visit   Medication Sig Dispense Refill    acetaminophen (Tylenol) 500 mg tablet Take 2 tablets (1,000 mg) by mouth every 8 hours if needed.      apixaban (Eliquis) 5 mg tablet Take 1 tablet (5 mg) by mouth 2 times a day. with or without food      gabapentin  (Neurontin) 600 mg tablet Take 1 tablet (600 mg) by mouth 3 times a day.      lisinopril 10 mg tablet Take 1 tablet (10 mg) by mouth once daily.      metoprolol tartrate (Lopressor) 25 mg tablet Take 1 tablet (25 mg) by mouth 2 times a day with meals.      polyethylene glycol (Glycolax, Miralax) 17 gram packet Take 17 g by mouth once daily as needed (constipation). 10 each 0    sertraline (Zoloft) 100 mg tablet Take 1 tablet (100 mg) by mouth once daily.      sildenafil (Viagra) 100 mg tablet Take 1 tablet (100 mg) by mouth once daily as needed.      [] amoxicillin-pot clavulanate (Augmentin) 875-125 mg tablet Take 1 tablet (875 mg) by mouth 2 times a day for 4 days. 8 tablet 0    [] traMADol (Ultram) 50 mg tablet Take 1 tablet (50 mg) by mouth every 6 hours if needed for severe pain (7 - 10) for up to 3 days. 12 tablet 0     No current facility-administered medications on file prior to visit.     Immunization History   Administered Date(s) Administered    Flu vaccine (IIV4), preservative free *Check age/dose* 10/07/2020, 2023    Hep B Immune Globulin 2013, 2013    Influenza, Unspecified 10/01/2005    Influenza, injectable, quadrivalent 2015, 10/11/2016, 10/19/2017, 2018, 10/18/2019    Influenza, seasonal, injectable 10/22/2009, 2011, 10/17/2012, 11/15/2013, 11/15/2014    Moderna SARS-CoV-2 Vaccination 2022, 05/15/2022    Pneumococcal polysaccharide vaccine, 23-valent, age 2 years and older (PNEUMOVAX 23) 2005, 2006    Td vaccine, age 7 years and older (TDVAX) 2003, 2005, 2005    Tdap vaccine, age 7 year and older (BOOSTRIX) 2014     Patient's medical history was reviewed and updated either before or during this encounter.       Victoria Aguirre, APRN-CNP

## 2024-01-22 ENCOUNTER — APPOINTMENT (OUTPATIENT)
Dept: UROLOGY | Facility: CLINIC | Age: 64
End: 2024-01-22
Payer: COMMERCIAL

## 2024-01-23 ENCOUNTER — OFFICE VISIT (OUTPATIENT)
Dept: PODIATRY | Facility: CLINIC | Age: 64
End: 2024-01-23
Payer: COMMERCIAL

## 2024-01-23 ENCOUNTER — PATIENT OUTREACH (OUTPATIENT)
Dept: CARE COORDINATION | Facility: CLINIC | Age: 64
End: 2024-01-23

## 2024-01-23 DIAGNOSIS — M79.671 PAIN IN BOTH FEET: Primary | ICD-10-CM

## 2024-01-23 DIAGNOSIS — B35.1 ONYCHOMYCOSIS: ICD-10-CM

## 2024-01-23 DIAGNOSIS — I73.9 PVD (PERIPHERAL VASCULAR DISEASE) (CMS-HCC): ICD-10-CM

## 2024-01-23 DIAGNOSIS — M79.672 PAIN IN BOTH FEET: Primary | ICD-10-CM

## 2024-01-23 PROCEDURE — 99203 OFFICE O/P NEW LOW 30 MIN: CPT | Performed by: PODIATRIST

## 2024-01-23 PROCEDURE — 1036F TOBACCO NON-USER: CPT | Performed by: PODIATRIST

## 2024-01-23 NOTE — PROGRESS NOTES
Patient is scheduled for initial diabetes self management on 1/31/24 at 1:00 PM. Patient has agreed to a telehealth visit using the [employer] platform. Diabetes Education Handout has been sent to provided email address.

## 2024-01-23 NOTE — PROGRESS NOTES
History of Present Illness:   Patient states they are here for Dm exam  Denies NTB to feet  Is on blood thinner  History of blood clot in legs and PE  Has pain to right 2nd toe  Most recent A1C is 6.5    Past Medical History  Past Medical History:   Diagnosis Date    Allergic     Anxiety     Arthritis 09/01/2023    Clotting disorder (CMS/[facility])     Depression     Difficulty walking 2003    HL (hearing loss) 05/01/2023    Hypertension 2000?    Urinary tract infection 01/01/2015       Medications and Allergies have been reviewed.    Review Of Systems:  GENERAL: No weight loss, malaise or fevers.  HEENT: Negative for frequent or significant headaches,   RESPIRATORY: Negative for cough, wheezing or shortness of breath.  CARDIOVASCULAR: Negative for chest pain, leg swelling or palpitations.      Examination of Both Lower Extremities:   Objective:   Vasc: DP and PT pulses are palpable bilateral.  CFT is less than 3 seconds bilateral.  Skin temperature is warm to cool proximal to distal bilateral.  No hair growth noted    Neuro: Vibratory, light touch and proprioception are intact bilateral.      Derm: Nails 1-5 bilateral are intact , 2nd and 3rd nail thick.  Skin is supple with normal texture and turgor noted.  Webspaces are clean, dry and intact bilateral.  There are no hyperkeratoses, ulcerations, verruca or other lesions noted.      Ortho: Muscle strength is 5/5 for all pedal groups tested.   1. Pain in both feet        2. PVD (peripheral vascular disease) (CMS/[facility])        3. Onychomycosis              Patient educated on proper diabetic foot care.  Nails 1-5 b/l were debrided in thickness and length with nail cutting forceps.  Discussed r 2nd toe pain, discussed nail being filed down  Discussed leg compression   A1C revd.   Patient to follow up in 6 mos or sooner if any problems arise.   Patient was in agreement to this plan. All questions answered.      Chelle Melendez DPM  [phone]  Option 2  Fax:  [phone]

## 2024-01-31 ENCOUNTER — TELEMEDICINE CLINICAL SUPPORT (OUTPATIENT)
Dept: CARE COORDINATION | Facility: CLINIC | Age: 64
End: 2024-01-31
Payer: COMMERCIAL

## 2024-02-01 NOTE — PROGRESS NOTES
"Reason for Visit:  Rome Mullins is a 63 y.o. male who presents for Initial DSME Visit    DSME - Global Assessment    Referring Provider: Victoria Aguirre  Marital Status: .  Support Person: Name: Danii Mullins (spouse) .    Have you had diabetes education in the past?  No.    Readiness to Learn: demonstrates willingness to learn  Preferred learning method: \"hands on\"    Household Composition: living independently, with family    Demographics:   Difficulties with: financial and physical mobility    Race/Ethnic Origin: White/  Occupation:  Patient reports to have been on disability since 2003 due to significant back issues.    Health Status:  Smoking/Tobacco Use: No, patient does not smoke or use tobacco.  Alcohol Use: Yes, patient drinks alcohol. Amount: Patient admits to drinking about a 12-pack of beer/week but usually only in the summer months.    Type of Diabetes: Type 2  What year were you diagnosed with diabetes: 2023  Family History: Patient indicates that his father had diabetes as well - required insulin.    Comorbidities/Chronic Complications: obesity, hypertension, hyperlipidemia, sexual concerns, and recent cellulitis to left lower extremity.    Lab Results   Component Value Date    HGBA1C 6.5 (H) 01/06/2024    HGBA1C 6.3 (A) 04/20/2023    HGBA1C 6.0 (A) 04/01/2022    HGBA1C 6.7 (H) 12/13/2020    HGBA1C 6.4 (H) 05/15/2020    HGBA1C 6.5 (H) 01/28/2020    HGBA1C 6.1 11/20/2018     Lab Results   Component Value Date    CHOL 177 04/20/2023    CHOL 165 04/01/2022    CHOL 173 05/15/2020     Lab Results   Component Value Date    HDL 41.9 04/20/2023    HDL 33.0 (A) 04/01/2022    HDL 35 (L) 05/15/2020     Lab Results   Component Value Date    LDLCALC 115 05/15/2020     Lab Results   Component Value Date    TRIG 122 04/20/2023    TRIG 93 04/01/2022    TRIG 113 05/15/2020       Health Utilization Past 12 Months:   Hospital Admissions: Yes. Number of Visits: last visit noted on 1/4/23.  ER Visits: " "Yes. Number of Visits: last visit noted on 12/22/23.  Primary Care Visits: Yes. Number of Visits: last visit noted on 1/17/24.    Podiatry : Patient reports that he was seen by Podiatry as recently as last week.  Dentist : Patient does report that he has been seen by a Dentist within the last year due to needing to have a tooth pulled.    Diabetes Self-Management Skills and Behaviors:   Do you exercise regularly?: No.      Diabetes Medications: oral agents  Current Outpatient Medications   Medication Sig Dispense Refill    acetaminophen (Tylenol) 500 mg tablet Take 2 tablets (1,000 mg) by mouth every 8 hours if needed.      apixaban (Eliquis) 5 mg tablet Take 1 tablet (5 mg) by mouth 2 times a day. with or without food      gabapentin (Neurontin) 600 mg tablet Take 1 tablet (600 mg) by mouth 3 times a day.      lisinopril 10 mg tablet Take 1 tablet (10 mg) by mouth once daily.      metFORMIN (Glucophage) 500 mg tablet Take 1 tablet (500 mg) by mouth once daily with breakfast. 90 tablet 3    metoprolol tartrate (Lopressor) 25 mg tablet Take 1 tablet (25 mg) by mouth 2 times a day with meals.      polyethylene glycol (Glycolax, Miralax) 17 gram packet Take 17 g by mouth once daily as needed (constipation). 10 each 0    sertraline (Zoloft) 100 mg tablet Take 1 tablet (100 mg) by mouth once daily.      sildenafil (Viagra) 100 mg tablet Take 1 tablet (100 mg) by mouth once daily as needed.       No current facility-administered medications for this visit.     Monitorng: None      Hypoglycemia: Frequency: Patient indicates that this has only ever occurred about 2-3x ever, shakiness/dizziness, and feeling sick to stomach.  Hypoglycemia Treatment: \"I drank some pop or something\"      Diabetes Assessment:   My level of stress is high: agree.  DSME - Meal Planning and Diet Recall  Are you currently following any meal plan: No Plan.    Does your culture or Roman [employer] require any of the following:   Who does the grocery shopping? " patient and spouse  Who does the cooking in the home? patient    How often do you eat out? Patient reports currently eating about about 2x/week.  How many meals do you eat in per day: two.  Which meals do you tend to skip: lunch  What do you drink with and between meals: water, coffee, sugary drinks (regular gingerale), sweetened tea, or 2% milk.    Diet Recall:   Diet history was obtained. Patient admits to typically only eating 2-3 meals daily. Lunch meal is skipped about 3x/week. Breakfast meal is consistently either 4 slices of rye toast with butter or cereal (Wheaties or Special K). Patient admits to drinking one 8 oz. Cup of regular coffee daily which has 2 tsp. Regular sugar and flavored creamer. Patient will eat vegetables. Does not like diet beverages and thus will drink sweetened tea or regular gingerale. Patient also known to consume 8 oz of 2% milk or orange juice on occasion. Snack items consist of cookies, chips, Doritos or bowl of cereal.       Specific Goal:   NO GOAL ESTABLISHED YET.      DSME Topics Covered During Visit:   Review of education handout was not started yet.      DSME Topics for Follow-Up:   A1c Review  Understanding Diabetes Basics  Reducing Your Risk For Other Shashi Concerns  Reviewed Chronic Complications/Risks Related to Diabetes  Routine Health Assessment and Labs  Review Glycemic Goals (CGM or SMBG)  Reviewed Hypoglycemia Signs/Symptoms/Tx Plan  Reviewed Hyperglycemia Signs/Symptoms/Tx Plan  Ways to Deal With Diabetes Symptoms  Healthy Meal Plan  MyPlate Method      Materials provided during today's visit: Diabetes Education Handout     Provider Impression: Session was an agreed upon telehealth visit using the [employer] platform. Patient participated from his home, educator participated from her home. Assessment and diet history were obtained. Patient was engaged throughout duration of session. Review of education handout was not started given time constraints. As such, patient has  agreed to a follow up telehealth session, using the [employer] platform, on 2/5/24 at 1:00 PM, for this purpose. Goal will be established at that time.     Time: I personally spent a total of 80 minutes with the patient providing diabetes self-management education. Visit documentation will be sent electronically to referring provider.

## 2024-02-05 ENCOUNTER — TELEMEDICINE CLINICAL SUPPORT (OUTPATIENT)
Dept: CARE COORDINATION | Facility: CLINIC | Age: 64
End: 2024-02-05
Payer: COMMERCIAL

## 2024-02-06 NOTE — PROGRESS NOTES
Met with patient on 2/5/24 from 1:00 PM - 2:40 PM for follow up diabetes self management education. Session was an agreed upon telehealth visit using the [employer] platform. Patient participated from his home, educator participated from her home. Patient confirmed the continued use of the following diabetes medication regimen: Metformin 500 mg 1-tab daily. Patient indicated compliance in taking this medication, as ordered, and offered no significant, negative issues with its continued use. Review of Diabetes Education Handout was initiated. Patient was instructed on target HgbA1c and blood sugar ranges and how they correlate. Patient was instructed on causes of hyperglycemia and ways to treat. Patient was instructed on diet using the plate method with an emphasis on non-starchy vegetables and portion sizes. Full review of education handout was not completed given time constraints. As such, patient has agreed to a follow up telehealth visit, using the [employer] platform, on 2/12/24 at 1:00 PM, for this purpose. Goal will be established at that time. Patient voiced interest in the potential for acquiring a glucometer and thus was encouraged to consult with insurance company as to which would be covered under his plan. Patient voiced understanding and was in agreement to do so.

## 2024-02-12 ENCOUNTER — TELEMEDICINE CLINICAL SUPPORT (OUTPATIENT)
Dept: CARE COORDINATION | Facility: CLINIC | Age: 64
End: 2024-02-12
Payer: COMMERCIAL

## 2024-02-13 NOTE — PROGRESS NOTES
Met with patient on 2/12/24 from 1:00 PM - 2:50 PM for follow up diabetes self management education. Session was an agreed upon telehealth visit using the [employer] platform. Patient participated from his home, educator participated from her home. Patient confirmed the continued use of the following diabetes medication regimen: Metformin 500 mg 1-tab daily. Patient indicated compliance in taking this medication, as ordered, and offered no significant, negative issues with its continued use. Patient indicated that he had not yet discussed, with his wife, the possibility of obtaining a glucometer through his insurance, but was seriously considering doing so in order to keep tabs on the overall progress of his blood sugar control. Patient was instructed on how insulin works to lower blood sugars in the body. Patient was also instructed on diet using CHO counting. Patient was encouraged to eat 45-60 grams of CHO at meals, 15 grams of CHO at mid-morning and mid-afternoon, and 15-30 grams of CHO at bedtime, if desired. Patient voiced understanding and asked appropriate questions. No additional diabetes education follow-up, nor goal, was established as patient will not be active on educator caseload at this time. Patient was provided with educator contact information and was encouraged to reach out should questions arise or if additional one-on-one session is requested.

## 2024-03-21 ENCOUNTER — TELEPHONE (OUTPATIENT)
Dept: HEMATOLOGY/ONCOLOGY | Facility: HOSPITAL | Age: 64
End: 2024-03-21

## 2024-03-21 ENCOUNTER — LAB (OUTPATIENT)
Dept: LAB | Facility: LAB | Age: 64
End: 2024-03-21
Payer: COMMERCIAL

## 2024-03-21 DIAGNOSIS — E53.8 B12 DEFICIENCY: ICD-10-CM

## 2024-03-21 DIAGNOSIS — Z12.5 ENCOUNTER FOR PROSTATE CANCER SCREENING: ICD-10-CM

## 2024-03-21 DIAGNOSIS — I10 PRIMARY HYPERTENSION: ICD-10-CM

## 2024-03-21 DIAGNOSIS — E78.2 MIXED HYPERLIPIDEMIA: ICD-10-CM

## 2024-03-21 DIAGNOSIS — E11.9 TYPE 2 DIABETES MELLITUS WITHOUT COMPLICATION, WITHOUT LONG-TERM CURRENT USE OF INSULIN (MULTI): ICD-10-CM

## 2024-03-21 DIAGNOSIS — Z11.59 NEED FOR HEPATITIS C SCREENING TEST: ICD-10-CM

## 2024-03-21 DIAGNOSIS — Z01.89 ENCOUNTER FOR ROUTINE LABORATORY TESTING: ICD-10-CM

## 2024-03-21 LAB
ALBUMIN SERPL BCP-MCNC: 3.9 G/DL (ref 3.4–5)
ALP SERPL-CCNC: 73 U/L (ref 33–136)
ALT SERPL W P-5'-P-CCNC: 16 U/L (ref 10–52)
ANION GAP SERPL CALC-SCNC: 13 MMOL/L (ref 10–20)
AST SERPL W P-5'-P-CCNC: 21 U/L (ref 9–39)
BASOPHILS # BLD AUTO: 0.1 X10*3/UL (ref 0–0.1)
BASOPHILS NFR BLD AUTO: 0.9 %
BILIRUB SERPL-MCNC: 0.5 MG/DL (ref 0–1.2)
BUN SERPL-MCNC: 19 MG/DL (ref 6–23)
CALCIUM SERPL-MCNC: 9.7 MG/DL (ref 8.6–10.3)
CHLORIDE SERPL-SCNC: 102 MMOL/L (ref 98–107)
CHOLEST SERPL-MCNC: 194 MG/DL (ref 0–199)
CHOLESTEROL/HDL RATIO: 5
CO2 SERPL-SCNC: 24 MMOL/L (ref 21–32)
CREAT SERPL-MCNC: 0.84 MG/DL (ref 0.5–1.3)
CREAT UR-MCNC: 193.6 MG/DL (ref 20–370)
EGFRCR SERPLBLD CKD-EPI 2021: >90 ML/MIN/1.73M*2
EOSINOPHIL # BLD AUTO: 0.88 X10*3/UL (ref 0–0.7)
EOSINOPHIL NFR BLD AUTO: 8.3 %
ERYTHROCYTE [DISTWIDTH] IN BLOOD BY AUTOMATED COUNT: 13.9 % (ref 11.5–14.5)
EST. AVERAGE GLUCOSE BLD GHB EST-MCNC: 134 MG/DL
GLUCOSE SERPL-MCNC: 108 MG/DL (ref 74–99)
HBA1C MFR BLD: 6.3 %
HCT VFR BLD AUTO: 45.3 % (ref 41–52)
HCV AB SER QL: NONREACTIVE
HDLC SERPL-MCNC: 38.9 MG/DL
HGB BLD-MCNC: 14.7 G/DL (ref 13.5–17.5)
IMM GRANULOCYTES # BLD AUTO: 0.12 X10*3/UL (ref 0–0.7)
IMM GRANULOCYTES NFR BLD AUTO: 1.1 % (ref 0–0.9)
LDLC SERPL CALC-MCNC: 128 MG/DL
LYMPHOCYTES # BLD AUTO: 3.07 X10*3/UL (ref 1.2–4.8)
LYMPHOCYTES NFR BLD AUTO: 28.8 %
MCH RBC QN AUTO: 28.8 PG (ref 26–34)
MCHC RBC AUTO-ENTMCNC: 32.5 G/DL (ref 32–36)
MCV RBC AUTO: 89 FL (ref 80–100)
MICROALBUMIN UR-MCNC: 12.8 MG/L
MICROALBUMIN/CREAT UR: 6.6 UG/MG CREAT
MONOCYTES # BLD AUTO: 1 X10*3/UL (ref 0.1–1)
MONOCYTES NFR BLD AUTO: 9.4 %
NEUTROPHILS # BLD AUTO: 5.49 X10*3/UL (ref 1.2–7.7)
NEUTROPHILS NFR BLD AUTO: 51.5 %
NON HDL CHOLESTEROL: 155 MG/DL (ref 0–149)
NRBC BLD-RTO: 0 /100 WBCS (ref 0–0)
PLATELET # BLD AUTO: 407 X10*3/UL (ref 150–450)
POTASSIUM SERPL-SCNC: 4.1 MMOL/L (ref 3.5–5.3)
PROT SERPL-MCNC: 8.1 G/DL (ref 6.4–8.2)
PSA SERPL-MCNC: 2.48 NG/ML
RBC # BLD AUTO: 5.1 X10*6/UL (ref 4.5–5.9)
SODIUM SERPL-SCNC: 135 MMOL/L (ref 136–145)
TRIGL SERPL-MCNC: 136 MG/DL (ref 0–149)
VLDL: 27 MG/DL (ref 0–40)
WBC # BLD AUTO: 10.7 X10*3/UL (ref 4.4–11.3)

## 2024-03-21 PROCEDURE — 86803 HEPATITIS C AB TEST: CPT

## 2024-03-21 PROCEDURE — 83036 HEMOGLOBIN GLYCOSYLATED A1C: CPT

## 2024-03-21 PROCEDURE — 82043 UR ALBUMIN QUANTITATIVE: CPT

## 2024-03-21 PROCEDURE — 36415 COLL VENOUS BLD VENIPUNCTURE: CPT

## 2024-03-21 PROCEDURE — 86340 INTRINSIC FACTOR ANTIBODY: CPT

## 2024-03-21 PROCEDURE — 82570 ASSAY OF URINE CREATININE: CPT

## 2024-03-21 PROCEDURE — 83921 ORGANIC ACID SINGLE QUANT: CPT

## 2024-03-21 PROCEDURE — 86255 FLUORESCENT ANTIBODY SCREEN: CPT

## 2024-03-21 PROCEDURE — 84153 ASSAY OF PSA TOTAL: CPT

## 2024-03-21 NOTE — TELEPHONE ENCOUNTER
Attempted to reach out to patient regarding Dr. Snyder leaving and his need to establish care with a new provider. Left message for patient with the call back number to call back at his earliest convenience.

## 2024-03-23 LAB
IF BLOCK AB SER QL RIA: NEGATIVE
PCA IGG SER-ACNC: 2.4 UNITS (ref 0–24.9)

## 2024-03-25 NOTE — TELEPHONE ENCOUNTER
Patient called back regarding the message left for him. Patient is agreeable to transfer care to Dr. Finnegan. Scheduled patient for May 1st, 2024 @ 1:00 pm in [place]. Patient verbalized and agreed to appointment.

## 2024-03-27 LAB — METHYLMALONATE SERPL-SCNC: 0.25 UMOL/L (ref 0–0.4)

## 2024-03-28 ENCOUNTER — OFFICE VISIT (OUTPATIENT)
Dept: PULMONOLOGY | Facility: CLINIC | Age: 64
End: 2024-03-28
Payer: COMMERCIAL

## 2024-03-28 VITALS
WEIGHT: 288.4 LBS | OXYGEN SATURATION: 92 % | HEART RATE: 72 BPM | DIASTOLIC BLOOD PRESSURE: 66 MMHG | BODY MASS INDEX: 38.05 KG/M2 | SYSTOLIC BLOOD PRESSURE: 113 MMHG

## 2024-03-28 DIAGNOSIS — G47.34 SLEEP RELATED HYPOXIA: ICD-10-CM

## 2024-03-28 DIAGNOSIS — Z86.711 HISTORY OF PULMONARY EMBOLISM: Primary | ICD-10-CM

## 2024-03-28 DIAGNOSIS — G47.33 OBSTRUCTIVE SLEEP APNEA SYNDROME: ICD-10-CM

## 2024-03-28 PROCEDURE — 1036F TOBACCO NON-USER: CPT | Performed by: NURSE PRACTITIONER

## 2024-03-28 PROCEDURE — 3078F DIAST BP <80 MM HG: CPT | Performed by: NURSE PRACTITIONER

## 2024-03-28 PROCEDURE — 99215 OFFICE O/P EST HI 40 MIN: CPT | Performed by: NURSE PRACTITIONER

## 2024-03-28 PROCEDURE — 3074F SYST BP LT 130 MM HG: CPT | Performed by: NURSE PRACTITIONER

## 2024-03-28 NOTE — PATIENT INSTRUCTIONS
Mr. Mullins it was a pleasure seeing you in the office today. We discussed the following:      - Great job with your CPAP, you are off to a good start   - Continue the oxygen at 2 L with your CPAP     - Please continue your Eliquis   - Please continue to see hematology/oncology   - Please make an appointment with Cardiology for your history of afib       Follow-up in 6 months

## 2024-03-28 NOTE — PROGRESS NOTES
Subjective   Patient ID: Rome Mullins is a 63 y.o. male who presents for Follow-up ([facility] cpap [facility]).  HPI  HPI: He is here today to establish care. Patient had COVID and was hospitalized for 1 month at [facility]. He was sent home on 4 L of oxygen which she still uses with sleep. He complains of an ongoing cough since having COVID. Cough is wet sounding but is mostly dry. He also complains of fatigue. He did have a sleep study many years ago had sleep apnea did use a CPAP but discontinued because of claustrophobia with the large mask. Patient is very willing to have a new sleep study and try CPAP again with a small nasal mask. He also complains of some postnasal drip which could be contributing to his cough I will send in Flonase and Singulair. I would also like him to have a post-COVID chest CT since patient still complains of some symptoms.    08/24/2022 he is here today for follow-up. He had a chest CT which shows improvement from the previous chest CT which showed pneumatic consolidations with groundglass opacities. He does have several subcentimeter nodules could be infectious or inflammatory. I will repeat a CT in 1 year. He complains of some shortness of breath and coughing. I would like to order him a PFT to evaluate for further lung function. He did have a sleep study sleep study shows a AHI of 32.5 SPO2 reza 74%. Oxygen was not used during the study he did spend 12.8 minutes below 88%. He does use 2 L of oxygen at home this was started in the hospital. I would actually like him to return for a titration study so that we can evaluate for CPAP and is well for oxygen and then if it is not needed we can discontinue it. He would like a nasal mask as he states he has claustrophobia.    10/12/22 he is here today for follow-up. He had his sleep study back in July and unfortunately insurance denied a titration study. In lieu I will order him an auto CPAP and I will have to do a nocturnal pulse oximetry study on  his CPAP since patient uses 2 L of oxygen with sleep at home. Patient and I discussed this and he is very agreeable to it. He has claustrophobia so I am going to order him a very small mask. He also had a PFT which shows an FEV1 of 73% he had some restriction in his airways. Patient's biggest complaint today was fatigue which should improve significantly once he starts CPAP. We went over CPAP today I showed him what they look like we discussed care and use. Patient is very willing to try CPAP.    12/14/22 he is here today for follow-up with his new CPAP. He is doing well he is used it 42 out of the last 45 nights his average is 4.6 hours and his AHI is 0.4. He only sleeps around 4 hours a night. We had a talk today about trying to sleep longer. He agrees and he states that part of his problem is he stays up late on electronics. We really discussed putting those away. He is also complaining of coughing with congestion and sinus pain and pressure. He says this is been going on about a week. I will go ahead and treat this for him today. His oxygen was started at 2 L on his CPAP.    03/15/23 he is here today for follow-up from hospitalization. He tells me he got sick he was sick for about a month and ended up with chest pain and sudden onset shortness of breath he then went to [facility] ER was found to have a PE. He also had pneumonia. He was admitted for a week he is now back home and is returning to baseline. He is on Eliquis. This is the second time he has had a PE. I would like him to see hematology/oncology for follow-up. He will need a chest CT for PE in 6 months. Patient does well with his BiPAP with 2 L of oxygen. 10 minutes spent preparing patient's chart. 35 minutes spent with patient answering questions and determining care. 10 minutes spent charting and ordering tests and medications    09/27/23 he is here today for follow-up. He had a chest CT for PE which shows resolution of his PE. He is seeing hematology  and they have recommended that he remain on anticoagulation for life. He does have a history of A-fib dating back about 10 years ago. He tells me at that time they told him he did not need to be on anticoagulation. He has not seen a cardiologist since. I am going to give him a referral today. He uses his CPAP every night for 6.2 hours AHI 0.7. He does use 2 L with his CPAP. Patient is also looking for a general surgeon for hemorrhoids and a colonoscopy. I will refer him to Dr. Tyler. 5 minutes spent preparing patient's chart. 30 minutes spent with patient answering questions and determining care. 10 minutes spent charting and ordering tests and medications     03/28/24 He is here today for follow up. He has been doing well. He continues to use his cpap. He uses his 2L with his cpap. He continues on eliquis and see's hematology.      Previous pulmonary history:   He has no history of recurrent infections, or lung disease as a child. He had no previous lung hx, never on oxygen or inhaler therapy. He was previously told he has . He currently is on no supplemental oxygen. He has never been to pulmonary rehab. Does not recall having AECOPD requiring antibiotics or prednisone.     Inhalers/nebulized medications:      Hospitalization History:  He has not been hospitalized over the last year for breathing related problem.     Sleep history:  Admits to feeling tired during the day.  Complains of snoring, ? Apneas. Feels tired during the day. D  STOP-BANG score of 6     Comorbidities:  Fibromyalgia  Post laminectomy    SH:  smoking: Very minimal smoking history  drinking: none  illicit drug use: none     Occupation: (Full questionnaire on exposures obtained, discussed with the patient and scanned to EMR)  worked as   No known exposure to asbestos, silica or beryllium     Family History:  No family history of lung diseases or cancer     Imaging history: (I have personally reviewed the imaging below)     PFTs:  // ->  FEV1/FVC ratio/FEV1 (no BD response)/FVC/DLCO /TLC/RV to TLC ratio     6 MWTs: None on record         Review of Systems   All other systems reviewed and are negative.      Objective   Physical Exam  Vitals and nursing note reviewed.   Constitutional:       Appearance: Normal appearance. He is obese.   HENT:      Head: Normocephalic.      Nose: Nose normal.   Eyes:      Pupils: Pupils are equal, round, and reactive to light.   Cardiovascular:      Rate and Rhythm: Normal rate.   Pulmonary:      Effort: Pulmonary effort is normal.      Breath sounds: Normal breath sounds.   Neurological:      General: No focal deficit present.      Mental Status: He is alert and oriented to person, place, and time. Mental status is at baseline.   Psychiatric:         Mood and Affect: Mood normal.         Behavior: Behavior normal.         Thought Content: Thought content normal.         Judgment: Judgment normal.         Assessment/Plan        # Post COVID   - He had COVID and was hospitalized for 1 month at [facility]   - He was discharged on oxygen   - He still has an ongoing cough  -Order a chest CT for post-COVID syndrome -shows resolution of the airspace consolidations from previous chest CT but also shows several subcentimeter pulmonary nodules   - I will order a PFT for some sob    - PFT showed restriction in his airways    # Chronic respiratory failure   - He is on 4L since being hospitalized with COVID   - No longer uses oxygen during the day but still uses 2 L with sleep  09/27/23 he continues with 2 L of oxygen with his CPAP    # Sleep disturbance with snoring:   - Patient needs an in lab study since he currently uses 4 L of oxygen  -He was diagnosed with sleep apnea many years ago he did have a CPAP he discontinued it because of claustrophobia  -STOP-BANG score of 6  -High risk for VICENTE  -Split sleep study ordered.    # PE   - Hospitalized last week at [facility] for a PE   -This is the second time patient has had a PE   -He is  "now on Eliquis   -He will need a chest CT for PE in 6 months   -I am going to give him information for hematology/oncology  09/27/23 he continues to see hematology and will be on lifelong anticoagulation    # Pneumonia   - He was hospitalized with pneumonia last week   -Today with IV antibiotics   - Feeling much better    # VICENTE:   -He had a sleep study on 07/12/22   - His AHI was 32.5 his SPO2 reza was 74%. He did not meet criteria for a split-night study.   - Because he uses oxygen with sleep I would like him to return to the sleep lab for a CPAP titration study. Patient also is concerned about finding the correct mask because he has claustrophobia.  -counseled to avoid supine/ sleeping. Can buy \"[employer]\" online to help.  -discussed avoiding compliance measures, avoiding sedatives and alcohol and caution driving and operating machinery  10/12/22 patient was denied a titration study through her insurance. I will order him an auto CPAP 5 to 20 cm H2O pressure with a small nasal mask. He has claustrophobia. He will also need a nocturnal pulse oximetry study on CPAP since patient has 2 L of oxygen he currently uses with sleep.  12/14/22 he is using his CPAP 42 out of 45 nights for 4.6 hours AHI 0.4. He did qualify for 2 L of oxygen with sleep  -Obesity: discussed the importance of weight loss    09/27/23 he uses his CPAP every night for 6.2 hours AHI 0.7  03/28/24 cpap  is set from 5-12, 95th pressure is 9 average use is 5.5 hours and Ahi is 0.6, he gets supplies regularly.     #PND   - I am sending in a nasal spray and some Singulair for you to use to help with your postnasal drip   - He still has postnasal drip we will discontinue the Singulair and try levocetirizine at bedtime and then I encouraged him to use the Flonase morning and night and to use it regularly since he did feel that it helps but he is just not using it consistently.    # Obesity/deconditioning:  -counseled on the importance of diet, exercise " and weight loss     Mr. Mullins it was a pleasure seeing you in the office today. We discussed the following:      - Great job with your CPAP, you are off to a good start   - Continue the oxygen at 2 L with your CPAP     - Please continue your Eliquis   - Please continue to see hematology/oncology   - Please make an appointment with Cardiology for your history of afib       Follow-up in 6 months          KURT Gongora-PATRICK 03/28/24 4:07 PM

## 2024-04-08 ENCOUNTER — APPOINTMENT (OUTPATIENT)
Dept: PRIMARY CARE | Facility: CLINIC | Age: 64
End: 2024-04-08
Payer: COMMERCIAL

## 2024-04-13 ASSESSMENT — PROMIS GLOBAL HEALTH SCALE
RATE_MENTAL_HEALTH: FAIR
RATE_SOCIAL_SATISFACTION: FAIR
CARRYOUT_PHYSICAL_ACTIVITIES: A LITTLE
RATE_AVERAGE_FATIGUE: MODERATE
RATE_PHYSICAL_HEALTH: FAIR
EMOTIONAL_PROBLEMS: OFTEN
RATE_AVERAGE_PAIN: 7
RATE_QUALITY_OF_LIFE: FAIR
RATE_GENERAL_HEALTH: FAIR
CARRYOUT_SOCIAL_ACTIVITIES: FAIR

## 2024-04-15 ENCOUNTER — OFFICE VISIT (OUTPATIENT)
Dept: PRIMARY CARE | Facility: CLINIC | Age: 64
End: 2024-04-15
Payer: COMMERCIAL

## 2024-04-15 VITALS
DIASTOLIC BLOOD PRESSURE: 74 MMHG | BODY MASS INDEX: 38.43 KG/M2 | HEART RATE: 65 BPM | SYSTOLIC BLOOD PRESSURE: 136 MMHG | OXYGEN SATURATION: 97 % | WEIGHT: 290 LBS | TEMPERATURE: 98.2 F | HEIGHT: 73 IN

## 2024-04-15 DIAGNOSIS — I48.0 PAROXYSMAL ATRIAL FIBRILLATION (MULTI): ICD-10-CM

## 2024-04-15 DIAGNOSIS — I10 PRIMARY HYPERTENSION: ICD-10-CM

## 2024-04-15 DIAGNOSIS — N52.9 VASCULOGENIC ERECTILE DYSFUNCTION, UNSPECIFIED VASCULOGENIC ERECTILE DYSFUNCTION TYPE: ICD-10-CM

## 2024-04-15 DIAGNOSIS — Z00.00 ANNUAL PHYSICAL EXAM: Primary | ICD-10-CM

## 2024-04-15 DIAGNOSIS — E78.2 MIXED HYPERLIPIDEMIA: ICD-10-CM

## 2024-04-15 DIAGNOSIS — E11.9 TYPE 2 DIABETES MELLITUS WITHOUT COMPLICATION, WITHOUT LONG-TERM CURRENT USE OF INSULIN (MULTI): ICD-10-CM

## 2024-04-15 DIAGNOSIS — F41.8 MIXED ANXIETY AND DEPRESSIVE DISORDER: ICD-10-CM

## 2024-04-15 PROCEDURE — 3078F DIAST BP <80 MM HG: CPT | Performed by: NURSE PRACTITIONER

## 2024-04-15 PROCEDURE — 99396 PREV VISIT EST AGE 40-64: CPT | Performed by: NURSE PRACTITIONER

## 2024-04-15 PROCEDURE — 1036F TOBACCO NON-USER: CPT | Performed by: NURSE PRACTITIONER

## 2024-04-15 PROCEDURE — 3049F LDL-C 100-129 MG/DL: CPT | Performed by: NURSE PRACTITIONER

## 2024-04-15 PROCEDURE — 3044F HG A1C LEVEL LT 7.0%: CPT | Performed by: NURSE PRACTITIONER

## 2024-04-15 PROCEDURE — 4010F ACE/ARB THERAPY RXD/TAKEN: CPT | Performed by: NURSE PRACTITIONER

## 2024-04-15 PROCEDURE — 3061F NEG MICROALBUMINURIA REV: CPT | Performed by: NURSE PRACTITIONER

## 2024-04-15 PROCEDURE — 3075F SYST BP GE 130 - 139MM HG: CPT | Performed by: NURSE PRACTITIONER

## 2024-04-15 RX ORDER — ATORVASTATIN CALCIUM 10 MG/1
10 TABLET, FILM COATED ORAL DAILY
Qty: 90 TABLET | Refills: 3 | Status: SHIPPED | OUTPATIENT
Start: 2024-04-15

## 2024-04-15 ASSESSMENT — LIFESTYLE VARIABLES
AUDIT TOTAL SCORE: 2
HOW OFTEN DURING THE LAST YEAR HAVE YOU BEEN UNABLE TO REMEMBER WHAT HAPPENED THE NIGHT BEFORE BECAUSE YOU HAD BEEN DRINKING: NEVER
AUDIT-C TOTAL SCORE: 2
HOW OFTEN DURING THE LAST YEAR HAVE YOU NEEDED AN ALCOHOLIC DRINK FIRST THING IN THE MORNING TO GET YOURSELF GOING AFTER A NIGHT OF HEAVY DRINKING: NEVER
HOW OFTEN DURING THE LAST YEAR HAVE YOU FOUND THAT YOU WERE NOT ABLE TO STOP DRINKING ONCE YOU HAD STARTED: NEVER
SKIP TO QUESTIONS 9-10: 1
HAVE YOU OR SOMEONE ELSE BEEN INJURED AS A RESULT OF YOUR DRINKING: NO
HOW MANY STANDARD DRINKS CONTAINING ALCOHOL DO YOU HAVE ON A TYPICAL DAY: 1 OR 2
HOW OFTEN DO YOU HAVE A DRINK CONTAINING ALCOHOL: 2-4 TIMES A MONTH
HOW OFTEN DURING THE LAST YEAR HAVE YOU HAD A FEELING OF GUILT OR REMORSE AFTER DRINKING: NEVER
HOW OFTEN DO YOU HAVE SIX OR MORE DRINKS ON ONE OCCASION: NEVER
HOW OFTEN DURING THE LAST YEAR HAVE YOU FAILED TO DO WHAT WAS NORMALLY EXPECTED FROM YOU BECAUSE OF DRINKING: NEVER
HAS A RELATIVE, FRIEND, DOCTOR, OR ANOTHER HEALTH PROFESSIONAL EXPRESSED CONCERN ABOUT YOUR DRINKING OR SUGGESTED YOU CUT DOWN: NO

## 2024-04-15 ASSESSMENT — ENCOUNTER SYMPTOMS
SHORTNESS OF BREATH: 0
FATIGUE: 0
FEVER: 0
BACK PAIN: 0
BRUISES/BLEEDS EASILY: 0
NECK PAIN: 0
CHILLS: 0
POLYDIPSIA: 0
NAUSEA: 0
ADENOPATHY: 0
CHEST TIGHTNESS: 0
PALPITATIONS: 0
HEMATURIA: 0
FACIAL ASYMMETRY: 0
LOSS OF SENSATION IN FEET: 0
ABDOMINAL PAIN: 0
OCCASIONAL FEELINGS OF UNSTEADINESS: 0
BLOOD IN STOOL: 0
SPEECH DIFFICULTY: 0
WOUND: 0
DIAPHORESIS: 0
CONFUSION: 0
SEIZURES: 0
DYSURIA: 0
FLANK PAIN: 0
AGITATION: 0
DEPRESSION: 0
HEADACHES: 0
COUGH: 1
DIZZINESS: 0
VOMITING: 0
POLYPHAGIA: 0

## 2024-04-15 ASSESSMENT — PATIENT HEALTH QUESTIONNAIRE - PHQ9
1. LITTLE INTEREST OR PLEASURE IN DOING THINGS: NOT AT ALL
2. FEELING DOWN, DEPRESSED OR HOPELESS: NOT AT ALL
SUM OF ALL RESPONSES TO PHQ9 QUESTIONS 1 AND 2: 0

## 2024-04-15 ASSESSMENT — PAIN SCALES - GENERAL: PAINLEVEL: 6

## 2024-04-15 NOTE — PROGRESS NOTES
[facility]: [facility]  PHYSICAL EXAM      Rome Mullins is a 63 y.o. male that is presenting today for CPE. Patient c/o right sided chest discomfort from recent lower respiratory infection/cough.  He has not tried any treatments such as OTC medications or application of heat.    Assessment/Plan   Diagnoses and all orders for this visit:    Annual physical exam    Primary hypertension        -     acceptable control        -     Continue Lisinopril 10 mg daily        -     Encouraged to increase lifestyle modification efforts to include low sodium diet, weight management and regularly engage in aerobic activity.    Mixed hyperlipidemia  -     Good control. ASCVD Risk is high at 25.1%. Patient is diabetic and not taking statin. Will initiate statin to reduce risk.  -     Start atorvastatin (Lipitor) 10 mg tablet; Take 1 tablet (10 mg) by mouth once daily.    Mixed anxiety and depressive disorder        -     Stable on SSRI        -     Continue Sertraline 100 mg daily    Paroxysmal atrial fibrillation (Multi)        -     Continue Eliquis 5 mg twice daily    Type 2 diabetes mellitus without complication, without long-term current use of insulin (Multi)        -     Excellent control        -     Continue Metformin 500 mg daily with breakfast    Vasculogenic erectile dysfunction, unspecified vasculogenic erectile dysfunction type        -     PDE5 inhibitor effective        -     Continue Sildenafil 100 mg daily    Other orders  -     Follow Up In Primary Care - Health Maintenance; Future  -     Follow Up In Primary Care - Established; Future    Subjective   Subjective  Rome Mullins is here for follow-up of his hypertension. Home blood pressure readings: not doing. Salt intake and diet: salt shaker on table.  Associated signs and symptoms: none. Patient denies: blurred vision, chest pain, dyspnea, headache, neck aches, orthopnea, and palpitations. Use of agents associated with  hypertension: none. Medication compliance: taking as prescribed.    Objective  [unfilled]    Lab Review   Lab on 03/21/2024  Methylmalonic Acid, S     Value: 0.25(umol/L)       Dt: 03/21/2024  Intrinsic Factor Blockin* Value: Negative           Dt: 03/21/2024  Parietal Cell Ab          Value: 2.4(Units)         Dt: 03/21/2024  WBC                       Value: 10.7(x10*3/uL)     Dt: 03/21/2024  nRBC                      Value: 0.0(/100 WBCs)     Dt: 03/21/2024  RBC                       Value: 5.10(x10*6/uL)     Dt: 03/21/2024  Hemoglobin                Value: 14.7(g/dL)         Dt: 03/21/2024  Hematocrit                Value: 45.3(%)            Dt: 03/21/2024  MCV                       Value: 89(fL)             Dt: 03/21/2024  MCH                       Value: 28.8(pg)           Dt: 03/21/2024  MCHC                      Value: 32.5(g/dL)         Dt: 03/21/2024  RDW                       Value: 13.9(%)            Dt: 03/21/2024  Platelets                 Value: 407(x10*3/uL)      Dt: 03/21/2024  Neutrophils %             Value: 51.5(%)            Dt: 03/21/2024  Immature Granulocytes %,* Value: 1.1(%) (H)         Dt: 03/21/2024  Lymphocytes %             Value: 28.8(%)            Dt: 03/21/2024  Monocytes %               Value: 9.4(%)             Dt: 03/21/2024  Eosinophils %             Value: 8.3(%)             Dt: 03/21/2024  Basophils %               Value: 0.9(%)             Dt: 03/21/2024  Neutrophils Absolute      Value: 5.49(x10*3/uL)     Dt: 03/21/2024  Immature Granulocytes Ab* Value: 0.12(x10*3/uL)     Dt: 03/21/2024  Lymphocytes Absolute      Value: 3.07(x10*3/uL)     Dt: 03/21/2024  Monocytes Absolute        Value: 1.00(x10*3/uL)     Dt: 03/21/2024  Eosinophils Absolute      Value: 0.88(x10*3/uL) (H) Dt: 03/21/2024  Basophils Absolute        Value: 0.10(x10*3/uL)     Dt: 03/21/2024  Glucose                   Value: 108(mg/dL) (H)     Dt: 03/21/2024  Sodium                    Value: 135(mmol/L) (L)     Dt: 03/21/2024  Potassium                 Value: 4.1(mmol/L)        Dt: 03/21/2024  Chloride                  Value: 102(mmol/L)        Dt: 03/21/2024  Bicarbonate               Value: 24(mmol/L)         Dt: 03/21/2024  Anion Gap                 Value: 13(mmol/L)         Dt: 03/21/2024  Urea Nitrogen             Value: 19(mg/dL)          Dt: 03/21/2024  Creatinine                Value: 0.84(mg/dL)        Dt: 03/21/2024  eGFR                      Value: >90(mL/min/1.73m*2) Dt: 03/21/2024  Calcium                   Value: 9.7(mg/dL)         Dt: 03/21/2024  Albumin                   Value: 3.9(g/dL)          Dt: 03/21/2024  Alkaline Phosphatase      Value: 73(U/L)            Dt: 03/21/2024  Total Protein             Value: 8.1(g/dL)          Dt: 03/21/2024  AST                       Value: 21(U/L)            Dt: 03/21/2024  Bilirubin, Total          Value: 0.5(mg/dL)         Dt: 03/21/2024  ALT                       Value: 16(U/L)            Dt: 03/21/2024  Cholesterol               Value: 194(mg/dL)         Dt: 03/21/2024  HDL-Cholesterol           Value: 38.9(mg/dL)        Dt: 03/21/2024  Cholesterol/HDL Ratio     Value: 5.0                Dt: 03/21/2024  LDL Calculated            Value: 128(mg/dL) (H)     Dt: 03/21/2024  VLDL                      Value: 27(mg/dL)          Dt: 03/21/2024  Triglycerides             Value: 136(mg/dL)         Dt: 03/21/2024  Non HDL Cholesterol       Value: 155(mg/dL) (H)     Dt: 03/21/2024  Hemoglobin A1C            Value: 6.3(%) (H)         Dt: 03/21/2024  Estimated Average Glucose Value: 134(mg/dL)         Dt: 03/21/2024  Prostate Specific AG      Value: 2.48(ng/mL)        Dt: 03/21/2024  Albumin, Urine Random     Value: 12.8(mg/L)         Dt: 03/21/2024  Creatinine, Urine Random  Value: 193.6(mg/dL)       Dt: 03/21/2024  Albumin/Creatine Ratio    Value: 6.6(ug/mg Creat)   Dt: 03/21/2024  Hepatitis C AB            Value: Nonreactive        Dt:  03/21/2024  ------------    Assessment/Plan  Hypertension, normal blood pressure. Evidence of target organ damage: none.    Medication: no change.  Dietary sodium restriction.  Regular aerobic exercise.  Check blood pressures weekly and record.  Follow up: 1 year and as needed.    Josette Mullins is an 63 y.o. male who presents for follow up of diabetes. Current symptoms include: none. Patient denies foot ulcerations, hyperglycemia, hypoglycemia , increased appetite, nausea, polydipsia, polyuria, visual disturbances, and vomiting. Evaluation to date has included: fasting blood sugar, fasting lipid panel, hemoglobin A1C, and microalbuminuria. Home sugars: patient does not check sugars. Current treatments: no recent interventions.    [unfilled]     Objective  [unfilled]     Laboratory:  Lab Results       Component                Value               Date                       HGBA1C                   6.3 (H)             03/21/2024              Assessment/Plan  Diabetes mellitus Type II, under good control.  Addressed ADA diet.  Encouraged aerobic exercise.  Discussed foot care.  Reminded to get yearly retinal exam.  Continued metformin; see  medication orders.    Josette Mullins is a 63 y.o. male who presents for follow up of anxiety disorder. Current symptoms: none. He denies current suicidal and homicidal ideation. He complains of the following side effects from the treatment: none.     Objective  There were no vitals taken for this visit.   General:    alert and oriented, in no acute distress  Affect/Behavior:     full facial expressions, good grooming, good insight, normal perception, normal reasoning, normal speech pattern and content, and normal thought patterns     Assessment/Plan  Anxiety Disorder - unchanged, stable  Medications: Zoloft.  Instructed patient to contact office or on-call physician promptly should condition worsen or any new symptoms appear and provided on-call telephone  numbers. IF THE PATIENT HAS ANY SUICIDAL OR HOMICIDAL IDEATIONS, CALL THE OFFICE, DISCUSS WITH A SUPPORT MEMBER, OR GO TO THE ER IMMEDIATELY. Patient was agreeable with this plan.  Follow up: 1 year.            Review of Systems   Constitutional:  Negative for chills, diaphoresis, fatigue and fever.   HENT:  Negative for hearing loss and mouth sores.    Eyes:  Negative for visual disturbance.   Respiratory:  Positive for cough. Negative for chest tightness and shortness of breath.    Cardiovascular:  Negative for chest pain, palpitations and leg swelling.   Gastrointestinal:  Negative for abdominal pain, blood in stool, nausea and vomiting.   Endocrine: Negative for cold intolerance, heat intolerance, polydipsia, polyphagia and polyuria.   Genitourinary:  Negative for dysuria, flank pain and hematuria.   Musculoskeletal:  Negative for back pain and neck pain.        + Right sided muscular CP   Skin:  Negative for rash and wound.   Allergic/Immunologic: Negative for environmental allergies, food allergies and immunocompromised state.   Neurological:  Negative for dizziness, seizures, syncope, facial asymmetry, speech difficulty and headaches.   Hematological:  Negative for adenopathy. Does not bruise/bleed easily.   Psychiatric/Behavioral:  Negative for agitation and confusion.       Objective   Vitals:    04/15/24 1444   BP: 136/74   Pulse: 65   Temp: 36.8 °C (98.2 °F)   SpO2: 97%     Body mass index is 38.26 kg/m².  Physical Exam  Vitals and nursing note reviewed.   Constitutional:       General: He is not in acute distress.     Appearance: Normal appearance. He is not ill-appearing.   HENT:      Head: Normocephalic and atraumatic.      Right Ear: Tympanic membrane, ear canal and external ear normal. There is no impacted cerumen.      Left Ear: Tympanic membrane, ear canal and external ear normal. There is no impacted cerumen.      Nose: Nose normal.      Mouth/Throat:      Mouth: Mucous membranes are moist.       Pharynx: Oropharynx is clear. No oropharyngeal exudate or posterior oropharyngeal erythema.   Eyes:      General: No scleral icterus.        Right eye: No discharge.         Left eye: No discharge.      Extraocular Movements: Extraocular movements intact.      Conjunctiva/sclera: Conjunctivae normal.      Pupils: Pupils are equal, round, and reactive to light.   Neck:      Vascular: No carotid bruit.   Cardiovascular:      Rate and Rhythm: Normal rate and regular rhythm.      Pulses: Normal pulses.      Heart sounds: Normal heart sounds. No murmur heard.  Pulmonary:      Effort: Pulmonary effort is normal. No respiratory distress.      Breath sounds: Normal breath sounds.   Abdominal:      General: Abdomen is flat. Bowel sounds are normal. There is no distension.      Palpations: Abdomen is soft. There is no mass.      Tenderness: There is no abdominal tenderness. There is no right CVA tenderness or left CVA tenderness.      Hernia: No hernia is present.   Musculoskeletal:         General: Tenderness (lower right lateral chest, muscular tenderness to palpation) present. Normal range of motion.      Cervical back: Normal range of motion. No tenderness.      Right lower leg: No edema.      Left lower leg: No edema.   Lymphadenopathy:      Cervical: No cervical adenopathy.   Skin:     General: Skin is warm and dry.      Coloration: Skin is not jaundiced.      Findings: No rash.   Neurological:      General: No focal deficit present.      Mental Status: He is alert and oriented to person, place, and time. Mental status is at baseline.   Psychiatric:         Mood and Affect: Mood normal.         Behavior: Behavior normal.       Diagnostic Results   Lab Results   Component Value Date    GLUCOSE 108 (H) 03/21/2024    CALCIUM 9.7 03/21/2024     (L) 03/21/2024    K 4.1 03/21/2024    CO2 24 03/21/2024     03/21/2024    BUN 19 03/21/2024    CREATININE 0.84 03/21/2024     Lab Results   Component Value Date    ALT 16  "03/21/2024    AST 21 03/21/2024    ALKPHOS 73 03/21/2024    BILITOT 0.5 03/21/2024     Lab Results   Component Value Date    WBC 10.7 03/21/2024    HGB 14.7 03/21/2024    HCT 45.3 03/21/2024    MCV 89 03/21/2024     03/21/2024     Lab Results   Component Value Date    CHOL 194 03/21/2024    CHOL 177 04/20/2023    CHOL 165 04/01/2022     Lab Results   Component Value Date    HDL 38.9 03/21/2024    HDL 41.9 04/20/2023    HDL 33.0 (A) 04/01/2022     Lab Results   Component Value Date    LDLCALC 128 (H) 03/21/2024    LDLCALC 115 05/15/2020     Lab Results   Component Value Date    TRIG 136 03/21/2024    TRIG 122 04/20/2023    TRIG 93 04/01/2022     No components found for: \"CHOLHDL\"  Lab Results   Component Value Date    HGBA1C 6.3 (H) 03/21/2024     Other labs not included in the list above were reviewed either before or during this encounter.    History   Past Medical History:   Diagnosis Date    Allergic     Anxiety     Arthritis 09/01/2023    Clotting disorder (Multi)     Depression     Difficulty walking 2003    HL (hearing loss) 05/01/2023    Hypertension 2000?    Urinary tract infection 01/01/2015     Past Surgical History:   Procedure Laterality Date    BACK SURGERY  01/01/2002    CHOLECYSTECTOMY  01/01/2008    HERNIA REPAIR  01/01/2011    JOINT REPLACEMENT  01/01/2010    SPINE SURGERY  01/01/2002    TOENAIL EXCISION  2019    TONSILLECTOMY  01/01/1971    WISDOM TOOTH EXTRACTION  01/01/1998     Family History   Problem Relation Name Age of Onset    Mental illness Mother Chey     Hypertension Mother Chey     Diabetes Mother Chey     Peripheral vascular disease Father Sd     Diabetes Father Sd     Hypertension Father Sd     Cancer Father Sd     No Known Problems Sister      No Known Problems Daughter      No Known Problems Son      Diabetes Other fam hx     Heart disease Other fam hx     Stroke Other fam hx     Hypertension Other fam hx     Cancer Other fam hx      Social History     Socioeconomic " History    Marital status:      Spouse name: Not on file    Number of children: Not on file    Years of education: Not on file    Highest education level: Not on file   Occupational History    Not on file   Tobacco Use    Smoking status: Former     Current packs/day: 0.00     Average packs/day: 1 pack/day for 14.7 years (14.7 ttl pk-yrs)     Types: Cigarettes, Cigars     Start date: 1974     Quit date: 1989     Years since quittin.2     Passive exposure: Never    Smokeless tobacco: Former   Substance and Sexual Activity    Alcohol use: Yes     Alcohol/week: 1.0 standard drink of alcohol     Types: 1 Cans of beer per week     Comment: Sometimes 3    Drug use: Never    Sexual activity: Not Currently     Partners: Female   Other Topics Concern    Not on file   Social History Narrative    Not on file     Social Determinants of Health     Financial Resource Strain: Medium Risk (2024)    Overall Financial Resource Strain (CARDIA)     Difficulty of Paying Living Expenses: Somewhat hard   Food Insecurity: Not on file   Transportation Needs: No Transportation Needs (2024)    PRAPARE - Transportation     Lack of Transportation (Medical): No     Lack of Transportation (Non-Medical): No   Physical Activity: Not on file   Stress: Not on file   Social Connections: Unknown (2024)    Social Connection and Isolation Panel [NHANES]     Frequency of Communication with Friends and Family: Not on file     Frequency of Social Gatherings with Friends and Family: Not on file     Attends Taoism Services: Not on file     Active Member of Clubs or Organizations: Not on file     Attends Club or Organization Meetings: Not on file     Marital Status:    Intimate Partner Violence: Not At Risk (2024)    Humiliation, Afraid, Rape, and Kick questionnaire     Fear of Current or Ex-Partner: No     Emotionally Abused: No     Physically Abused: No     Sexually Abused: No   Housing Stability: Low Risk   (1/4/2024)    Housing Stability Vital Sign     Unable to Pay for Housing in the Last Year: No     Number of Places Lived in the Last Year: 1     Unstable Housing in the Last Year: No     Allergies   Allergen Reactions    Fentanyl Palpitations and Unknown     rapid heart rate    Duloxetine GI Upset    Venlafaxine GI Upset    Methadone Palpitations     Current Outpatient Medications on File Prior to Visit   Medication Sig Dispense Refill    acetaminophen (Tylenol) 500 mg tablet Take 2 tablets (1,000 mg) by mouth every 8 hours if needed.      apixaban (Eliquis) 5 mg tablet Take 1 tablet (5 mg) by mouth 2 times a day. with or without food      gabapentin (Neurontin) 600 mg tablet Take 1 tablet (600 mg) by mouth 3 times a day.      lisinopril 10 mg tablet Take 1 tablet (10 mg) by mouth once daily.      metFORMIN (Glucophage) 500 mg tablet Take 1 tablet (500 mg) by mouth once daily with breakfast. 90 tablet 3    metoprolol tartrate (Lopressor) 25 mg tablet Take 1 tablet (25 mg) by mouth 2 times a day with meals.      sertraline (Zoloft) 100 mg tablet Take 1 tablet (100 mg) by mouth once daily.      sildenafil (Viagra) 100 mg tablet Take 1 tablet (100 mg) by mouth once daily as needed.      [DISCONTINUED] polyethylene glycol (Glycolax, Miralax) 17 gram packet Take 17 g by mouth once daily as needed (constipation). (Patient not taking: Reported on 4/15/2024) 10 each 0     No current facility-administered medications on file prior to visit.     Immunization History   Administered Date(s) Administered    Flu vaccine (IIV4), preservative free *Check age/dose* 10/07/2020, 03/11/2023, 01/17/2024    Hep B Immune Globulin 02/18/2013, 03/18/2013    Influenza, Unspecified 10/01/2005    Influenza, injectable, quadrivalent 12/07/2015, 10/11/2016, 10/19/2017, 11/26/2018, 10/18/2019    Influenza, seasonal, injectable 10/22/2009, 09/20/2011, 10/17/2012, 11/15/2013, 11/15/2014    Moderna SARS-CoV-2 Vaccination 04/17/2022, 05/15/2022     Pneumococcal conjugate vaccine, 20-valent (PREVNAR 20) 01/17/2024    Pneumococcal polysaccharide vaccine, 23-valent, age 2 years and older (PNEUMOVAX 23) 02/01/2005, 04/22/2006    Td vaccine, age 7 years and older (TDVAX) 09/12/2003, 04/22/2005, 06/14/2005    Tdap vaccine, age 7 year and older (BOOSTRIX, ADACEL) 08/26/2014     Patient's medical history was reviewed and updated either before or during this encounter.       Victoria Aguirre, APRN-CNP

## 2024-05-01 ENCOUNTER — TELEPHONE (OUTPATIENT)
Dept: HEMATOLOGY/ONCOLOGY | Facility: HOSPITAL | Age: 64
End: 2024-05-01
Payer: COMMERCIAL

## 2024-05-01 ENCOUNTER — APPOINTMENT (OUTPATIENT)
Dept: HEMATOLOGY/ONCOLOGY | Facility: HOSPITAL | Age: 64
End: 2024-05-01
Payer: COMMERCIAL

## 2024-05-01 NOTE — TELEPHONE ENCOUNTER
Received attached [employer] cancellation request. Patient is scheduled for HEM ONC ESTABLISHED PT NEW PHYSICIAN today at 1 PM with Dr. Finnegan. Patient would like to cancel appointment. Attempted to reach patient to reschedule. Left detailed message, with call back number, for patient to call back at earliest convenience. Sent patient message via [employer] as well.

## 2024-05-01 NOTE — TELEPHONE ENCOUNTER
----- Message -----   From: Rome Mullins   Sent: 5/1/2024  12:38 AM EDT   To: [place] [address] Clerical   Subject: Appointment Cancellation Request                  Rome Mullins would like to cancel the following appointments:      Edward Sims in [place] [address] ([phone]), 5/1/2024  1:00 PM      Comments:

## 2024-05-03 NOTE — TELEPHONE ENCOUNTER
Received attached [employer] cancellation request. Patient was scheduled for HEM ONC ESTABLISHED PT NEW PHYSICIAN on 5/1/24 at 1 PM with Dr. Finnegan. Patient requested to cancel appointment. Attempted to reach patient x2 to reschedule. Left detailed message, with call back number, for patient to call back at earliest convenience. Sent [employer] message on 5/1. Per [employer], patient read message on 5/1 at 7:53 AM.

## 2024-05-07 NOTE — TELEPHONE ENCOUNTER
Received attached [employer] cancellation request. Patient was scheduled for HEM ONC ESTABLISHED PT NEW PHYSICIAN on 5/1/24 at 1 PM with Dr. Finnegan. Patient requested to cancel appointment. Attempted to reach patient x3 to reschedule. Left detailed message, with call back number, for patient to call back at earliest convenience. 3 attempts made to reach patient. Attempt to reach letter sent via [employer] and mail. Secure chat sent to nurse partners to notify PCP and/or referring provider of failure to follow up.

## 2024-05-08 NOTE — TELEPHONE ENCOUNTER
Patient returned phone call to reschedule HEM ONC ESTABLISHED PT NEW PHYSICIAN appointment with Dr. Finnegan. Patient requested for appointment to rescheduled on either Wednesday or Thursday around 12 or 1 PM. Rescheduled to Thursday, 5/23/24 at 11:30 AM. Patient verbalized understanding and agreed to appointment.

## 2024-05-09 ENCOUNTER — OFFICE VISIT (OUTPATIENT)
Dept: VASCULAR SURGERY | Facility: CLINIC | Age: 64
End: 2024-05-09
Payer: COMMERCIAL

## 2024-05-09 VITALS
BODY MASS INDEX: 38.83 KG/M2 | OXYGEN SATURATION: 94 % | HEIGHT: 73 IN | SYSTOLIC BLOOD PRESSURE: 157 MMHG | WEIGHT: 293 LBS | DIASTOLIC BLOOD PRESSURE: 74 MMHG | HEART RATE: 63 BPM

## 2024-05-09 DIAGNOSIS — I87.2 VENOUS INSUFFICIENCY OF BOTH LOWER EXTREMITIES: Primary | ICD-10-CM

## 2024-05-09 DIAGNOSIS — M79.3 LIPODERMATOSCLEROSIS OF BOTH LOWER EXTREMITIES: ICD-10-CM

## 2024-05-09 DIAGNOSIS — Z95.828 PRESENCE OF IVC FILTER: ICD-10-CM

## 2024-05-09 DIAGNOSIS — R09.89 CORONA PHLEBECTATICA PARAPLANTARIS: ICD-10-CM

## 2024-05-09 PROCEDURE — 3075F SYST BP GE 130 - 139MM HG: CPT | Performed by: SURGERY

## 2024-05-09 PROCEDURE — 99215 OFFICE O/P EST HI 40 MIN: CPT | Performed by: SURGERY

## 2024-05-09 PROCEDURE — 3078F DIAST BP <80 MM HG: CPT | Performed by: SURGERY

## 2024-05-09 PROCEDURE — 99205 OFFICE O/P NEW HI 60 MIN: CPT | Performed by: SURGERY

## 2024-05-09 PROCEDURE — 1036F TOBACCO NON-USER: CPT | Performed by: SURGERY

## 2024-05-09 RX ORDER — OMEPRAZOLE 40 MG/1
40 CAPSULE, DELAYED RELEASE ORAL DAILY
COMMUNITY
Start: 2024-04-17

## 2024-05-09 ASSESSMENT — ENCOUNTER SYMPTOMS
DEPRESSION: 0
LOSS OF SENSATION IN FEET: 0
OCCASIONAL FEELINGS OF UNSTEADINESS: 0

## 2024-05-09 ASSESSMENT — PATIENT HEALTH QUESTIONNAIRE - PHQ9
2. FEELING DOWN, DEPRESSED OR HOPELESS: NOT AT ALL
SUM OF ALL RESPONSES TO PHQ9 QUESTIONS 1 AND 2: 0
1. LITTLE INTEREST OR PLEASURE IN DOING THINGS: NOT AT ALL
SUM OF ALL RESPONSES TO PHQ9 QUESTIONS 1 AND 2: 0
2. FEELING DOWN, DEPRESSED OR HOPELESS: NOT AT ALL
1. LITTLE INTEREST OR PLEASURE IN DOING THINGS: NOT AT ALL

## 2024-05-09 ASSESSMENT — COLUMBIA-SUICIDE SEVERITY RATING SCALE - C-SSRS
2. HAVE YOU ACTUALLY HAD ANY THOUGHTS OF KILLING YOURSELF?: NO
1. IN THE PAST MONTH, HAVE YOU WISHED YOU WERE DEAD OR WISHED YOU COULD GO TO SLEEP AND NOT WAKE UP?: NO
6. HAVE YOU EVER DONE ANYTHING, STARTED TO DO ANYTHING, OR PREPARED TO DO ANYTHING TO END YOUR LIFE?: NO
6. HAVE YOU EVER DONE ANYTHING, STARTED TO DO ANYTHING, OR PREPARED TO DO ANYTHING TO END YOUR LIFE?: NO
2. HAVE YOU ACTUALLY HAD ANY THOUGHTS OF KILLING YOURSELF?: NO
1. IN THE PAST MONTH, HAVE YOU WISHED YOU WERE DEAD OR WISHED YOU COULD GO TO SLEEP AND NOT WAKE UP?: NO

## 2024-05-09 ASSESSMENT — PAIN SCALES - GENERAL: PAINLEVEL: 0-NO PAIN

## 2024-05-09 NOTE — PATIENT INSTRUCTIONS
It was a pleasure taking care of you today and appreciate your seeing us at our [facility].     Today's plan is as follows:  1) we will proceed with venous insufficiency studies of the legs and your IVC for your filter - please get this at [facility] and then a virtual visit to follow with me  2) socks to get include jobst, or juzo or sigvaris - [employer] or [employer]  - daily knee high 20-30mmHg compression      Please call the office with any questions at [phone].   You can speak to our secretaries or our clinical nurses for specific questions.   For [facility] specific questions, you can also call [phone] or email at [email or web address]  If you need coordinating your appointments and testing you can do these at the  or by calling my office shortly after your visit.    \

## 2024-05-09 NOTE — PROGRESS NOTES
NPV REASON: cvi, ulceration history    CURRENT ENCOUNTER:  Rome Mullins is 63 y.o. male here for follow up of cvi, ulceration history.    Has LDS changes In right leg  Started in 2/14 after a back fusion surgery  Had fusion in lumbar region - had left leg swelling followed by right leg swelling in his recovery period  Now numb from knees to toes - gets shooting pain down to toes depending on how he sits    Has been years with LDS - right worse than left;   Gets worse with walking -   Unable to wear the compression he purchased due to return fees but they don't fit    Right leg: has had blisters and weeping - last was 3 mos ago; happened about 2 times  Left leg; none    + Clotting d/o   +h/o DVT; left leg in 2002 after 2nd back surgery; had a PE as well; Pharminox filter was placed.   3/2024 PE after covid;   No fam hx thrombotic history    Recent COVID with hospitalization 1 month at [facility] - discharged with 4L oxygen which uses at night.     mEDICAL HISTORY OF HTN, HLP, anxiety, MDD, a fib on eliquis  DM, VICENTE not using CPAP [He also had a PFT which shows an FEV1 of 73% he had some restriction in his airways]    RIGHT LEG C4b Lipodermatosclerosis or atrophie raad, C4c Corona Phlebectatica, and C5 Healed ulcer  RIGHT LEG PAIN 2, VARICOSE VEINS 2, VENOUS EDEMA 1, SKIN PIGMENTATION 2, INFLAMMATION 2, and USE OF COMPRESSION 2  RIGHT LEG CEAP: C5  RIGHT LEG VCSS SCORE: 11      LEFT LEG C4b Lipodermatosclerosis or atrophie raad and C4c Corona Phlebectatica  LEFT LEG PAIN 2, VARICOSE VEINS 2, VENOUS EDEMA 2, SKIN PIGMENTATION 2, and INFLAMMATION 2  LEFT LEG CEAP: C4c  LEFT LEG VCSS SCORE: 10    Only DVT on the left  Has paresthesias and numbness and probably some heavyness form the spine     LEFT LEG PAIN: 1, CRAMPS:2, HEAVINESS: 1, PARESTHESIAS: 2, PRURITUS: 1, PRETIBIAL EDEMA: 1, HYPERPIGMENTATION: 1, and VENOUS ECTASIA: 1  LEFT LEG Vilalta total score: 10  Left leg PTS severity:  moderate      PastMedHX:  Past Medical History:   Diagnosis Date    Allergic     Anxiety     Arthritis 09/01/2023    Clotting disorder (Multi)     Depression     Difficulty walking 2003    HL (hearing loss) 05/01/2023    Hypertension 2000?    Urinary tract infection 01/01/2015       Meds:     Current Outpatient Medications:     acetaminophen (Tylenol) 500 mg tablet, Take 2 tablets (1,000 mg) by mouth every 8 hours if needed., Disp: , Rfl:     apixaban (Eliquis) 5 mg tablet, Take 1 tablet (5 mg) by mouth 2 times a day. with or without food, Disp: , Rfl:     atorvastatin (Lipitor) 10 mg tablet, Take 1 tablet (10 mg) by mouth once daily., Disp: 90 tablet, Rfl: 3    gabapentin (Neurontin) 600 mg tablet, Take 1 tablet (600 mg) by mouth 3 times a day., Disp: , Rfl:     lisinopril 10 mg tablet, Take 1 tablet (10 mg) by mouth once daily., Disp: , Rfl:     metFORMIN (Glucophage) 500 mg tablet, Take 1 tablet (500 mg) by mouth once daily with breakfast., Disp: 90 tablet, Rfl: 3    metoprolol tartrate (Lopressor) 25 mg tablet, Take 1 tablet (25 mg) by mouth 2 times a day with meals., Disp: , Rfl:     omeprazole (PriLOSEC) 40 mg DR capsule, Take 1 capsule (40 mg) by mouth once daily., Disp: , Rfl:     sertraline (Zoloft) 100 mg tablet, Take 1 tablet (100 mg) by mouth once daily., Disp: , Rfl:     sildenafil (Viagra) 100 mg tablet, Take 1 tablet (100 mg) by mouth once daily as needed., Disp: , Rfl:     Allergies:   Allergies   Allergen Reactions    Fentanyl Palpitations and Unknown     rapid heart rate    Duloxetine GI Upset    Venlafaxine GI Upset    Methadone Palpitations       ROS:  Review of Systems     Objective:  Vitals:  Vitals:    05/09/24 1110   BP: 157/74   Pulse:    SpO2:         Exam:  No distress  Breathing comfortably   Not tachycardic  Palpable bilateral radial pulses  Abd soft, nt, nd, no stigmata of IVC occlusion  B/l legs with intact pulses and well perfused feet  Mild b/l pedal edema  +corona phlebectatica  R>L  LDS R>L                      Labs:  Lab Results   Component Value Date    WBC 10.7 03/21/2024    WBC 6.9 01/07/2024    WBC 7.2 01/06/2024    HGB 14.7 03/21/2024    HGB 13.2 (L) 01/07/2024    HGB 13.2 (L) 01/06/2024    HCT 45.3 03/21/2024    HCT 41.0 01/07/2024    HCT 40.3 (L) 01/06/2024    MCV 89 03/21/2024    MCV 90 01/07/2024    MCV 90 01/06/2024     03/21/2024     Lab Results   Component Value Date    CREATININE 0.84 03/21/2024    CREATININE 0.96 01/07/2024    CREATININE 0.91 01/06/2024    BUN 19 03/21/2024    BUN 19 01/07/2024    BUN 15 01/06/2024     (L) 03/21/2024     (L) 01/07/2024     (L) 01/06/2024    K 4.1 03/21/2024    K 3.9 01/07/2024    K 4.0 01/06/2024     03/21/2024     01/07/2024     01/06/2024    CO2 24 03/21/2024    CO2 22 01/07/2024    CO2 25 01/06/2024         Imaging:  CT scan - +IVC filter, naomy with open IVC at that time. 2011    Assessment & Plan:  Rome Mullins is 63 y.o. male with history of cvi, ulceration history.  HTN, HLP, anxiety, MDD, a fib on eliquis, DM, VICENTE not using CPAP   H/o fusion in lumbar region - Now numb from knees to toes - gets shooting pain down to toes depending on how he sits    Has been years with LDS - right worse than left;   Right leg: has had blisters and weeping - last was 3 mos ago; happened about 2 times  Left leg; none    + Clotting d/o   +h/o DVT; left leg in 2002 after 2nd back surgery; had a PE as well; naomy filter was placed.   3/2024 PE after covid;   No fam hx thrombotic history    Recent COVID with hospitalization 1 month at [facility] - discharged with 4L oxygen which uses at night.     RIGHT LEG C4b Lipodermatosclerosis or atrophie raad, C4c Corona Phlebectatica, and C5 Healed ulcer  RIGHT LEG VCSS SCORE: 11  LEFT LEG C4b Lipodermatosclerosis or atrophie raad and C4c Corona Phlebectatica  LEFT LEG VCSS SCORE: 10    Only DVT on the left  LEFT LEG Vilalta total score: 10  Left leg PTS  severity: moderate      1) we will proceed with venous insufficiency studies of the legs and your IVC for your filter - please get this at [facility] and then a virtual visit to follow with me  2) socks to get include jobst, or juzo or sigvaris - [employer] or [employer]  - daily knee high 20-30mmHg compression    La Cast MD, MHS, RPVI  , [facility] [employer]  Director, Center for Comprehensive Venous Care, [facility] [place]  Co-Director, Vascular Laboratories, [facility] [place]  Division of Vascular Surgery and Endovascular Therapy  [facility]

## 2024-05-23 ENCOUNTER — OFFICE VISIT (OUTPATIENT)
Dept: HEMATOLOGY/ONCOLOGY | Facility: HOSPITAL | Age: 64
End: 2024-05-23
Payer: COMMERCIAL

## 2024-05-23 VITALS
BODY MASS INDEX: 39.2 KG/M2 | SYSTOLIC BLOOD PRESSURE: 116 MMHG | TEMPERATURE: 96.4 F | DIASTOLIC BLOOD PRESSURE: 69 MMHG | HEIGHT: 73 IN | WEIGHT: 295.75 LBS | RESPIRATION RATE: 16 BRPM | HEART RATE: 67 BPM | OXYGEN SATURATION: 92 %

## 2024-05-23 DIAGNOSIS — Z86.711 HISTORY OF PULMONARY EMBOLISM: Primary | ICD-10-CM

## 2024-05-23 PROCEDURE — 99214 OFFICE O/P EST MOD 30 MIN: CPT | Performed by: INTERNAL MEDICINE

## 2024-05-23 PROCEDURE — 3078F DIAST BP <80 MM HG: CPT | Performed by: INTERNAL MEDICINE

## 2024-05-23 PROCEDURE — 3074F SYST BP LT 130 MM HG: CPT | Performed by: INTERNAL MEDICINE

## 2024-05-23 ASSESSMENT — ENCOUNTER SYMPTOMS
DEPRESSION: 1
LOSS OF SENSATION IN FEET: 1
OCCASIONAL FEELINGS OF UNSTEADINESS: 1

## 2024-05-23 ASSESSMENT — PATIENT HEALTH QUESTIONNAIRE - PHQ9
10. IF YOU CHECKED OFF ANY PROBLEMS, HOW DIFFICULT HAVE THESE PROBLEMS MADE IT FOR YOU TO DO YOUR WORK, TAKE CARE OF THINGS AT HOME, OR GET ALONG WITH OTHER PEOPLE: NOT DIFFICULT AT ALL
SUM OF ALL RESPONSES TO PHQ9 QUESTIONS 1 AND 2: 2
1. LITTLE INTEREST OR PLEASURE IN DOING THINGS: SEVERAL DAYS
2. FEELING DOWN, DEPRESSED OR HOPELESS: SEVERAL DAYS

## 2024-05-23 ASSESSMENT — COLUMBIA-SUICIDE SEVERITY RATING SCALE - C-SSRS
6. HAVE YOU EVER DONE ANYTHING, STARTED TO DO ANYTHING, OR PREPARED TO DO ANYTHING TO END YOUR LIFE?: NO
1. IN THE PAST MONTH, HAVE YOU WISHED YOU WERE DEAD OR WISHED YOU COULD GO TO SLEEP AND NOT WAKE UP?: NO
2. HAVE YOU ACTUALLY HAD ANY THOUGHTS OF KILLING YOURSELF?: NO

## 2024-05-23 ASSESSMENT — PAIN SCALES - GENERAL: PAINLEVEL: 6

## 2024-05-23 NOTE — PROGRESS NOTES
"Patient ID: Rome Mullins is a 63 y.o. male.    Subjective:  Returns for follow up for his h/o PE. Denies having new complaints. Rarely sees blood in stool. Has EGD/Colonoscopy recently in 2024 @ [facility] that was OK. Denies pica. No worsening of leg swelling.     Heme/Onc History:  Stage/Status:  - 2002: Distal LLE DVT and PE after back surgery => 6 month anti-coagulation with Lovenox and coumadin  - Mar 2023: Unprovoked LLL PE => Started on Eliquis. Due to h/o A fib (first episode in 2015 and last one likely in June 2020 - I saw the EKG from June 2020 showing A fib with RVR), continued with 5 mg po bid    Assessment/Plan:  ? h/o PE: Needed lifelong anti-coagulation since this was his second VTE and was unprovoked. He could take 2.5 mg po bid at prophylactic dose if he did not have A fib history though. I will refer him to Cardiology for any change to management of A fib. He will continue at 5 mg po bid. If no change to the management, he will continue with the same dose loifelong. Pt concurs. No sign of iuron deficiency. Last CBC in Mar was OK.    Review Of Systems:  Review of Systems - Oncology    Physical Exam:  /69 (BP Location: Left arm, Patient Position: Sitting, BP Cuff Size: Adult)   Pulse 67   Temp 35.8 °C (96.4 °F) (Temporal)   Resp 16   Ht 1.854 m (6' 1\")   Wt 134 kg (295 lb 12 oz)   SpO2 92%   BMI 39.02 kg/m²   BSA: 2.63 meters squared  Performance Status: Symptomatic; fully ambulatory  Physical Exam    Results:  Diagnostic Results   Lab Results   Component Value Date    WBC 10.7 03/21/2024    HGB 14.7 03/21/2024    HCT 45.3 03/21/2024    MCV 89 03/21/2024     03/21/2024     Lab Results   Component Value Date    CALCIUM 9.7 03/21/2024     (L) 03/21/2024    K 4.1 03/21/2024    CO2 24 03/21/2024     03/21/2024    BUN 19 03/21/2024    CREATININE 0.84 03/21/2024    ALT 16 03/21/2024    AST 21 03/21/2024       Current Outpatient Medications:     acetaminophen " (Tylenol) 500 mg tablet, Take 2 tablets (1,000 mg) by mouth every 8 hours if needed., Disp: , Rfl:     apixaban (Eliquis) 5 mg tablet, Take 1 tablet (5 mg) by mouth 2 times a day. with or without food, Disp: , Rfl:     atorvastatin (Lipitor) 10 mg tablet, Take 1 tablet (10 mg) by mouth once daily., Disp: 90 tablet, Rfl: 3    gabapentin (Neurontin) 600 mg tablet, Take 1 tablet (600 mg) by mouth 3 times a day., Disp: , Rfl:     lisinopril 10 mg tablet, Take 1 tablet (10 mg) by mouth once daily., Disp: , Rfl:     metFORMIN (Glucophage) 500 mg tablet, Take 1 tablet (500 mg) by mouth once daily with breakfast., Disp: 90 tablet, Rfl: 3    metoprolol tartrate (Lopressor) 25 mg tablet, Take 1 tablet (25 mg) by mouth 2 times daily (morning and late afternoon)., Disp: , Rfl:     omeprazole (PriLOSEC) 40 mg DR capsule, Take 1 capsule (40 mg) by mouth once daily., Disp: , Rfl:     sertraline (Zoloft) 100 mg tablet, Take 1 tablet (100 mg) by mouth once daily., Disp: , Rfl:     sildenafil (Viagra) 100 mg tablet, Take 1 tablet (100 mg) by mouth once daily as needed., Disp: , Rfl:      Past Surgical History:   Procedure Laterality Date    BACK SURGERY  01/01/2002    CHOLECYSTECTOMY  01/01/2008    HERNIA REPAIR  01/01/2011    JOINT REPLACEMENT  01/01/2010    SPINE SURGERY  01/01/2002    TOENAIL EXCISION  2019    TONSILLECTOMY  01/01/1971    WISDOM TOOTH EXTRACTION  01/01/1998     Family History   Problem Relation Name Age of Onset    Mental illness Mother Chey     Hypertension Mother Chey     Diabetes Mother Chey     Peripheral vascular disease Father Sd     Diabetes Father Sd     Hypertension Father Sd     Cancer Father Sd     No Known Problems Sister      No Known Problems Daughter      No Known Problems Son      Diabetes Other fam hx     Heart disease Other fam hx     Stroke Other fam hx     Hypertension Other fam hx     Cancer Other fam hx       reports that he quit smoking about 35 years ago. His smoking use included  cigarettes and cigars. He started smoking about 50 years ago. He has a 14.7 pack-year smoking history. He has never been exposed to tobacco smoke. He has quit using smokeless tobacco.    Diagnoses and all orders for this visit:  History of pulmonary embolism  -     Clinic Appointment Request; Future       I spent more than 30 minutes for the patient today, including face-to-face conversation, pre-visit preparation, post-visit orders, and others.   Edward Sims MD

## 2024-05-28 ENCOUNTER — APPOINTMENT (OUTPATIENT)
Dept: RADIOLOGY | Facility: HOSPITAL | Age: 64
End: 2024-05-28
Payer: COMMERCIAL

## 2024-05-28 ENCOUNTER — APPOINTMENT (OUTPATIENT)
Dept: CARDIOLOGY | Facility: HOSPITAL | Age: 64
End: 2024-05-28
Payer: COMMERCIAL

## 2024-05-28 ENCOUNTER — HOSPITAL ENCOUNTER (EMERGENCY)
Facility: HOSPITAL | Age: 64
Discharge: HOME | End: 2024-05-28
Attending: EMERGENCY MEDICINE
Payer: COMMERCIAL

## 2024-05-28 VITALS
DIASTOLIC BLOOD PRESSURE: 74 MMHG | RESPIRATION RATE: 18 BRPM | TEMPERATURE: 98.1 F | SYSTOLIC BLOOD PRESSURE: 142 MMHG | WEIGHT: 290 LBS | HEIGHT: 73 IN | OXYGEN SATURATION: 94 % | BODY MASS INDEX: 38.43 KG/M2 | HEART RATE: 82 BPM

## 2024-05-28 DIAGNOSIS — M54.50 LOW BACK PAIN WITH RADIATION: Primary | ICD-10-CM

## 2024-05-28 DIAGNOSIS — J18.9 PNEUMONIA OF RIGHT LOWER LOBE DUE TO INFECTIOUS ORGANISM: Primary | ICD-10-CM

## 2024-05-28 DIAGNOSIS — M25.551 PAIN OF RIGHT HIP: ICD-10-CM

## 2024-05-28 LAB
ALBUMIN SERPL BCP-MCNC: 3.8 G/DL (ref 3.4–5)
ALP SERPL-CCNC: 74 U/L (ref 33–136)
ALT SERPL W P-5'-P-CCNC: 12 U/L (ref 10–52)
ANION GAP SERPL CALC-SCNC: 14 MMOL/L (ref 10–20)
AST SERPL W P-5'-P-CCNC: 16 U/L (ref 9–39)
ATRIAL RATE: 101 BPM
BASOPHILS # BLD AUTO: 0.06 X10*3/UL (ref 0–0.1)
BASOPHILS NFR BLD AUTO: 0.4 %
BILIRUB SERPL-MCNC: 0.5 MG/DL (ref 0–1.2)
BNP SERPL-MCNC: 31 PG/ML (ref 0–99)
BUN SERPL-MCNC: 21 MG/DL (ref 6–23)
CALCIUM SERPL-MCNC: 9.2 MG/DL (ref 8.6–10.3)
CARDIAC TROPONIN I PNL SERPL HS: 5 NG/L (ref 0–20)
CHLORIDE SERPL-SCNC: 102 MMOL/L (ref 98–107)
CO2 SERPL-SCNC: 22 MMOL/L (ref 21–32)
CREAT SERPL-MCNC: 1.49 MG/DL (ref 0.5–1.3)
D DIMER PPP FEU-MCNC: 1627 NG/ML FEU
EGFRCR SERPLBLD CKD-EPI 2021: 53 ML/MIN/1.73M*2
EOSINOPHIL # BLD AUTO: 0.16 X10*3/UL (ref 0–0.7)
EOSINOPHIL NFR BLD AUTO: 1.1 %
ERYTHROCYTE [DISTWIDTH] IN BLOOD BY AUTOMATED COUNT: 13.3 % (ref 11.5–14.5)
GLUCOSE SERPL-MCNC: 151 MG/DL (ref 74–99)
HCT VFR BLD AUTO: 38.4 % (ref 41–52)
HGB BLD-MCNC: 12.8 G/DL (ref 13.5–17.5)
IMM GRANULOCYTES # BLD AUTO: 0.13 X10*3/UL (ref 0–0.7)
IMM GRANULOCYTES NFR BLD AUTO: 0.9 % (ref 0–0.9)
LYMPHOCYTES # BLD AUTO: 2.74 X10*3/UL (ref 1.2–4.8)
LYMPHOCYTES NFR BLD AUTO: 19.1 %
MCH RBC QN AUTO: 29.2 PG (ref 26–34)
MCHC RBC AUTO-ENTMCNC: 33.3 G/DL (ref 32–36)
MCV RBC AUTO: 88 FL (ref 80–100)
MONOCYTES # BLD AUTO: 1.88 X10*3/UL (ref 0.1–1)
MONOCYTES NFR BLD AUTO: 13.1 %
NEUTROPHILS # BLD AUTO: 9.36 X10*3/UL (ref 1.2–7.7)
NEUTROPHILS NFR BLD AUTO: 65.4 %
NRBC BLD-RTO: 0 /100 WBCS (ref 0–0)
P AXIS: 36 DEGREES
P OFFSET: 200 MS
P ONSET: 138 MS
PLATELET # BLD AUTO: 463 X10*3/UL (ref 150–450)
POTASSIUM SERPL-SCNC: 3.8 MMOL/L (ref 3.5–5.3)
PR INTERVAL: 146 MS
PROT SERPL-MCNC: 7.6 G/DL (ref 6.4–8.2)
Q ONSET: 211 MS
QRS COUNT: 16 BEATS
QRS DURATION: 88 MS
QT INTERVAL: 336 MS
QTC CALCULATION(BAZETT): 435 MS
QTC FREDERICIA: 399 MS
R AXIS: -26 DEGREES
RBC # BLD AUTO: 4.39 X10*6/UL (ref 4.5–5.9)
SODIUM SERPL-SCNC: 134 MMOL/L (ref 136–145)
T AXIS: 42 DEGREES
T OFFSET: 379 MS
VENTRICULAR RATE: 101 BPM
WBC # BLD AUTO: 14.3 X10*3/UL (ref 4.4–11.3)

## 2024-05-28 PROCEDURE — 2500000001 HC RX 250 WO HCPCS SELF ADMINISTERED DRUGS (ALT 637 FOR MEDICARE OP)

## 2024-05-28 PROCEDURE — 93005 ELECTROCARDIOGRAM TRACING: CPT

## 2024-05-28 PROCEDURE — 73502 X-RAY EXAM HIP UNI 2-3 VIEWS: CPT | Mod: RT

## 2024-05-28 PROCEDURE — 84484 ASSAY OF TROPONIN QUANT: CPT | Performed by: EMERGENCY MEDICINE

## 2024-05-28 PROCEDURE — 85025 COMPLETE CBC W/AUTO DIFF WBC: CPT | Performed by: EMERGENCY MEDICINE

## 2024-05-28 PROCEDURE — 73502 X-RAY EXAM HIP UNI 2-3 VIEWS: CPT | Mod: RIGHT SIDE | Performed by: RADIOLOGY

## 2024-05-28 PROCEDURE — 71275 CT ANGIOGRAPHY CHEST: CPT

## 2024-05-28 PROCEDURE — 96375 TX/PRO/DX INJ NEW DRUG ADDON: CPT

## 2024-05-28 PROCEDURE — 2500000004 HC RX 250 GENERAL PHARMACY W/ HCPCS (ALT 636 FOR OP/ED): Performed by: EMERGENCY MEDICINE

## 2024-05-28 PROCEDURE — 83880 ASSAY OF NATRIURETIC PEPTIDE: CPT | Performed by: EMERGENCY MEDICINE

## 2024-05-28 PROCEDURE — 71275 CT ANGIOGRAPHY CHEST: CPT | Performed by: RADIOLOGY

## 2024-05-28 PROCEDURE — 2550000001 HC RX 255 CONTRASTS: Performed by: EMERGENCY MEDICINE

## 2024-05-28 PROCEDURE — 85379 FIBRIN DEGRADATION QUANT: CPT | Performed by: EMERGENCY MEDICINE

## 2024-05-28 PROCEDURE — 99285 EMERGENCY DEPT VISIT HI MDM: CPT | Mod: 25

## 2024-05-28 PROCEDURE — 80053 COMPREHEN METABOLIC PANEL: CPT | Performed by: EMERGENCY MEDICINE

## 2024-05-28 PROCEDURE — 96365 THER/PROPH/DIAG IV INF INIT: CPT

## 2024-05-28 PROCEDURE — 36415 COLL VENOUS BLD VENIPUNCTURE: CPT | Performed by: EMERGENCY MEDICINE

## 2024-05-28 RX ORDER — HYDROCODONE BITARTRATE AND ACETAMINOPHEN 5; 325 MG/1; MG/1
1 TABLET ORAL EVERY 6 HOURS PRN
Qty: 12 TABLET | Refills: 0 | Status: SHIPPED | OUTPATIENT
Start: 2024-05-28 | End: 2024-06-02

## 2024-05-28 RX ORDER — ONDANSETRON HYDROCHLORIDE 2 MG/ML
4 INJECTION, SOLUTION INTRAVENOUS ONCE
Status: COMPLETED | OUTPATIENT
Start: 2024-05-28 | End: 2024-05-28

## 2024-05-28 RX ORDER — MORPHINE SULFATE 4 MG/ML
4 INJECTION, SOLUTION INTRAMUSCULAR; INTRAVENOUS ONCE
Status: COMPLETED | OUTPATIENT
Start: 2024-05-28 | End: 2024-05-28

## 2024-05-28 RX ORDER — CEFTRIAXONE 1 G/50ML
1 INJECTION, SOLUTION INTRAVENOUS ONCE
Status: COMPLETED | OUTPATIENT
Start: 2024-05-28 | End: 2024-05-28

## 2024-05-28 RX ORDER — HYDROCODONE BITARTRATE AND ACETAMINOPHEN 5; 325 MG/1; MG/1
1 TABLET ORAL ONCE
Status: CANCELLED | OUTPATIENT
Start: 2024-05-28 | End: 2024-05-28

## 2024-05-28 RX ORDER — HYDROCODONE BITARTRATE AND ACETAMINOPHEN 5; 325 MG/1; MG/1
1 TABLET ORAL ONCE
Status: COMPLETED | OUTPATIENT
Start: 2024-05-28 | End: 2024-05-28

## 2024-05-28 RX ORDER — HYDROCODONE BITARTRATE AND ACETAMINOPHEN 5; 325 MG/1; MG/1
TABLET ORAL
Status: COMPLETED
Start: 2024-05-28 | End: 2024-05-28

## 2024-05-28 RX ORDER — ALBUTEROL SULFATE 90 UG/1
1-2 AEROSOL, METERED RESPIRATORY (INHALATION) EVERY 6 HOURS PRN
Qty: 18 G | Refills: 1 | Status: SHIPPED | OUTPATIENT
Start: 2024-05-28 | End: 2024-06-27

## 2024-05-28 RX ORDER — AZITHROMYCIN 250 MG/1
250 TABLET, FILM COATED ORAL DAILY
Qty: 6 TABLET | Refills: 0 | Status: SHIPPED | OUTPATIENT
Start: 2024-05-28 | End: 2024-06-02

## 2024-05-28 RX ORDER — PREDNISONE 10 MG/1
10 TABLET ORAL DAILY
Qty: 5 TABLET | Refills: 0 | Status: SHIPPED | OUTPATIENT
Start: 2024-05-28 | End: 2024-06-02

## 2024-05-28 RX ORDER — GABAPENTIN 600 MG/1
600 TABLET ORAL 3 TIMES DAILY
Qty: 270 TABLET | Refills: 0 | Status: SHIPPED | OUTPATIENT
Start: 2024-05-28

## 2024-05-28 RX ADMIN — METHYLPREDNISOLONE SODIUM SUCCINATE 125 MG: 125 INJECTION, POWDER, FOR SOLUTION INTRAMUSCULAR; INTRAVENOUS at 04:10

## 2024-05-28 RX ADMIN — MORPHINE SULFATE 4 MG: 4 INJECTION, SOLUTION INTRAMUSCULAR; INTRAVENOUS at 01:10

## 2024-05-28 RX ADMIN — HYDROCODONE BITARTRATE AND ACETAMINOPHEN 1 TABLET: 5; 325 TABLET ORAL at 05:00

## 2024-05-28 RX ADMIN — CEFTRIAXONE 1 G: 1 INJECTION, SOLUTION INTRAVENOUS at 04:10

## 2024-05-28 RX ADMIN — IOHEXOL 75 ML: 350 INJECTION, SOLUTION INTRAVENOUS at 02:32

## 2024-05-28 RX ADMIN — ONDANSETRON 4 MG: 2 INJECTION INTRAMUSCULAR; INTRAVENOUS at 01:09

## 2024-05-28 ASSESSMENT — PAIN SCALES - GENERAL: PAINLEVEL_OUTOF10: 7

## 2024-05-28 ASSESSMENT — COLUMBIA-SUICIDE SEVERITY RATING SCALE - C-SSRS
1. IN THE PAST MONTH, HAVE YOU WISHED YOU WERE DEAD OR WISHED YOU COULD GO TO SLEEP AND NOT WAKE UP?: NO
2. HAVE YOU ACTUALLY HAD ANY THOUGHTS OF KILLING YOURSELF?: NO
6. HAVE YOU EVER DONE ANYTHING, STARTED TO DO ANYTHING, OR PREPARED TO DO ANYTHING TO END YOUR LIFE?: NO

## 2024-05-28 ASSESSMENT — PAIN DESCRIPTION - LOCATION: LOCATION: HIP

## 2024-05-28 ASSESSMENT — PAIN DESCRIPTION - ORIENTATION: ORIENTATION: RIGHT

## 2024-05-28 ASSESSMENT — PAIN - FUNCTIONAL ASSESSMENT: PAIN_FUNCTIONAL_ASSESSMENT: 0-10

## 2024-05-28 ASSESSMENT — PAIN DESCRIPTION - PAIN TYPE: TYPE: CHRONIC PAIN

## 2024-05-28 NOTE — ED PROVIDER NOTES
"HPI   Chief Complaint   Patient presents with    Shortness of Breath     Pt with shortness of breath getting worse for last 2 days, pt also was picking up sticks and now has rt hip and leg pain. Pt also states he has had a cough and told his PCP \"but she only wanted to tell me how her  has one too\", pt concerned about another blood clot, pt on eloquis BID       Patient comes in tonight because of shortness of breath, right hip pain and right chest pain.  He does have a history of DVT and pulmonary embolus and has a [place] filter and is on Eliquis.  He says his right chest is sore and when I press on the lower anterior lateral rib cage she does wince but he says the pain is much worse with a deep breath.  He has had a cough for the past month and saw his primary care physician who put him on prednisone for a few days and thought this was more of pleuritis or inflammation, but he says that he never got better.  Over the last few days of breathing problems gotten worse.  No nausea vomiting or diaphoresis.  No sputum production.  His cough has continued, however.  He also has pain in his right hip and is tender over the greater trochanter.  He says the only thing he is done recently is bending over and lifting branches with his son.  The next day is when the hip started hurting.  This patient is a former smoker, but he stopped smoking in 1989.                        [place] Coma Scale Score: 15                     Patient History   Past Medical History:   Diagnosis Date    Allergic     Anxiety     Arthritis 09/01/2023    Clotting disorder (Multi)     Depression     Difficulty walking 2003    HL (hearing loss) 05/01/2023    Hypertension 2000?    Urinary tract infection 01/01/2015     Past Surgical History:   Procedure Laterality Date    BACK SURGERY  01/01/2002    CHOLECYSTECTOMY  01/01/2008    HERNIA REPAIR  01/01/2011    JOINT REPLACEMENT  01/01/2010    SPINE SURGERY  01/01/2002    TOENAIL EXCISION  2019    " TONSILLECTOMY  1971    WISDOM TOOTH EXTRACTION  1998     Family History   Problem Relation Name Age of Onset    Mental illness Mother Chey     Hypertension Mother Chey     Diabetes Mother Chey     Peripheral vascular disease Father Sd     Diabetes Father Sd     Hypertension Father Sd     Cancer Father Sd     No Known Problems Sister      No Known Problems Daughter      No Known Problems Son      Diabetes Other fam hx     Heart disease Other fam hx     Stroke Other fam hx     Hypertension Other fam hx     Cancer Other fam hx      Social History     Tobacco Use    Smoking status: Former     Current packs/day: 0.00     Average packs/day: 1 pack/day for 14.7 years (14.7 ttl pk-yrs)     Types: Cigarettes, Cigars     Start date: 1974     Quit date: 1989     Years since quittin.3     Passive exposure: Never    Smokeless tobacco: Former   Substance Use Topics    Alcohol use: Yes     Alcohol/week: 1.0 standard drink of alcohol     Types: 1 Cans of beer per week     Comment: Sometimes 3    Drug use: Never       Physical Exam   ED Triage Vitals [24 0039]   Temperature Heart Rate Respirations BP   36.7 °C (98.1 °F) 95 20 155/72      Pulse Ox Temp Source Heart Rate Source Patient Position   95 % Temporal Monitor Lying      BP Location FiO2 (%)     Left arm --       Physical Exam  Vitals and nursing note reviewed.   HENT:      Head: Normocephalic and atraumatic.      Right Ear: Tympanic membrane and ear canal normal.      Left Ear: Tympanic membrane and ear canal normal.      Nose: Nose normal.      Mouth/Throat:      Mouth: Mucous membranes are moist.   Cardiovascular:      Rate and Rhythm: Normal rate.   Pulmonary:      Effort: Pulmonary effort is normal.      Breath sounds: Normal breath sounds.   Chest:      Chest wall: Tenderness present.      Comments: Right chest is tender over the lower anterolateral rib.  There is no crepitation or subcutaneous air.  Abdominal:      General: Abdomen is  flat.      Palpations: Abdomen is soft.   Musculoskeletal:         General: Normal range of motion.      Cervical back: Normal range of motion.      Right lower leg: Tenderness present.      Comments: There is some tenderness over the right hip.  No deformity.   Skin:     General: Skin is warm and dry.   Neurological:      General: No focal deficit present.      Mental Status: He is alert.         ED Course & MDM   Diagnoses as of 05/28/24 0418   Pneumonia of right lower lobe due to infectious organism   Pain of right hip       Medical Decision Making  EKG interpreted by me: Patient has sinus tachycardia with rate of 101.  His intervals and axes are normal.  No ST changes are present.    Patient had films of his right hip since there was some soreness there and these were negative.  It does mention surgical changes but nothing acute.  Scan of chest failed to show a PE or infiltrate but I feel that there is probably some peripheral changes, especially on the right side where he is having the pleuritic pain.  His white count is 14.3.  He does not look ill and feels all right to go home but he is going to need antibiotics as well as another course of steroid and an inhaler, which she has used numerous times in the past but has none at home currently.  I told him to follow-up with his family doctor next 3 to 4 days if not improving.        Procedure  Procedures     Rmay Ellsworth MD  05/28/24 0051       Ramy Ellsworth MD  05/28/24 0117       Ramy Ellsworth MD  05/28/24 0119       Ramy Ellsworth MD  05/28/24 0227       Ramy Ellsworth MD  05/28/24 0418

## 2024-06-06 ENCOUNTER — ANCILLARY PROCEDURE (OUTPATIENT)
Dept: VASCULAR MEDICINE | Facility: CLINIC | Age: 64
End: 2024-06-06
Payer: COMMERCIAL

## 2024-06-06 DIAGNOSIS — I87.2 VENOUS INSUFFICIENCY OF BOTH LOWER EXTREMITIES: ICD-10-CM

## 2024-06-06 PROCEDURE — 93978 VASCULAR STUDY: CPT | Performed by: INTERNAL MEDICINE

## 2024-06-06 PROCEDURE — 93970 EXTREMITY STUDY: CPT | Performed by: INTERNAL MEDICINE

## 2024-06-06 PROCEDURE — 93970 EXTREMITY STUDY: CPT

## 2024-06-06 PROCEDURE — 93978 VASCULAR STUDY: CPT

## 2024-06-26 DIAGNOSIS — F41.9 ANXIETY: Primary | ICD-10-CM

## 2024-06-26 DIAGNOSIS — Z86.711 HISTORY OF PULMONARY EMBOLISM: ICD-10-CM

## 2024-06-26 RX ORDER — APIXABAN 5 MG/1
TABLET, FILM COATED ORAL
Qty: 60 TABLET | Refills: 3 | Status: SHIPPED | OUTPATIENT
Start: 2024-06-26

## 2024-06-26 RX ORDER — SERTRALINE HYDROCHLORIDE 100 MG/1
100 TABLET, FILM COATED ORAL DAILY
Qty: 30 TABLET | Refills: 3 | Status: SHIPPED | OUTPATIENT
Start: 2024-06-26

## 2024-06-27 ENCOUNTER — TELEMEDICINE (OUTPATIENT)
Dept: VASCULAR SURGERY | Facility: CLINIC | Age: 64
End: 2024-06-27
Payer: COMMERCIAL

## 2024-06-27 DIAGNOSIS — R60.0 LEG EDEMA: ICD-10-CM

## 2024-06-27 DIAGNOSIS — M79.3 LIPODERMATOSCLEROSIS OF BOTH LOWER EXTREMITIES: ICD-10-CM

## 2024-06-27 DIAGNOSIS — R09.89 CORONA PHLEBECTATICA PARAPLANTARIS: ICD-10-CM

## 2024-06-27 DIAGNOSIS — I80.9 PHLEBITIS: ICD-10-CM

## 2024-06-27 DIAGNOSIS — Z95.828 PRESENCE OF IVC FILTER: Primary | ICD-10-CM

## 2024-06-27 PROCEDURE — 99442 PR PHYS/QHP TELEPHONE EVALUATION 11-20 MIN: CPT | Performed by: SURGERY

## 2024-06-27 ASSESSMENT — COLUMBIA-SUICIDE SEVERITY RATING SCALE - C-SSRS
1. IN THE PAST MONTH, HAVE YOU WISHED YOU WERE DEAD OR WISHED YOU COULD GO TO SLEEP AND NOT WAKE UP?: NO
6. HAVE YOU EVER DONE ANYTHING, STARTED TO DO ANYTHING, OR PREPARED TO DO ANYTHING TO END YOUR LIFE?: NO
2. HAVE YOU ACTUALLY HAD ANY THOUGHTS OF KILLING YOURSELF?: NO

## 2024-06-27 ASSESSMENT — PATIENT HEALTH QUESTIONNAIRE - PHQ9
2. FEELING DOWN, DEPRESSED OR HOPELESS: NOT AT ALL
1. LITTLE INTEREST OR PLEASURE IN DOING THINGS: NOT AT ALL
SUM OF ALL RESPONSES TO PHQ9 QUESTIONS 1 AND 2: 0

## 2024-06-27 NOTE — PROGRESS NOTES
F/U REASON: follow up on vascular lab studies    CURRENT ENCOUNTER:  Rome Mullins is 64 y.o. male here for follow up of  follow up on vascular lab studies.    Meds:   Current Outpatient Medications:     acetaminophen (Tylenol) 500 mg tablet, Take 2 tablets (1,000 mg) by mouth every 8 hours if needed., Disp: , Rfl:     albuterol 90 mcg/actuation inhaler, Inhale 1-2 puffs every 6 hours if needed for wheezing or shortness of breath., Disp: 18 g, Rfl: 1    atorvastatin (Lipitor) 10 mg tablet, Take 1 tablet (10 mg) by mouth once daily., Disp: 90 tablet, Rfl: 3    Eliquis 5 mg tablet, Take 1 tablet by mouth twice daily with or without food., Disp: 60 tablet, Rfl: 3    gabapentin (Neurontin) 600 mg tablet, TAKE ONE TABLET BY MOUTH THREE TIMES A DAY, Disp: 270 tablet, Rfl: 0    lisinopril 10 mg tablet, Take 1 tablet (10 mg) by mouth once daily., Disp: , Rfl:     metFORMIN (Glucophage) 500 mg tablet, Take 1 tablet (500 mg) by mouth once daily with breakfast., Disp: 90 tablet, Rfl: 3    metoprolol tartrate (Lopressor) 25 mg tablet, Take 1 tablet (25 mg) by mouth 2 times daily (morning and late afternoon)., Disp: , Rfl:     omeprazole (PriLOSEC) 40 mg DR capsule, Take 1 capsule (40 mg) by mouth once daily., Disp: , Rfl:     sertraline (Zoloft) 100 mg tablet, TAKE ONE TABLET BY MOUTH ONCE EVERY DAY, Disp: 30 tablet, Rfl: 3    sildenafil (Viagra) 100 mg tablet, Take 1 tablet (100 mg) by mouth once daily as needed., Disp: , Rfl:     Allergies:   Allergies   Allergen Reactions    Fentanyl Palpitations and Unknown     rapid heart rate    Duloxetine GI Upset    Venlafaxine GI Upset    Methadone Palpitations       ROS:  Review of Systems  otherwise unremarkable    Labs:  Lab Results   Component Value Date    WBC 14.3 (H) 05/28/2024    WBC 10.7 03/21/2024    WBC 6.9 01/07/2024    HGB 12.8 (L) 05/28/2024    HGB 14.7 03/21/2024    HGB 13.2 (L) 01/07/2024    HCT 38.4 (L) 05/28/2024    HCT 45.3 03/21/2024    HCT 41.0 01/07/2024    MCV  88 05/28/2024    MCV 89 03/21/2024    MCV 90 01/07/2024     (H) 05/28/2024     Lab Results   Component Value Date    CREATININE 1.49 (H) 05/28/2024    CREATININE 0.84 03/21/2024    CREATININE 0.96 01/07/2024    BUN 21 05/28/2024    BUN 19 03/21/2024    BUN 19 01/07/2024     (L) 05/28/2024     (L) 03/21/2024     (L) 01/07/2024    K 3.8 05/28/2024    K 4.1 03/21/2024    K 3.9 01/07/2024     05/28/2024     03/21/2024     01/07/2024    CO2 22 05/28/2024    CO2 24 03/21/2024    CO2 22 01/07/2024         Imaging:  Reviewed VI and abdominal venous duplex. Discussed with patient.     Assessment & Plan:  Rome Mullins is 63 y.o. male with history of cvi, ulceration history.  HTN, HLP, anxiety, MDD, a fib on eliquis, DM, VICENTE not using CPAP   H/o fusion in lumbar region - Now numb from knees to toes - gets shooting pain down to toes depending on how he sits     Has been years with LDS - right worse than left;   Right leg: has had blisters and weeping - last was 3 mos ago; happened about 2 times  Left leg; none     + Clotting d/o   +h/o DVT; left leg in 2002 after 2nd back surgery; had a PE as well; naomy filter was placed.   3/2024 PE after covid;   No fam hx thrombotic history     Recent COVID with hospitalization 1 month at [facility] - discharged with 4L oxygen which uses at night.      RIGHT LEG C4b Lipodermatosclerosis or atrophie raad, C4c Corona Phlebectatica, and C5 Healed ulcer  RIGHT LEG VCSS SCORE: 11  LEFT LEG C4b Lipodermatosclerosis or atrophie raad and C4c Corona Phlebectatica  LEFT LEG VCSS SCORE: 10     Only DVT on the left  LEFT LEG Vilalta total score: 10  Left leg PTS severity: moderate      reviewed vascular studies  Will proceed with axial images for abd/pelvis for IVC filter patency interrogation.     1) I would proceed with a CT of the abdomen /pelvis at [facility] and then a virtual phone visit with me to discuss the results - call Trennece at  below number to set up the follow up visit  2) make sure you hydrate well starting now in preparation for your CT scan    La Cast MD, MHS, RPVI  , [facility] [employer]  Director, Center for Comprehensive Venous Care, [facility] [place]  Co-Director, Vascular Laboratories, [facility] [place]  Division of Vascular Surgery and Endovascular Therapy  [facility]

## 2024-07-08 ENCOUNTER — APPOINTMENT (OUTPATIENT)
Dept: RADIOLOGY | Facility: HOSPITAL | Age: 64
End: 2024-07-08
Payer: COMMERCIAL

## 2024-07-08 ENCOUNTER — LAB (OUTPATIENT)
Dept: LAB | Facility: LAB | Age: 64
End: 2024-07-08
Payer: COMMERCIAL

## 2024-07-08 ENCOUNTER — HOSPITAL ENCOUNTER (EMERGENCY)
Facility: HOSPITAL | Age: 64
Discharge: OTHER INSTITUTION | End: 2024-07-09
Attending: EMERGENCY MEDICINE
Payer: COMMERCIAL

## 2024-07-08 ENCOUNTER — APPOINTMENT (OUTPATIENT)
Dept: CARDIOLOGY | Facility: HOSPITAL | Age: 64
End: 2024-07-08
Payer: COMMERCIAL

## 2024-07-08 DIAGNOSIS — R06.02 SHORTNESS OF BREATH: ICD-10-CM

## 2024-07-08 DIAGNOSIS — J18.9 PNEUMONIA OF RIGHT UPPER LOBE DUE TO INFECTIOUS ORGANISM: Primary | ICD-10-CM

## 2024-07-08 DIAGNOSIS — Z95.828 PRESENCE OF IVC FILTER: ICD-10-CM

## 2024-07-08 DIAGNOSIS — R09.02 HYPOXEMIA: ICD-10-CM

## 2024-07-08 LAB
ALBUMIN SERPL BCP-MCNC: 3.6 G/DL (ref 3.4–5)
ALP SERPL-CCNC: 67 U/L (ref 33–136)
ALT SERPL W P-5'-P-CCNC: 11 U/L (ref 10–52)
ANION GAP SERPL CALC-SCNC: 15 MMOL/L (ref 10–20)
AST SERPL W P-5'-P-CCNC: 16 U/L (ref 9–39)
BASOPHILS # BLD AUTO: 0.04 X10*3/UL (ref 0–0.1)
BASOPHILS NFR BLD AUTO: 0.3 %
BILIRUB SERPL-MCNC: 1 MG/DL (ref 0–1.2)
BNP SERPL-MCNC: 57 PG/ML (ref 0–99)
BUN SERPL-MCNC: 15 MG/DL (ref 6–23)
CALCIUM SERPL-MCNC: 8.9 MG/DL (ref 8.6–10.3)
CARDIAC TROPONIN I PNL SERPL HS: 5 NG/L (ref 0–20)
CARDIAC TROPONIN I PNL SERPL HS: 5 NG/L (ref 0–20)
CHLORIDE SERPL-SCNC: 99 MMOL/L (ref 98–107)
CO2 SERPL-SCNC: 22 MMOL/L (ref 21–32)
CREAT SERPL-MCNC: 0.94 MG/DL (ref 0.5–1.3)
CREAT SERPL-MCNC: 1.07 MG/DL (ref 0.5–1.3)
EGFRCR SERPLBLD CKD-EPI 2021: 78 ML/MIN/1.73M*2
EGFRCR SERPLBLD CKD-EPI 2021: >90 ML/MIN/1.73M*2
EOSINOPHIL # BLD AUTO: 0.03 X10*3/UL (ref 0–0.7)
EOSINOPHIL NFR BLD AUTO: 0.2 %
ERYTHROCYTE [DISTWIDTH] IN BLOOD BY AUTOMATED COUNT: 13.9 % (ref 11.5–14.5)
GLUCOSE SERPL-MCNC: 217 MG/DL (ref 74–99)
HCT VFR BLD AUTO: 38.6 % (ref 41–52)
HGB BLD-MCNC: 12.6 G/DL (ref 13.5–17.5)
IMM GRANULOCYTES # BLD AUTO: 0.11 X10*3/UL (ref 0–0.7)
IMM GRANULOCYTES NFR BLD AUTO: 0.7 % (ref 0–0.9)
LYMPHOCYTES # BLD AUTO: 1.73 X10*3/UL (ref 1.2–4.8)
LYMPHOCYTES NFR BLD AUTO: 11.4 %
MCH RBC QN AUTO: 29.2 PG (ref 26–34)
MCHC RBC AUTO-ENTMCNC: 32.6 G/DL (ref 32–36)
MCV RBC AUTO: 89 FL (ref 80–100)
MONOCYTES # BLD AUTO: 1.43 X10*3/UL (ref 0.1–1)
MONOCYTES NFR BLD AUTO: 9.4 %
NEUTROPHILS # BLD AUTO: 11.8 X10*3/UL (ref 1.2–7.7)
NEUTROPHILS NFR BLD AUTO: 78 %
NRBC BLD-RTO: 0 /100 WBCS (ref 0–0)
PLATELET # BLD AUTO: 321 X10*3/UL (ref 150–450)
POTASSIUM SERPL-SCNC: 3.9 MMOL/L (ref 3.5–5.3)
PROT SERPL-MCNC: 7.3 G/DL (ref 6.4–8.2)
RBC # BLD AUTO: 4.32 X10*6/UL (ref 4.5–5.9)
RSV RNA RESP QL NAA+PROBE: NOT DETECTED
SARS-COV-2 RNA RESP QL NAA+PROBE: NOT DETECTED
SODIUM SERPL-SCNC: 132 MMOL/L (ref 136–145)
WBC # BLD AUTO: 15.1 X10*3/UL (ref 4.4–11.3)

## 2024-07-08 PROCEDURE — 96365 THER/PROPH/DIAG IV INF INIT: CPT

## 2024-07-08 PROCEDURE — 71045 X-RAY EXAM CHEST 1 VIEW: CPT

## 2024-07-08 PROCEDURE — 71045 X-RAY EXAM CHEST 1 VIEW: CPT | Performed by: STUDENT IN AN ORGANIZED HEALTH CARE EDUCATION/TRAINING PROGRAM

## 2024-07-08 PROCEDURE — 87040 BLOOD CULTURE FOR BACTERIA: CPT | Mod: GENLAB | Performed by: EMERGENCY MEDICINE

## 2024-07-08 PROCEDURE — 85025 COMPLETE CBC W/AUTO DIFF WBC: CPT | Performed by: EMERGENCY MEDICINE

## 2024-07-08 PROCEDURE — 2500000005 HC RX 250 GENERAL PHARMACY W/O HCPCS: Performed by: EMERGENCY MEDICINE

## 2024-07-08 PROCEDURE — 9420000001 HC RT PATIENT EDUCATION 5 MIN

## 2024-07-08 PROCEDURE — 84155 ASSAY OF PROTEIN SERUM: CPT | Performed by: EMERGENCY MEDICINE

## 2024-07-08 PROCEDURE — 87634 RSV DNA/RNA AMP PROBE: CPT | Performed by: EMERGENCY MEDICINE

## 2024-07-08 PROCEDURE — 94640 AIRWAY INHALATION TREATMENT: CPT | Mod: 76

## 2024-07-08 PROCEDURE — 84484 ASSAY OF TROPONIN QUANT: CPT | Performed by: EMERGENCY MEDICINE

## 2024-07-08 PROCEDURE — 94640 AIRWAY INHALATION TREATMENT: CPT

## 2024-07-08 PROCEDURE — 2550000001 HC RX 255 CONTRASTS: Performed by: EMERGENCY MEDICINE

## 2024-07-08 PROCEDURE — 87635 SARS-COV-2 COVID-19 AMP PRB: CPT | Performed by: EMERGENCY MEDICINE

## 2024-07-08 PROCEDURE — 2500000004 HC RX 250 GENERAL PHARMACY W/ HCPCS (ALT 636 FOR OP/ED): Performed by: EMERGENCY MEDICINE

## 2024-07-08 PROCEDURE — 2500000002 HC RX 250 W HCPCS SELF ADMINISTERED DRUGS (ALT 637 FOR MEDICARE OP, ALT 636 FOR OP/ED): Performed by: EMERGENCY MEDICINE

## 2024-07-08 PROCEDURE — 2500000004 HC RX 250 GENERAL PHARMACY W/ HCPCS (ALT 636 FOR OP/ED)

## 2024-07-08 PROCEDURE — 36415 COLL VENOUS BLD VENIPUNCTURE: CPT | Performed by: EMERGENCY MEDICINE

## 2024-07-08 PROCEDURE — 71275 CT ANGIOGRAPHY CHEST: CPT

## 2024-07-08 PROCEDURE — 83880 ASSAY OF NATRIURETIC PEPTIDE: CPT | Performed by: EMERGENCY MEDICINE

## 2024-07-08 PROCEDURE — 71275 CT ANGIOGRAPHY CHEST: CPT | Performed by: STUDENT IN AN ORGANIZED HEALTH CARE EDUCATION/TRAINING PROGRAM

## 2024-07-08 PROCEDURE — 93005 ELECTROCARDIOGRAM TRACING: CPT

## 2024-07-08 PROCEDURE — 94664 DEMO&/EVAL PT USE INHALER: CPT | Mod: 59

## 2024-07-08 PROCEDURE — 99285 EMERGENCY DEPT VISIT HI MDM: CPT | Mod: 25

## 2024-07-08 RX ORDER — IPRATROPIUM BROMIDE AND ALBUTEROL SULFATE 2.5; .5 MG/3ML; MG/3ML
3 SOLUTION RESPIRATORY (INHALATION) ONCE
Status: COMPLETED | OUTPATIENT
Start: 2024-07-08 | End: 2024-07-08

## 2024-07-08 RX ORDER — AZITHROMYCIN 100 MG/ML
INJECTION, POWDER, LYOPHILIZED, FOR SOLUTION INTRAVENOUS
Status: COMPLETED
Start: 2024-07-08 | End: 2024-07-08

## 2024-07-08 RX ORDER — CEFTRIAXONE 2 G/50ML
2 INJECTION, SOLUTION INTRAVENOUS ONCE
Status: COMPLETED | OUTPATIENT
Start: 2024-07-08 | End: 2024-07-08

## 2024-07-08 RX ORDER — ACETAMINOPHEN 325 MG/1
975 TABLET ORAL ONCE
Status: COMPLETED | OUTPATIENT
Start: 2024-07-08 | End: 2024-07-08

## 2024-07-08 RX ORDER — IPRATROPIUM BROMIDE AND ALBUTEROL SULFATE 2.5; .5 MG/3ML; MG/3ML
3 SOLUTION RESPIRATORY (INHALATION)
Status: DISCONTINUED | OUTPATIENT
Start: 2024-07-09 | End: 2024-07-08

## 2024-07-08 ASSESSMENT — PAIN - FUNCTIONAL ASSESSMENT
PAIN_FUNCTIONAL_ASSESSMENT: 0-10
PAIN_FUNCTIONAL_ASSESSMENT: 0-10

## 2024-07-08 ASSESSMENT — PAIN SCALES - GENERAL
PAINLEVEL_OUTOF10: 7
PAINLEVEL_OUTOF10: 8

## 2024-07-08 ASSESSMENT — PAIN DESCRIPTION - PAIN TYPE: TYPE: CHRONIC PAIN;ACUTE PAIN

## 2024-07-08 ASSESSMENT — PAIN DESCRIPTION - LOCATION: LOCATION: HEAD

## 2024-07-08 NOTE — ED TRIAGE NOTES
Patient on oxygen at night at home. States pulse ox was in 80s at home, c/o sob, headache and right ear pain.

## 2024-07-09 ENCOUNTER — HOSPITAL ENCOUNTER (OUTPATIENT)
Facility: HOSPITAL | Age: 64
Setting detail: OBSERVATION
Discharge: HOME | End: 2024-07-10
Attending: STUDENT IN AN ORGANIZED HEALTH CARE EDUCATION/TRAINING PROGRAM | Admitting: NURSE PRACTITIONER
Payer: COMMERCIAL

## 2024-07-09 ENCOUNTER — HOSPITAL ENCOUNTER (OUTPATIENT)
Dept: CARDIOLOGY | Facility: HOSPITAL | Age: 64
Discharge: HOME | End: 2024-07-09
Payer: COMMERCIAL

## 2024-07-09 VITALS
DIASTOLIC BLOOD PRESSURE: 72 MMHG | HEART RATE: 91 BPM | HEIGHT: 73 IN | RESPIRATION RATE: 19 BRPM | TEMPERATURE: 101 F | WEIGHT: 290 LBS | OXYGEN SATURATION: 95 % | BODY MASS INDEX: 38.43 KG/M2 | SYSTOLIC BLOOD PRESSURE: 150 MMHG

## 2024-07-09 DIAGNOSIS — J18.9 PNEUMONIA OF RIGHT UPPER LOBE DUE TO INFECTIOUS ORGANISM: Primary | ICD-10-CM

## 2024-07-09 PROBLEM — J18.8 OTHER PNEUMONIA, UNSPECIFIED ORGANISM: Status: ACTIVE | Noted: 2024-07-09

## 2024-07-09 LAB
GLUCOSE BLD MANUAL STRIP-MCNC: 142 MG/DL (ref 74–99)
GLUCOSE BLD MANUAL STRIP-MCNC: 188 MG/DL (ref 74–99)
HOLD SPECIMEN: NORMAL

## 2024-07-09 PROCEDURE — 2500000005 HC RX 250 GENERAL PHARMACY W/O HCPCS: Performed by: STUDENT IN AN ORGANIZED HEALTH CARE EDUCATION/TRAINING PROGRAM

## 2024-07-09 PROCEDURE — 82947 ASSAY GLUCOSE BLOOD QUANT: CPT

## 2024-07-09 PROCEDURE — 99222 1ST HOSP IP/OBS MODERATE 55: CPT | Performed by: STUDENT IN AN ORGANIZED HEALTH CARE EDUCATION/TRAINING PROGRAM

## 2024-07-09 PROCEDURE — 83036 HEMOGLOBIN GLYCOSYLATED A1C: CPT | Mod: CONLAB | Performed by: NURSE PRACTITIONER

## 2024-07-09 PROCEDURE — 94760 N-INVAS EAR/PLS OXIMETRY 1: CPT

## 2024-07-09 PROCEDURE — 2500000002 HC RX 250 W HCPCS SELF ADMINISTERED DRUGS (ALT 637 FOR MEDICARE OP, ALT 636 FOR OP/ED): Performed by: NURSE PRACTITIONER

## 2024-07-09 PROCEDURE — 93005 ELECTROCARDIOGRAM TRACING: CPT

## 2024-07-09 PROCEDURE — 36415 COLL VENOUS BLD VENIPUNCTURE: CPT | Performed by: NURSE PRACTITIONER

## 2024-07-09 PROCEDURE — 96365 THER/PROPH/DIAG IV INF INIT: CPT

## 2024-07-09 PROCEDURE — 2500000001 HC RX 250 WO HCPCS SELF ADMINISTERED DRUGS (ALT 637 FOR MEDICARE OP): Performed by: NURSE PRACTITIONER

## 2024-07-09 PROCEDURE — 2500000001 HC RX 250 WO HCPCS SELF ADMINISTERED DRUGS (ALT 637 FOR MEDICARE OP): Performed by: EMERGENCY MEDICINE

## 2024-07-09 PROCEDURE — 94640 AIRWAY INHALATION TREATMENT: CPT

## 2024-07-09 PROCEDURE — G0378 HOSPITAL OBSERVATION PER HR: HCPCS

## 2024-07-09 PROCEDURE — 2500000004 HC RX 250 GENERAL PHARMACY W/ HCPCS (ALT 636 FOR OP/ED): Performed by: STUDENT IN AN ORGANIZED HEALTH CARE EDUCATION/TRAINING PROGRAM

## 2024-07-09 PROCEDURE — 2500000004 HC RX 250 GENERAL PHARMACY W/ HCPCS (ALT 636 FOR OP/ED): Performed by: NURSE PRACTITIONER

## 2024-07-09 PROCEDURE — 82947 ASSAY GLUCOSE BLOOD QUANT: CPT | Mod: 91

## 2024-07-09 RX ORDER — LORATADINE 10 MG/1
10 TABLET ORAL DAILY
Status: DISCONTINUED | OUTPATIENT
Start: 2024-07-09 | End: 2024-07-10 | Stop reason: HOSPADM

## 2024-07-09 RX ORDER — INSULIN LISPRO 100 [IU]/ML
0-15 INJECTION, SOLUTION INTRAVENOUS; SUBCUTANEOUS
Status: DISCONTINUED | OUTPATIENT
Start: 2024-07-09 | End: 2024-07-10 | Stop reason: HOSPADM

## 2024-07-09 RX ORDER — ACETAMINOPHEN 325 MG/1
TABLET ORAL
Status: COMPLETED
Start: 2024-07-09 | End: 2024-07-09

## 2024-07-09 RX ORDER — SERTRALINE HYDROCHLORIDE 50 MG/1
100 TABLET, FILM COATED ORAL DAILY
Status: DISCONTINUED | OUTPATIENT
Start: 2024-07-09 | End: 2024-07-10 | Stop reason: HOSPADM

## 2024-07-09 RX ORDER — OXYCODONE HYDROCHLORIDE 5 MG/1
5 TABLET ORAL ONCE
Status: COMPLETED | OUTPATIENT
Start: 2024-07-09 | End: 2024-07-09

## 2024-07-09 RX ORDER — ACETAMINOPHEN 325 MG/1
650 TABLET ORAL EVERY 4 HOURS PRN
Status: DISCONTINUED | OUTPATIENT
Start: 2024-07-09 | End: 2024-07-10 | Stop reason: HOSPADM

## 2024-07-09 RX ORDER — PANTOPRAZOLE SODIUM 40 MG/1
40 TABLET, DELAYED RELEASE ORAL
Status: DISCONTINUED | OUTPATIENT
Start: 2024-07-10 | End: 2024-07-10 | Stop reason: HOSPADM

## 2024-07-09 RX ORDER — ACETAMINOPHEN 160 MG/5ML
650 SOLUTION ORAL EVERY 4 HOURS PRN
Status: DISCONTINUED | OUTPATIENT
Start: 2024-07-09 | End: 2024-07-09

## 2024-07-09 RX ORDER — OXYCODONE AND ACETAMINOPHEN 5; 325 MG/1; MG/1
1 TABLET ORAL EVERY 6 HOURS PRN
Status: DISCONTINUED | OUTPATIENT
Start: 2024-07-09 | End: 2024-07-10 | Stop reason: HOSPADM

## 2024-07-09 RX ORDER — GABAPENTIN 300 MG/1
600 CAPSULE ORAL EVERY 8 HOURS SCHEDULED
Status: DISCONTINUED | OUTPATIENT
Start: 2024-07-09 | End: 2024-07-09 | Stop reason: HOSPADM

## 2024-07-09 RX ORDER — POLYETHYLENE GLYCOL 3350 17 G/17G
17 POWDER, FOR SOLUTION ORAL DAILY PRN
Status: DISCONTINUED | OUTPATIENT
Start: 2024-07-09 | End: 2024-07-10 | Stop reason: HOSPADM

## 2024-07-09 RX ORDER — GUAIFENESIN/DEXTROMETHORPHAN 100-10MG/5
10 SYRUP ORAL EVERY 4 HOURS PRN
Status: DISCONTINUED | OUTPATIENT
Start: 2024-07-09 | End: 2024-07-10 | Stop reason: HOSPADM

## 2024-07-09 RX ORDER — ATORVASTATIN CALCIUM 10 MG/1
10 TABLET, FILM COATED ORAL DAILY
Status: DISCONTINUED | OUTPATIENT
Start: 2024-07-09 | End: 2024-07-10 | Stop reason: HOSPADM

## 2024-07-09 RX ORDER — ACETAMINOPHEN 325 MG/1
975 TABLET ORAL ONCE
Status: COMPLETED | OUTPATIENT
Start: 2024-07-09 | End: 2024-07-09

## 2024-07-09 RX ORDER — ACETAMINOPHEN 160 MG/5ML
650 SOLUTION ORAL EVERY 4 HOURS PRN
Status: DISCONTINUED | OUTPATIENT
Start: 2024-07-09 | End: 2024-07-10 | Stop reason: HOSPADM

## 2024-07-09 RX ORDER — GABAPENTIN 300 MG/1
600 CAPSULE ORAL 3 TIMES DAILY
Status: DISCONTINUED | OUTPATIENT
Start: 2024-07-09 | End: 2024-07-10 | Stop reason: HOSPADM

## 2024-07-09 RX ORDER — LISINOPRIL 10 MG/1
10 TABLET ORAL DAILY
Status: DISCONTINUED | OUTPATIENT
Start: 2024-07-09 | End: 2024-07-10 | Stop reason: HOSPADM

## 2024-07-09 RX ORDER — METOPROLOL TARTRATE 25 MG/1
25 TABLET, FILM COATED ORAL
Status: DISCONTINUED | OUTPATIENT
Start: 2024-07-09 | End: 2024-07-10 | Stop reason: HOSPADM

## 2024-07-09 RX ORDER — DEXTROSE 50 % IN WATER (D50W) INTRAVENOUS SYRINGE
12.5
Status: DISCONTINUED | OUTPATIENT
Start: 2024-07-09 | End: 2024-07-10 | Stop reason: HOSPADM

## 2024-07-09 RX ORDER — IPRATROPIUM BROMIDE AND ALBUTEROL SULFATE 2.5; .5 MG/3ML; MG/3ML
3 SOLUTION RESPIRATORY (INHALATION)
Status: DISCONTINUED | OUTPATIENT
Start: 2024-07-09 | End: 2024-07-10 | Stop reason: HOSPADM

## 2024-07-09 RX ORDER — ACETAMINOPHEN 325 MG/1
650 TABLET ORAL EVERY 4 HOURS PRN
Status: DISCONTINUED | OUTPATIENT
Start: 2024-07-09 | End: 2024-07-09

## 2024-07-09 RX ORDER — DEXTROSE 50 % IN WATER (D50W) INTRAVENOUS SYRINGE
25
Status: DISCONTINUED | OUTPATIENT
Start: 2024-07-09 | End: 2024-07-10 | Stop reason: HOSPADM

## 2024-07-09 SDOH — SOCIAL STABILITY: SOCIAL INSECURITY: ARE YOU OR HAVE YOU BEEN THREATENED OR ABUSED PHYSICALLY, EMOTIONALLY, OR SEXUALLY BY ANYONE?: NO

## 2024-07-09 SDOH — SOCIAL STABILITY: SOCIAL INSECURITY: ARE THERE ANY APPARENT SIGNS OF INJURIES/BEHAVIORS THAT COULD BE RELATED TO ABUSE/NEGLECT?: NO

## 2024-07-09 SDOH — SOCIAL STABILITY: SOCIAL INSECURITY: HAVE YOU HAD THOUGHTS OF HARMING ANYONE ELSE?: NO

## 2024-07-09 SDOH — SOCIAL STABILITY: SOCIAL INSECURITY: ABUSE: ADULT

## 2024-07-09 SDOH — SOCIAL STABILITY: SOCIAL INSECURITY: HAS ANYONE EVER THREATENED TO HURT YOUR FAMILY OR YOUR PETS?: NO

## 2024-07-09 SDOH — SOCIAL STABILITY: SOCIAL INSECURITY: DO YOU FEEL ANYONE HAS EXPLOITED OR TAKEN ADVANTAGE OF YOU FINANCIALLY OR OF YOUR PERSONAL PROPERTY?: NO

## 2024-07-09 SDOH — SOCIAL STABILITY: SOCIAL INSECURITY: DOES ANYONE TRY TO KEEP YOU FROM HAVING/CONTACTING OTHER FRIENDS OR DOING THINGS OUTSIDE YOUR HOME?: NO

## 2024-07-09 SDOH — SOCIAL STABILITY: SOCIAL INSECURITY: DO YOU FEEL UNSAFE GOING BACK TO THE PLACE WHERE YOU ARE LIVING?: NO

## 2024-07-09 SDOH — SOCIAL STABILITY: SOCIAL INSECURITY: WERE YOU ABLE TO COMPLETE ALL THE BEHAVIORAL HEALTH SCREENINGS?: YES

## 2024-07-09 SDOH — SOCIAL STABILITY: SOCIAL INSECURITY: HAVE YOU HAD ANY THOUGHTS OF HARMING ANYONE ELSE?: NO

## 2024-07-09 ASSESSMENT — ACTIVITIES OF DAILY LIVING (ADL)
JUDGMENT_ADEQUATE_SAFELY_COMPLETE_DAILY_ACTIVITIES: YES
BATHING: INDEPENDENT
DRESSING YOURSELF: INDEPENDENT
PATIENT'S MEMORY ADEQUATE TO SAFELY COMPLETE DAILY ACTIVITIES?: YES
TOILETING: INDEPENDENT
WALKS IN HOME: INDEPENDENT
HEARING - RIGHT EAR: FUNCTIONAL
ADEQUATE_TO_COMPLETE_ADL: YES
FEEDING YOURSELF: INDEPENDENT
LACK_OF_TRANSPORTATION: NO
HEARING - LEFT EAR: FUNCTIONAL
GROOMING: INDEPENDENT

## 2024-07-09 ASSESSMENT — COGNITIVE AND FUNCTIONAL STATUS - GENERAL
MOBILITY SCORE: 24
DAILY ACTIVITIY SCORE: 24
DAILY ACTIVITIY SCORE: 24
MOBILITY SCORE: 24
PATIENT BASELINE BEDBOUND: NO

## 2024-07-09 ASSESSMENT — PAIN SCALES - GENERAL
PAINLEVEL_OUTOF10: 9
PAINLEVEL_OUTOF10: 3
PAINLEVEL_OUTOF10: 5 - MODERATE PAIN
PAINLEVEL_OUTOF10: 7
PAINLEVEL_OUTOF10: 4
PAINLEVEL_OUTOF10: 5 - MODERATE PAIN
PAINLEVEL_OUTOF10: 9

## 2024-07-09 ASSESSMENT — PAIN DESCRIPTION - LOCATION
LOCATION: SHOULDER
LOCATION: SHOULDER

## 2024-07-09 ASSESSMENT — PATIENT HEALTH QUESTIONNAIRE - PHQ9
2. FEELING DOWN, DEPRESSED OR HOPELESS: NOT AT ALL
SUM OF ALL RESPONSES TO PHQ9 QUESTIONS 1 & 2: 0
1. LITTLE INTEREST OR PLEASURE IN DOING THINGS: NOT AT ALL

## 2024-07-09 ASSESSMENT — PAIN DESCRIPTION - DESCRIPTORS: DESCRIPTORS: ACHING

## 2024-07-09 ASSESSMENT — LIFESTYLE VARIABLES
HOW MANY STANDARD DRINKS CONTAINING ALCOHOL DO YOU HAVE ON A TYPICAL DAY: 1 OR 2
AUDIT-C TOTAL SCORE: 2
HOW OFTEN DO YOU HAVE 6 OR MORE DRINKS ON ONE OCCASION: NEVER
SKIP TO QUESTIONS 9-10: 1
HOW OFTEN DO YOU HAVE A DRINK CONTAINING ALCOHOL: 2-4 TIMES A MONTH
AUDIT-C TOTAL SCORE: 2

## 2024-07-09 ASSESSMENT — PAIN - FUNCTIONAL ASSESSMENT
PAIN_FUNCTIONAL_ASSESSMENT: 0-10
PAIN_FUNCTIONAL_ASSESSMENT: 0-10

## 2024-07-09 ASSESSMENT — PAIN DESCRIPTION - ORIENTATION
ORIENTATION: RIGHT
ORIENTATION: RIGHT

## 2024-07-09 ASSESSMENT — PAIN SCALES - WONG BAKER: WONGBAKER_NUMERICALRESPONSE: HURTS WORST

## 2024-07-09 NOTE — CARE PLAN
The patient was sent here this morning from [place]'s ED. He has been tolerating room air since arrival. Medicated for pain today for his shoulder and ear drops given. Patient ambulates well around the room.

## 2024-07-09 NOTE — H&P
History of Present Illness  Rome Mullins is a 64 y.o. male  with PMHx significant for long-haul covid, chronic respiratory failure with hypoxia and requires CPAP with oxygen bleed of 2 liters at night,  atrial fibrillation with naomy filter, PE, DVT, PVD, long-term anticoagulation with eliquis, DM II with hyperglycemia, chronic pain and has had multiple back surgeries and shoulder surgeries who presented to [facility] with transfer to [facility] due to sob cough, headache, beginning last night. Symptoms including increased cough at home with minimal secretions, fever > 101, wheezing, conversational dyspnea, increased oxygen needs at home. He has chills and generalized body aches. He expresses he has a headache since last night with feelings of fullness in the bilateral ears with right worse than left without changes in his hearing. He has nasal drip that is copious at times and every time he blows his nose. CT chest reveals RUL consolidation consistent with PNA. Leukocytosis 15.1 with left shift 11.80. He was administered rocephin and azithromycin at [place] and subsequently transferred to [place] 2/2 bed availability.     12 Point ROS negative unless noted in above HPI       Past Medical History  Past Medical History:   Diagnosis Date    Allergic     Anxiety     Arthritis 09/01/2023    Clotting disorder (Multi)     Depression     Difficulty walking 2003    HL (hearing loss) 05/01/2023    Hypertension 2000?    Urinary tract infection 01/01/2015       Surgical History  Past Surgical History:   Procedure Laterality Date    BACK SURGERY  01/01/2002    CHOLECYSTECTOMY  01/01/2008    HERNIA REPAIR  01/01/2011    JOINT REPLACEMENT  01/01/2010    SPINE SURGERY  01/01/2002    TOENAIL EXCISION  2019    TONSILLECTOMY  01/01/1971    WISDOM TOOTH EXTRACTION  01/01/1998        Social History  He reports that he quit smoking about 35 years ago. His smoking use included cigarettes and cigars. He started smoking about 50  "years ago. He has a 14.7 pack-year smoking history. He has never been exposed to tobacco smoke. He has quit using smokeless tobacco. He reports current alcohol use of about 1.0 standard drink of alcohol per week. He reports that he does not use drugs.    Allergies  Fentanyl, Duloxetine, Venlafaxine, and Methadone     Physical Exam  Constitutional:       Appearance: He is obese.   HENT:      Head: Atraumatic.      Comments: Tenderness maxillary area on palpation.  Bilateral occlusive ear wax.   Right ear canal reddened.  Cardiovascular:      Rate and Rhythm: Normal rate and regular rhythm.      Pulses: Normal pulses.   Pulmonary:      Breath sounds: Normal breath sounds.      Comments: Intermittent gulping breath with conversation  Dry cough  Abdominal:      General: Bowel sounds are normal.      Palpations: Abdomen is soft.   Musculoskeletal:         General: Normal range of motion.      Cervical back: Normal range of motion.   Skin:     General: Skin is warm and dry.      Capillary Refill: Capillary refill takes less than 2 seconds.   Neurological:      General: No focal deficit present.      Mental Status: He is alert and oriented to person, place, and time.   Psychiatric:         Mood and Affect: Mood normal.         Behavior: Behavior normal.          Last Recorded Vitals  Blood pressure 121/62, pulse 73, temperature 37.3 °C (99.2 °F), temperature source Temporal, resp. rate 18, height 1.86 m (6' 1.23\"), weight 127 kg (279 lb 5.2 oz), SpO2 92%.    Relevant Results  Scheduled medications  ampicillin-sulbactam, 3 g, intravenous, q6h  apixaban, 5 mg, oral, BID  atorvastatin, 10 mg, oral, Daily  carbamide peroxide, 5 drop, Each Ear, BID  gabapentin, 600 mg, oral, TID  insulin lispro, 0-15 Units, subcutaneous, TID  ipratropium-albuteroL, 3 mL, nebulization, q6h while awake  lisinopril, 10 mg, oral, Daily  loratadine, 10 mg, oral, Daily  metoprolol tartrate, 25 mg, oral, BID  [START ON 7/10/2024] pantoprazole, 40 mg, " oral, Daily before breakfast  sertraline, 100 mg, oral, Daily      Continuous medications     PRN medications  PRN medications: acetaminophen **OR** acetaminophen, dextromethorphan-guaifenesin, dextrose, dextrose, glucagon, glucagon, oxyCODONE-acetaminophen, polyethylene glycol     Results for orders placed or performed during the hospital encounter of 07/09/24 (from the past 24 hour(s))   Light Blue Top   Result Value Ref Range    Extra Tube Hold for add-ons.    Red Top   Result Value Ref Range    Extra Tube Hold for add-ons.    Green Top   Result Value Ref Range    Extra Tube Hold for add-ons.    Gray Top   Result Value Ref Range    Extra Tube Hold for add-ons.         Imaging  ECG 12 lead    Result Date: 7/9/2024  Normal sinus rhythm Left axis deviation Abnormal ECG When compared with ECG of 08-JUL-2024 19:24, (unconfirmed) No significant change was found    ECG 12 lead    Result Date: 7/9/2024  Normal sinus rhythm Normal ECG When compared with ECG of 28-MAY-2024 00:33, No significant change was found    CT angio chest for pulmonary embolism    Result Date: 7/8/2024  Interpreted By:  Fredis Cisneros, STUDY: CT ANGIO CHEST FOR PULMONARY EMBOLISM;  7/8/2024 10:45 pm   INDICATION: Signs/Symptoms:sob/cough.   COMPARISON: Chest CT 05/28/2024   ACCESSION NUMBER(S): [id number]   ORDERING CLINICIAN: ANDREA WALKER   TECHNIQUE: Axial CT images of the chest obtained  after intravenous administration of intravenous contrast.. Maximum intensity projection images were created and reviewed.   FINDINGS: VESSELS: No aortic aneurysm. No evidence of pulmonary embolism. Pulmonary artery diameter of 3 cm, upper limit of normal. HEART: Normal size. Mild coronary atherosclerosis. No pericardial effusion. MEDIASTINUM AND PATTI: No pathologically enlarged lymph nodes. LUNG, PLEURA, AND LARGE AIRWAYS: No pleural effusion or pneumothorax. New right upper lobe consolidation (401/85). Scattered areas of air trapping. CHEST WALL AND LOWER  NECK: Within normal limits.   UPPER ABDOMEN: Punctate pancreatic head parenchymal calcification, likely sequela of remote pancreatitis. Prior cholecystectomy.   BONES: No acute osseous abnormality. Minimally visualized cervical spine fixation hardware.       Right upper lobe consolidation favoring pneumonia. Recommend follow-up with chest or radiograph in 3 months to ensure resolution.   No evidence of pulmonary embolism.   MACRO: None   Signed by: Fredis Cisneros 7/8/2024 11:47 PM Dictation workstation:   [id number]    XR chest 1 view    Result Date: 7/8/2024  Interpreted By:  Jose Michel, STUDY: XR CHEST 1 VIEW;  7/8/2024 9:37 pm   INDICATION: Signs/Symptoms:sob.   COMPARISON: Chest radiograph 11/15/2021. Chest CT 05/28/2024.   ACCESSION NUMBER(S): [id number]   ORDERING CLINICIAN: ANDREA WALKER   FINDINGS:   CARDIOMEDIASTINAL SILHOUETTE: Cardiomediastinal silhouette is normal in size and configuration.   LUNGS/PLEURA: There is a focal consolidation in the right upper lobe which appears new from recent prior concerning for pneumonia.There are no pleural effusions. There is no demonstrated pneumothorax     BONES: No evidence of acute osseous abnormality.       1. New consolidation in the right upper lobe concerning for pneumonia.     Signed by: Jose Michel 7/8/2024 10:31 PM Dictation workstation:   [id number]      Assessment/Plan    #PNA  #Chronic resp failure with hypoxia 2/2 COVID 2022  #Otitis media    * baseline HS CPAP with two liters    * cough minimal secretions, increased sob, wheeze, sinus pressure, ear pressure, headache, fever    *right ear canal reddened, maxillary tenderness  ~CT chest reveals RUL consolidation consistent with PNA  ~Leukocytosis 15.1 with left shift 11.80  ~rocephin and azithromycin at [place]  Unasyn 3GM Q6H and transition to Augmentin on discharge  Duoneb Q6H WA  Initiate loratadine for chronic nasal drainage  Debrox drops bilateral ears X 4 days    #Atrial fibrillation  #Hx  DVT and PE  #PVD  #HTN, HLD  ~long-term anticoagulation with Eliquis  ~continue protonix for GI protection  ~continue lisinopril, metoprolol, and atorvastatin  *monitor renal function routinely    #Diabetes type II with hyperglycemia and peripheral neuropathy  ~hold metformin while inpatient in the event CT with contrast needed  ~humalog insulin sliding scale  ~continue gabapentin    #Chronic pain  ~Hx multiple orthopedic surgeries  ~Percocet Q6H PRN pain    Disposition: Admitted 2/2 SOB 2/2 CAP PNA RUL and otitis media. Plan for discharge in the am with Augmentin.    PEE Brooks  Internal Medicine    Patient was seen in collaboration with the [facility], Shea GLASGOW. I was present for the pertinent components of the history, physical, and medical decision making and independently examined the patient.     I spent a total of 60 minutes on the date of the service which included preparing to see the patient, face-to-face patient care, completing clinical documentation, obtaining and/or reviewing separately obtained history, performing a medically appropriate examination, counseling and educating the patient/family/caregiver, ordering medications, tests, or procedures, independently interpreting results (not separately reported), and communicating results to the patient/family/caregiver.     Mike Guadalupe, DO  Internal Medicine

## 2024-07-09 NOTE — ED PROVIDER NOTES
Department of Emergency Medicine   ED  Provider Note  Admit Date/RoomTime: 7/8/2024  7:29 PM  ED Room[facility]                  History of Present Illness:   Rome Mullins is a 64 y.o. male presenting to the ED for sob cough, headache, beginning last night.  The complaint has been persistent, moderate in severity, and worsened by nothing.  States he had a fever at home.  Cough that has been minimally productive.  Fever at home 102.   Complains of some wheezing.  No leg pain swelling or calf tenderness.  No recent travel or known ill contacts.      Review of Systems:   Pertinent positives and review of systems as noted above.  Remaining 10 review of systems is negative or noncontributory to today's episode of care.  Review of Systems   A complete review of systems is otherwise negative except as noted above    --------------------------------------------- PAST HISTORY ---------------------------------------------  Past Medical History:  has a past medical history of Allergic, Anxiety, Arthritis (09/01/2023), Clotting disorder (Multi), Depression, Difficulty walking (2003), HL (hearing loss) (05/01/2023), Hypertension (2000?), and Urinary tract infection (01/01/2015).    Past Surgical History:  has a past surgical history that includes Toenail excision (2019); Back surgery (01/01/2002); Cholecystectomy (01/01/2008); Hernia repair (01/01/2011); Joint replacement (01/01/2010); Spine surgery (01/01/2002); Tonsillectomy (01/01/1971); and [place] tooth extraction (01/01/1998).    Social History:  reports that he quit smoking about 35 years ago. His smoking use included cigarettes and cigars. He started smoking about 50 years ago. He has a 14.7 pack-year smoking history. He has never been exposed to tobacco smoke. He has quit using smokeless tobacco. He reports current alcohol use of about 1.0 standard drink of alcohol per week. He reports that he does not use drugs.    Family History: family history includes Cancer in  his father and another family member; Diabetes in his father, mother, and another family member; Heart disease in an other family member; Hypertension in his father, mother, and another family member; Mental illness in his mother; No Known Problems in his daughter, sister, and son; Peripheral vascular disease in his father; Stroke in an other family member. Unless otherwise noted, family history is non contributory    Patient's Medications   New Prescriptions    No medications on file   Previous Medications    ACETAMINOPHEN (TYLENOL) 500 MG TABLET    Take 2 tablets (1,000 mg) by mouth every 8 hours if needed.    ALBUTEROL 90 MCG/ACTUATION INHALER    Inhale 1-2 puffs every 6 hours if needed for wheezing or shortness of breath.    ATORVASTATIN (LIPITOR) 10 MG TABLET    Take 1 tablet (10 mg) by mouth once daily.    ELIQUIS 5 MG TABLET    Take 1 tablet by mouth twice daily with or without food.    GABAPENTIN (NEURONTIN) 600 MG TABLET    TAKE ONE TABLET BY MOUTH THREE TIMES A DAY    LISINOPRIL 10 MG TABLET    Take 1 tablet (10 mg) by mouth once daily.    METFORMIN (GLUCOPHAGE) 500 MG TABLET    Take 1 tablet (500 mg) by mouth once daily with breakfast.    METOPROLOL TARTRATE (LOPRESSOR) 25 MG TABLET    Take 1 tablet (25 mg) by mouth 2 times daily (morning and late afternoon).    OMEPRAZOLE (PRILOSEC) 40 MG DR CAPSULE    Take 1 capsule (40 mg) by mouth once daily.    SERTRALINE (ZOLOFT) 100 MG TABLET    TAKE ONE TABLET BY MOUTH ONCE EVERY DAY    SILDENAFIL (VIAGRA) 100 MG TABLET    Take 1 tablet (100 mg) by mouth once daily as needed.   Modified Medications    No medications on file   Discontinued Medications    No medications on file      The patient’s home medications have been reviewed.    Allergies: Fentanyl, Duloxetine, Venlafaxine, and Methadone    -------------------------------------------------- RESULTS -------------------------------------------------  All laboratory and radiology results have been personally  reviewed by myself   LABS:  Labs Reviewed   CBC WITH AUTO DIFFERENTIAL - Abnormal       Result Value    WBC 15.1 (*)     nRBC 0.0      RBC 4.32 (*)     Hemoglobin 12.6 (*)     Hematocrit 38.6 (*)     MCV 89      MCH 29.2      MCHC 32.6      RDW 13.9      Platelets 321      Neutrophils % 78.0      Immature Granulocytes %, Automated 0.7      Lymphocytes % 11.4      Monocytes % 9.4      Eosinophils % 0.2      Basophils % 0.3      Neutrophils Absolute 11.80 (*)     Immature Granulocytes Absolute, Automated 0.11      Lymphocytes Absolute 1.73      Monocytes Absolute 1.43 (*)     Eosinophils Absolute 0.03      Basophils Absolute 0.04     COMPREHENSIVE METABOLIC PANEL - Abnormal    Glucose 217 (*)     Sodium 132 (*)     Potassium 3.9      Chloride 99      Bicarbonate 22      Anion Gap 15      Urea Nitrogen 15      Creatinine 1.07      eGFR 78      Calcium 8.9      Albumin 3.6      Alkaline Phosphatase 67      Total Protein 7.3      AST 16      Bilirubin, Total 1.0      ALT 11     SERIAL TROPONIN-INITIAL - Normal    Troponin I, High Sensitivity 5      Narrative:     Less than 99th percentile of normal range cutoff-  Female and children under 18 years old <14 ng/L; Male <21 ng/L: Negative  Repeat testing should be performed if clinically indicated.     Female and children under 18 years old 14-50 ng/L; Male 21-50 ng/L:  Consistent with possible cardiac damage and possible increased clinical   risk. Serial measurements may help to assess extent of myocardial damage.     >50 ng/L: Consistent with cardiac damage, increased clinical risk and  myocardial infarction. Serial measurements may help assess extent of   myocardial damage.      NOTE: Children less than 1 year old may have higher baseline troponin   levels and results should be interpreted in conjunction with the overall   clinical context.     NOTE: Troponin I testing is performed using a different   testing methodology at [facility] than at other   system  [facility]. Direct result comparisons should only   be made within the same method.   SERIAL TROPONIN, 1 HOUR - Normal    Troponin I, High Sensitivity 5      Narrative:     Less than 99th percentile of normal range cutoff-  Female and children under 18 years old <14 ng/L; Male <21 ng/L: Negative  Repeat testing should be performed if clinically indicated.     Female and children under 18 years old 14-50 ng/L; Male 21-50 ng/L:  Consistent with possible cardiac damage and possible increased clinical   risk. Serial measurements may help to assess extent of myocardial damage.     >50 ng/L: Consistent with cardiac damage, increased clinical risk and  myocardial infarction. Serial measurements may help assess extent of   myocardial damage.      NOTE: Children less than 1 year old may have higher baseline troponin   levels and results should be interpreted in conjunction with the overall   clinical context.     NOTE: Troponin I testing is performed using a different   testing methodology at [facility] than at other   [facility]. Direct result comparisons should only   be made within the same method.   B-TYPE NATRIURETIC PEPTIDE - Normal    BNP 57      Narrative:        <100 pg/mL - Heart failure unlikely  100-299 pg/mL - Intermediate probability of acute heart                  failure exacerbation. Correlate with clinical                  context and patient history.    >=300 pg/mL - Heart Failure likely. Correlate with clinical                  context and patient history.    BNP testing is performed using different testing methodology at [facility] than at other [facility]. Direct result comparisons should only be made within the same method.      SARS-COV-2 PCR - Normal    Coronavirus 2019, PCR Not Detected      Narrative:     This assay has received FDA Emergency Use Authorization (EUA) and is only authorized for the duration of time that circumstances exist to justify the  authorization of the emergency use of in vitro diagnostic tests for the detection of SARS-CoV-2 virus and/or diagnosis of COVID-19 infection under section 564(b)(1) of the Act, 21 U.S.C. 360bbb-3(b)(1). This assay is an in vitro diagnostic nucleic acid amplification test for the qualitative detection of SARS-CoV-2 from nasopharyngeal specimens and has been validated for use at [facility]. Negative results do not preclude COVID-19 infections and should not be used as the sole basis for diagnosis, treatment, or other management decisions.     RSV PCR - Normal    RSV PCR Not Detected      Narrative:     This assay is an FDA-cleared, in vitro diagnostic nucleic acid amplification test for the detection of RSV from nasopharyngeal specimens, and has been validated for use at [facility]. Negative results do not preclude RSV infections, and should not be used as the sole basis for diagnosis, treatment, or other management decisions. If Influenza A/B and RSV PCR results are negative, testing for Parainfluenza virus, Adenovirus and Metapneumovirus is routinely performed for pediatric oncology and intensive care in[place] [facility] and is available on other patients by placing an add-on request.       BLOOD CULTURE   BLOOD CULTURE   TROPONIN SERIES- (INITIAL, 1 HR)    Narrative:     The following orders were created for panel order Troponin Series, (0, 1 HR).  Procedure                               Abnormality         Status                     ---------                               -----------         ------                     Troponin I, High Sensiti...[[id number]]  Normal              Final result               Troponin, High Sensitivi...[[id number]]  Normal              Final result                 Please view results for these tests on the individual orders.         RADIOLOGY:  Interpreted by Radiologist.  CT angio chest for pulmonary embolism   Final Result   Right upper  "lobe consolidation favoring pneumonia. Recommend   follow-up with chest or radiograph in 3 months to ensure resolution.        No evidence of pulmonary embolism.        MACRO:   None        Signed by: Fredis Cisneros 7/8/2024 11:47 PM   Dictation workstation:   [id number]      XR chest 1 view   Final Result   1. New consolidation in the right upper lobe concerning for pneumonia.             Signed by: Jose Michel 7/8/2024 10:31 PM   Dictation workstation:   [id number]          Encounter Date: 05/28/24   ECG 12 lead   Result Value    Ventricular Rate 101    Atrial Rate 101    AK Interval 146    QRS Duration 88    QT Interval 336    QTC Calculation(Bazett) 435    P Axis 36    R Axis -26    T Axis 42    QRS Count 16    Q Onset 211    P Onset 138    P Offset 200    T Offset 379    QTC Fredericia 399    Narrative    Sinus tachycardia  Otherwise normal ECG  When compared with ECG of 20-DEC-2011 04:13,  QRS duration has decreased  See ED provider note for full interpretation and clinical correlation  Confirmed by Sandy Marquez ([id number]) on 5/28/2024 12:06:45 PM     ------------------------- NURSING NOTES AND VITALS REVIEWED ---------------------------   The nursing notes within the ED encounter and vital signs as below have been reviewed.   /71 (BP Location: Right arm, Patient Position: Sitting)   Pulse 79   Temp (!) 38.3 °C (100.9 °F) (Temporal)   Resp (!) 23   Ht 1.854 m (6' 1\")   Wt 132 kg (290 lb)   SpO2 96%   BMI 38.26 kg/m²   Oxygen Saturation Interpretation: Normal      ---------------------------------------------------PHYSICAL EXAM--------------------------------------  Physical Exam  Vitals and nursing note reviewed.   Constitutional:       General: He is not in acute distress.     Appearance: He is well-developed. He is ill-appearing. He is not toxic-appearing.      Interventions: He is not intubated.  HENT:      Head: Normocephalic and atraumatic.      Mouth/Throat:      Mouth: Mucous membranes are " moist.      Pharynx: Oropharynx is clear. No pharyngeal swelling or oropharyngeal exudate.   Eyes:      Extraocular Movements: Extraocular movements intact.      Conjunctiva/sclera: Conjunctivae normal.      Pupils: Pupils are equal, round, and reactive to light.   Neck:      Thyroid: No thyromegaly.      Vascular: No hepatojugular reflux or JVD.      Trachea: No tracheal deviation.   Cardiovascular:      Rate and Rhythm: Normal rate and regular rhythm.      Pulses: Normal pulses.      Heart sounds: Normal heart sounds. No murmur heard.  Pulmonary:      Effort: Pulmonary effort is normal. Tachypnea present. No accessory muscle usage or respiratory distress. He is not intubated.      Breath sounds: No stridor. Examination of the right-middle field reveals decreased breath sounds, wheezing and rhonchi. Examination of the right-lower field reveals decreased breath sounds, wheezing and rhonchi. Examination of the left-lower field reveals decreased breath sounds and rhonchi. Decreased breath sounds, wheezing and rhonchi present. No rales.   Chest:      Chest wall: No mass, deformity, tenderness, crepitus or edema. There is no dullness to percussion.   Abdominal:      Palpations: Abdomen is soft. There is no hepatomegaly or mass.      Tenderness: There is no abdominal tenderness. There is no guarding or rebound.   Musculoskeletal:         General: No swelling. Normal range of motion.      Cervical back: Normal range of motion and neck supple.      Right lower leg: No tenderness. No edema.      Left lower leg: No tenderness. No edema.   Lymphadenopathy:      Cervical: No cervical adenopathy.   Skin:     General: Skin is warm and dry.      Capillary Refill: Capillary refill takes less than 2 seconds.      Coloration: Skin is not cyanotic.      Nails: There is no clubbing.   Neurological:      Mental Status: He is alert and oriented to person, place, and time.   Psychiatric:         Mood and Affect: Mood normal.             Procedures  NONE  ------------------------------ ED COURSE/MEDICAL DECISION MAKING----------------------    Medical Decision Making:   Patient was seen examined by me  And IV was started appropriate labs obtained.  Chest x-ray shows right upper lobe infiltrate.  Blood cultures are obtained and patient started on IV Rocephin 2 g, and IV azithromycin 500 mg.  Patient has leukocytosis 15,000 consistent with pneumonia.  He has fever.  Patient was given a DuoNeb aerosol with some relief.  Patient does wear home oxygen 2 L at night.  He is requiring oxygen here.    I do feel the patient requires hospitalization for IV antibiotics and pulmonary toilet  We do not have any beds available here at [facility], I have contacted the  transfer center to see if we can get him transferred to [facility] as he would be agreeable to this.  There are no beds at [facility].  Will attempt to tranfer to [facility].  Patient is agreeable.      ED Course as of 07/09/24 0113   Mon Jul 08, 2024 2227 EKG at 2222 interpreted by me at 2225.  Normal sinus rhythm 79 bpm.  Left axis deviation.   ms QRS 88 ms  ms.  There is no acute ST-T change.  No STEMI. [EC]   2228 Previous EKG at 1924 interpreted by me.  Normal sinus rhythm 93 bpm.  Left axis deviation.   ms QRS 86 ms  ms there is no acute ST-T change.  No STEMI. [EC]   2228 CBC and Auto Differential(!)  White count is 15.1, hemoglobin 12.6, hematocrit 38.6, platelet count 321,000 [EC]   2228 Comprehensive metabolic panel(!)  Comprehensive metabolic panel is unremarkable. [EC]   2228 Troponin Series, (0, 1 HR)  Initial troponin is normal at 5 [EC]   Tue Jul 09, 2024   0018 Complaining of chronic back pain.  OARRS reviewed.  He is on Norco and gabapentin at home.  Single dose of gabapentin 600 mg ordered.  He has received Tylenol 975 mg.  He was ordered Oxy IR 5 mg p.o. for pain as well. [BT]   0047 Troponin Series, (0, 1 HR)  First troponin is 5,  second troponin is 5  BNP is normal at 57 [EC]   0048 Coronavirus is not detected  RSV is not detected [EC]   0049 Chest x-ray concerning for right upper lobe pneumonia. [EC]   0049 CT of the chest PE study  IMPRESSION:  Right upper lobe consolidation favoring pneumonia. Recommend  follow-up with chest or radiograph in 3 months to ensure resolution.      No evidence of pulmonary embolism.   [EC]      ED Course User Index  [BT] Drwe Reid DO  [EC] Geovanny No DO         Diagnoses as of 07/09/24 0113   Pneumonia of right upper lobe due to infectious organism   Shortness of breath   Hypoxemia      Counseling:   The emergency provider has spoken with the patient and discussed today’s results, in addition to providing specific details for the plan of care and counseling regarding the diagnosis and prognosis.  Questions are answered at this time and they are agreeable with the plan.      --------------------------------- IMPRESSION AND DISPOSITION ---------------------------------        IMPRESSION  1. Pneumonia of right upper lobe due to infectious organism    2. Shortness of breath    3. Hypoxemia        DISPOSITION  Disposition: Transfer to [facility] to telemetry,  Dr JATIN Guadalupe  Patient condition is stable      Billing Provider Critical Care Time: 0 minutes     Geovanny No DO  07/09/24 0123

## 2024-07-10 VITALS
WEIGHT: 279.32 LBS | RESPIRATION RATE: 18 BRPM | DIASTOLIC BLOOD PRESSURE: 60 MMHG | HEIGHT: 73 IN | BODY MASS INDEX: 37.02 KG/M2 | OXYGEN SATURATION: 90 % | TEMPERATURE: 98 F | HEART RATE: 65 BPM | SYSTOLIC BLOOD PRESSURE: 113 MMHG

## 2024-07-10 PROBLEM — J18.8 OTHER PNEUMONIA, UNSPECIFIED ORGANISM: Status: RESOLVED | Noted: 2024-07-09 | Resolved: 2024-07-10

## 2024-07-10 PROBLEM — J18.9 CAP (COMMUNITY ACQUIRED PNEUMONIA): Status: RESOLVED | Noted: 2024-07-09 | Resolved: 2024-07-10

## 2024-07-10 LAB
ALBUMIN SERPL BCP-MCNC: 3.4 G/DL (ref 3.4–5)
ANION GAP SERPL CALC-SCNC: 8 MMOL/L (ref 10–20)
BUN SERPL-MCNC: 13 MG/DL (ref 6–23)
CALCIUM SERPL-MCNC: 8.5 MG/DL (ref 8.6–10.3)
CHLORIDE SERPL-SCNC: 100 MMOL/L (ref 98–107)
CO2 SERPL-SCNC: 25 MMOL/L (ref 21–32)
CREAT SERPL-MCNC: 0.84 MG/DL (ref 0.5–1.3)
EGFRCR SERPLBLD CKD-EPI 2021: >90 ML/MIN/1.73M*2
ERYTHROCYTE [DISTWIDTH] IN BLOOD BY AUTOMATED COUNT: 13.8 % (ref 11.5–14.5)
EST. AVERAGE GLUCOSE BLD GHB EST-MCNC: 148 MG/DL
GLUCOSE BLD MANUAL STRIP-MCNC: 115 MG/DL (ref 74–99)
GLUCOSE BLD MANUAL STRIP-MCNC: 129 MG/DL (ref 74–99)
GLUCOSE BLD MANUAL STRIP-MCNC: 157 MG/DL (ref 74–99)
GLUCOSE BLD MANUAL STRIP-MCNC: 165 MG/DL (ref 74–99)
GLUCOSE SERPL-MCNC: 124 MG/DL (ref 74–99)
HBA1C MFR BLD: 6.8 %
HCT VFR BLD AUTO: 38.9 % (ref 41–52)
HGB BLD-MCNC: 12.3 G/DL (ref 13.5–17.5)
MAGNESIUM SERPL-MCNC: 2.14 MG/DL (ref 1.6–2.4)
MCH RBC QN AUTO: 28.3 PG (ref 26–34)
MCHC RBC AUTO-ENTMCNC: 31.6 G/DL (ref 32–36)
MCV RBC AUTO: 90 FL (ref 80–100)
NRBC BLD-RTO: 0 /100 WBCS (ref 0–0)
PHOSPHATE SERPL-MCNC: 3 MG/DL (ref 2.5–4.9)
PLATELET # BLD AUTO: 304 X10*3/UL (ref 150–450)
POTASSIUM SERPL-SCNC: 3.8 MMOL/L (ref 3.5–5.3)
RBC # BLD AUTO: 4.34 X10*6/UL (ref 4.5–5.9)
SODIUM SERPL-SCNC: 129 MMOL/L (ref 136–145)
WBC # BLD AUTO: 11 X10*3/UL (ref 4.4–11.3)

## 2024-07-10 PROCEDURE — 2500000001 HC RX 250 WO HCPCS SELF ADMINISTERED DRUGS (ALT 637 FOR MEDICARE OP): Performed by: NURSE PRACTITIONER

## 2024-07-10 PROCEDURE — 2500000004 HC RX 250 GENERAL PHARMACY W/ HCPCS (ALT 636 FOR OP/ED): Performed by: STUDENT IN AN ORGANIZED HEALTH CARE EDUCATION/TRAINING PROGRAM

## 2024-07-10 PROCEDURE — 36415 COLL VENOUS BLD VENIPUNCTURE: CPT | Performed by: NURSE PRACTITIONER

## 2024-07-10 PROCEDURE — 2500000004 HC RX 250 GENERAL PHARMACY W/ HCPCS (ALT 636 FOR OP/ED): Performed by: NURSE PRACTITIONER

## 2024-07-10 PROCEDURE — 9420000001 HC RT PATIENT EDUCATION 5 MIN

## 2024-07-10 PROCEDURE — 82947 ASSAY GLUCOSE BLOOD QUANT: CPT

## 2024-07-10 PROCEDURE — 99239 HOSP IP/OBS DSCHRG MGMT >30: CPT | Performed by: STUDENT IN AN ORGANIZED HEALTH CARE EDUCATION/TRAINING PROGRAM

## 2024-07-10 PROCEDURE — G0378 HOSPITAL OBSERVATION PER HR: HCPCS

## 2024-07-10 PROCEDURE — 2500000005 HC RX 250 GENERAL PHARMACY W/O HCPCS: Performed by: STUDENT IN AN ORGANIZED HEALTH CARE EDUCATION/TRAINING PROGRAM

## 2024-07-10 PROCEDURE — 83735 ASSAY OF MAGNESIUM: CPT | Performed by: NURSE PRACTITIONER

## 2024-07-10 PROCEDURE — 94660 CPAP INITIATION&MGMT: CPT

## 2024-07-10 PROCEDURE — 94640 AIRWAY INHALATION TREATMENT: CPT

## 2024-07-10 PROCEDURE — 84100 ASSAY OF PHOSPHORUS: CPT | Performed by: NURSE PRACTITIONER

## 2024-07-10 PROCEDURE — 85027 COMPLETE CBC AUTOMATED: CPT | Performed by: NURSE PRACTITIONER

## 2024-07-10 PROCEDURE — 94760 N-INVAS EAR/PLS OXIMETRY 1: CPT

## 2024-07-10 PROCEDURE — 2500000002 HC RX 250 W HCPCS SELF ADMINISTERED DRUGS (ALT 637 FOR MEDICARE OP, ALT 636 FOR OP/ED): Performed by: NURSE PRACTITIONER

## 2024-07-10 RX ORDER — AMOXICILLIN AND CLAVULANATE POTASSIUM 875; 125 MG/1; MG/1
1 TABLET, FILM COATED ORAL 2 TIMES DAILY
Qty: 14 TABLET | Refills: 0 | Status: SHIPPED | OUTPATIENT
Start: 2024-07-10 | End: 2024-07-12 | Stop reason: WASHOUT

## 2024-07-10 ASSESSMENT — PAIN SCALES - GENERAL
PAINLEVEL_OUTOF10: 6
PAINLEVEL_OUTOF10: 7
PAINLEVEL_OUTOF10: 5 - MODERATE PAIN

## 2024-07-10 ASSESSMENT — PAIN DESCRIPTION - LOCATION: LOCATION: HEAD

## 2024-07-10 ASSESSMENT — PAIN - FUNCTIONAL ASSESSMENT: PAIN_FUNCTIONAL_ASSESSMENT: 0-10

## 2024-07-10 ASSESSMENT — PAIN DESCRIPTION - DESCRIPTORS: DESCRIPTORS: ACHING

## 2024-07-10 NOTE — DISCHARGE SUMMARY
Discharge Diagnosis  Other pneumonia, unspecified organism    Issues Requiring Follow-Up  None    Test Results Pending At Discharge  Pending Labs       No current pending labs.            Hospital Course  Rome Mullins is a 64 y.o. male  with PMHx significant for long-haul covid, chronic respiratory failure with hypoxia and requires CPAP with oxygen bleed of 2 liters at night,  atrial fibrillation with naomy filter, PE, DVT, PVD, long-term anticoagulation with eliquis, DM II with hyperglycemia, chronic pain and has had multiple back surgeries and shoulder surgeries who [facility] with [facility] due to sob cough, headache, beginning last night.      Patient was treated for Cap and acute otitis media. He responded well to IV Unasyn and was transitioned to PO Augmentin. Discharged home 7/10/2024.    More than 35 minutes were spent in coordinating patient discharge.      Pertinent Physical Exam At Time of Discharge  Physical Exam  Vitals and nursing note reviewed.   Constitutional:       General: He is not in acute distress.     Appearance: Normal appearance. He is obese.   HENT:      Head: Atraumatic.      Mouth/Throat:      Mouth: Mucous membranes are moist.   Eyes:      Extraocular Movements: Extraocular movements intact.      Conjunctiva/sclera: Conjunctivae normal.   Cardiovascular:      Rate and Rhythm: Normal rate and regular rhythm.      Pulses: Normal pulses.      Heart sounds: Normal heart sounds.   Pulmonary:      Effort: Pulmonary effort is normal.      Breath sounds: Normal breath sounds.   Abdominal:      General: Abdomen is flat. Bowel sounds are normal.      Palpations: Abdomen is soft.   Musculoskeletal:         General: Normal range of motion.   Skin:     General: Skin is warm.   Neurological:      General: No focal deficit present.      Mental Status: He is alert and oriented to person, place, and time.   Psychiatric:         Mood and Affect: Mood normal.          Behavior: Behavior normal.         Thought Content: Thought content normal.         Judgment: Judgment normal.         Home Medications     Medication List      START taking these medications     amoxicillin-pot clavulanate 875-125 mg tablet; Commonly known as:   Augmentin; Take 1 tablet by mouth 2 times a day for 7 days.     CONTINUE taking these medications     acetaminophen 500 mg tablet; Commonly known as: Tylenol   albuterol 90 mcg/actuation inhaler; Inhale 1-2 puffs every 6 hours if   needed for wheezing or shortness of breath.   atorvastatin 10 mg tablet; Commonly known as: Lipitor; Take 1 tablet (10   mg) by mouth once daily.   Eliquis 5 mg tablet; Generic drug: apixaban; Take 1 tablet by mouth   twice daily with or without food.   gabapentin 600 mg tablet; Commonly known as: Neurontin; TAKE ONE TABLET   BY MOUTH THREE TIMES A DAY   lisinopril 10 mg tablet   metFORMIN 500 mg tablet; Commonly known as: Glucophage; Take 1 tablet   (500 mg) by mouth once daily with breakfast.   metoprolol tartrate 25 mg tablet; Commonly known as: Lopressor   omeprazole 40 mg DR capsule; Commonly known as: PriLOSEC   sertraline 100 mg tablet; Commonly known as: Zoloft; TAKE ONE TABLET BY   MOUTH ONCE EVERY DAY   sildenafil 100 mg tablet; Commonly known as: Viagra       Outpatient Follow-Up  Future Appointments   Date Time Provider Department Center   7/15/2024 12:30 PM GEN CT 1 GENCT [facility]   7/23/2024  1:00 PM Chelle Melendez DPM [id number] [facility]   8/20/2024  3:00 PM Damon Logan MD [id number] [facility]   9/30/2024  4:00 PM Eda Mcgowan APRN-CNP [id number] [facility]   10/14/2024  3:00 PM Victoria Aguirre APRN-CNP [id number] [facility]   11/21/2024  3:00 PM Edward Sims MD [id number] [facility]       Mike Guadalupe DO

## 2024-07-10 NOTE — CARE PLAN
The patient's goals for the shift include      The clinical goals for the shift include Patient will have no SOB through 1900    Patient with uneventful shift. Tolerated IV ATB, no s/s of adverse reactions noted thus far. Patient ambulates independently. Continent of B&B. Medicated for pain per MAR. RA during the day and CPAP HS. Patient is discharging home with oral ATB. Patient with no questions about discharge. Patient resting in chair, call button in reach waiting for son to .

## 2024-07-10 NOTE — CARE PLAN
The patient's goals for the shift include      The clinical goals for the shift include Patient SPO2 will remain > 92% throughout shift    Over the shift, the patient had an uneventful shift  Wore Cpap per RT, slept well in chair  Tolerated IV antibiotics as ordered  Medicated x 1 for c/o pain with good effect  VSS  Monitor displays NSR  Call light in reach

## 2024-07-10 NOTE — PROGRESS NOTES
07/10/24 1332   Discharge Planning   Living Arrangements Spouse/significant other   Support Systems Spouse/significant other   Assistance Needed has Cpap at home, sleeps in recliner, no supplemental oxygen needs   Does the patient need discharge transport arranged? No   Patient expects to be discharged to: home     Pt is independent at home; uses Cpap and sleeps in his recliner. [facility] 24. Pt feels he has everything to manage his health at home and plans discharge today. He has a ride home. RUBINA Romero

## 2024-07-11 NOTE — SIGNIFICANT EVENT
PNEUMONIA Follow up Call    The Patient discharged With the following Diagnosis: Pneumonia:        How is your breathing? unchanged/at baseline   How is your activity? same/at baseline   How is your cough?  usual amount   Mucus: mucus: usual amount and color   How often are you using a Quick Relief/ Rescue Inhaler / Nebulizer:   usual amount

## 2024-07-12 ENCOUNTER — TELEPHONE (OUTPATIENT)
Dept: PRIMARY CARE | Facility: CLINIC | Age: 64
End: 2024-07-12
Payer: COMMERCIAL

## 2024-07-12 DIAGNOSIS — J18.9 PNEUMONIA OF RIGHT UPPER LOBE DUE TO INFECTIOUS ORGANISM: Primary | ICD-10-CM

## 2024-07-12 RX ORDER — DOXYCYCLINE 100 MG/1
100 CAPSULE ORAL 2 TIMES DAILY
Qty: 14 CAPSULE | Refills: 0 | Status: SHIPPED | OUTPATIENT
Start: 2024-07-12 | End: 2024-07-19

## 2024-07-12 NOTE — TELEPHONE ENCOUNTER
Called Pt who reports experiencing diarrhea and GI upset from the Augmentin. Will prescribe doxycyline 100mg BID instead. Side effects discussed. Informed Pt he will need to be seen for hospital follow up within 1 week.   Pt verbalized understanding

## 2024-07-12 NOTE — TELEPHONE ENCOUNTER
Patient states that he was released from the hospital wed and he was discharged  with a antibiotic and it gives him diarrhea and he wants to know if you would be willing to ngive him a different antibiotic please advise

## 2024-07-13 LAB
BACTERIA BLD CULT: NORMAL
BACTERIA BLD CULT: NORMAL

## 2024-07-14 LAB
ATRIAL RATE: 79 BPM
ATRIAL RATE: 93 BPM
P AXIS: 23 DEGREES
P AXIS: 38 DEGREES
P OFFSET: 195 MS
P OFFSET: 202 MS
P ONSET: 139 MS
P ONSET: 140 MS
PR INTERVAL: 140 MS
PR INTERVAL: 142 MS
Q ONSET: 210 MS
Q ONSET: 210 MS
QRS COUNT: 13 BEATS
QRS COUNT: 15 BEATS
QRS DURATION: 86 MS
QRS DURATION: 88 MS
QT INTERVAL: 336 MS
QT INTERVAL: 368 MS
QTC CALCULATION(BAZETT): 417 MS
QTC CALCULATION(BAZETT): 421 MS
QTC FREDERICIA: 389 MS
QTC FREDERICIA: 403 MS
R AXIS: -27 DEGREES
R AXIS: -32 DEGREES
T AXIS: 37 DEGREES
T AXIS: 40 DEGREES
T OFFSET: 378 MS
T OFFSET: 394 MS
VENTRICULAR RATE: 79 BPM
VENTRICULAR RATE: 93 BPM

## 2024-07-15 ENCOUNTER — HOSPITAL ENCOUNTER (OUTPATIENT)
Dept: RADIOLOGY | Facility: HOSPITAL | Age: 64
Discharge: HOME | End: 2024-07-15
Payer: COMMERCIAL

## 2024-07-15 PROCEDURE — 74177 CT ABD & PELVIS W/CONTRAST: CPT

## 2024-07-15 PROCEDURE — 2550000001 HC RX 255 CONTRASTS: Performed by: SURGERY

## 2024-07-15 PROCEDURE — 74177 CT ABD & PELVIS W/CONTRAST: CPT | Performed by: RADIOLOGY

## 2024-07-23 ENCOUNTER — APPOINTMENT (OUTPATIENT)
Dept: PODIATRY | Facility: CLINIC | Age: 64
End: 2024-07-23
Payer: COMMERCIAL

## 2024-07-23 DIAGNOSIS — M79.672 PAIN IN BOTH FEET: Primary | ICD-10-CM

## 2024-07-23 DIAGNOSIS — M79.671 PAIN IN BOTH FEET: Primary | ICD-10-CM

## 2024-07-23 DIAGNOSIS — I73.9 PVD (PERIPHERAL VASCULAR DISEASE) (CMS-HCC): ICD-10-CM

## 2024-07-23 DIAGNOSIS — E11.9 ENCOUNTER FOR DIABETIC FOOT EXAM (MULTI): ICD-10-CM

## 2024-07-23 PROCEDURE — 99213 OFFICE O/P EST LOW 20 MIN: CPT | Performed by: PODIATRIST

## 2024-07-23 PROCEDURE — 1036F TOBACCO NON-USER: CPT | Performed by: PODIATRIST

## 2024-07-23 NOTE — PROGRESS NOTES
History of Present Illness:   Patient states they are here for Dm exam  Denies NTB to feet  Is on blood thinner  History of blood clot in legs and PE  Has pain to right 2nd toe  Most recent A1C is 6.8    Past Medical History  Past Medical History:   Diagnosis Date    Allergic     Anxiety     Arthritis 09/01/2023    Clotting disorder (CMS/[facility])     Depression     Difficulty walking 2003    HL (hearing loss) 05/01/2023    Hypertension 2000?    Urinary tract infection 01/01/2015       Medications and Allergies have been reviewed.    Review Of Systems:  GENERAL: No weight loss, malaise or fevers.  HEENT: Negative for frequent or significant headaches,   RESPIRATORY: Negative for cough, wheezing or shortness of breath.  CARDIOVASCULAR: Negative for chest pain, leg swelling or palpitations.      Examination of Both Lower Extremities:   Objective:   Vasc: DP and PT pulses are palpable bilateral.  CFT is less than 3 seconds bilateral.  Skin temperature is warm to cool proximal to distal bilateral.  No hair growth noted    Neuro: Vibratory, light touch and proprioception are intact bilateral.      Derm: Nails 1-5 bilateral are intact , 2nd and 3rd nail thick.  Skin is supple with normal texture and turgor noted.  Webspaces are clean, dry and intact bilateral.  There are no hyperkeratoses, ulcerations, verruca or other lesions noted.      Ortho: Muscle strength is 5/5 for all pedal groups tested.   1. Pain in both feet        2. PVD (peripheral vascular disease) (CMS/[facility])        3. Onychomycosis          1. Pain in both feet  Diabetic Shoe For Density      2. PVD (peripheral vascular disease) (CMS-[facility])  Diabetic Shoe For Density      3. Encounter for diabetic foot exam (Multi)  Diabetic Shoe For Density          Patient educated on proper diabetic foot care.  Nails 1-5 b/l were debrided in thickness and length with nail cutting forceps.  Discussed r 2nd toe pain, discussed nail being filed down  Discussed leg compression    A1C revd. 6.8  Gave rx for custom inserts. List of vendors  Patient to follow up in 6 mos or sooner if any problems arise.   Patient was in agreement to this plan. All questions answered.      Chelle Melendez DPM  [phone]  Option 2  Fax: [phone]

## 2024-07-26 DIAGNOSIS — I10 PRIMARY HYPERTENSION: Primary | ICD-10-CM

## 2024-08-06 RX ORDER — LISINOPRIL 10 MG/1
TABLET ORAL
Qty: 90 TABLET | Refills: 0 | Status: SHIPPED | OUTPATIENT
Start: 2024-08-06

## 2024-08-06 RX ORDER — METOPROLOL TARTRATE 25 MG/1
25 TABLET, FILM COATED ORAL
Qty: 180 TABLET | Refills: 0 | Status: SHIPPED | OUTPATIENT
Start: 2024-08-06

## 2024-08-13 ENCOUNTER — HOSPITAL ENCOUNTER (OUTPATIENT)
Dept: RADIOLOGY | Facility: EXTERNAL LOCATION | Age: 64
Discharge: HOME | End: 2024-08-13

## 2024-08-13 DIAGNOSIS — R52 PAIN: ICD-10-CM

## 2024-08-20 ENCOUNTER — OFFICE VISIT (OUTPATIENT)
Dept: ORTHOPEDIC SURGERY | Facility: CLINIC | Age: 64
End: 2024-08-20
Payer: COMMERCIAL

## 2024-08-20 DIAGNOSIS — Z96.641 HISTORY OF TOTAL RIGHT HIP REPLACEMENT: ICD-10-CM

## 2024-08-20 DIAGNOSIS — Z96.649 HIP PAIN ASSOCIATED WITH RECALLED TOTAL HIP ARTHROPLASTY HARDWARE, INITIAL ENCOUNTER (CMS-HCC): Primary | ICD-10-CM

## 2024-08-20 DIAGNOSIS — T84.84XA HIP PAIN ASSOCIATED WITH RECALLED TOTAL HIP ARTHROPLASTY HARDWARE, INITIAL ENCOUNTER (CMS-HCC): Primary | ICD-10-CM

## 2024-08-20 PROCEDURE — 1036F TOBACCO NON-USER: CPT | Performed by: STUDENT IN AN ORGANIZED HEALTH CARE EDUCATION/TRAINING PROGRAM

## 2024-08-20 PROCEDURE — 99214 OFFICE O/P EST MOD 30 MIN: CPT | Performed by: STUDENT IN AN ORGANIZED HEALTH CARE EDUCATION/TRAINING PROGRAM

## 2024-08-20 PROCEDURE — 99204 OFFICE O/P NEW MOD 45 MIN: CPT | Performed by: STUDENT IN AN ORGANIZED HEALTH CARE EDUCATION/TRAINING PROGRAM

## 2024-08-20 NOTE — PROGRESS NOTES
PRIMARY CARE PHYSICIAN: KURT Henderson-CNP  REFERRING PROVIDER: No referring provider defined for this encounter.       SUBJECTIVE  CHIEF COMPLAINT:   Chief Complaint   Patient presents with    Right Hip - New Patient Visit        HPI: Rome Mullins is a pleasant 64 y.o. year-old male who is seen today for evaluation of right hip pain.  Patient had his right hip replaced in 2017 at the [facility].  He had his left hip replaced in 2010 at the [facility] as well.  His right hip has been symptomatic since about 1 year after the replacement.  His symptoms continue to be in the lateral aspect of the hip.  It has been worsening over the past 1.5 years.  So much so that this summer, he was very limited in his activities.  He was unable to perform many recreational activities due to severe pain.  He is also having some instability.  He has not fallen due to the hip but has had several instances where there has been near falls.    He denies any history of dislocation.  Denies any history of infection or delayed wound healing.    He has a complex medical history.  He is seeing the vascular surgeons for venous insufficiency.  He does have lower extremity discoloration but no marisol ulcers.    REVIEW OF SYSTEMS  The patient denies any fever, chills, chest pain, shortness of breath or difficulty breathing.  Patient denies any numbness, tingling, or radicular symptoms.  Adult patient history sheet was filled out by the patient today in clinic and will be scanned into the EMR.  I personally reviewed this form which will be scanned into the EMR.  This includes Past Medical History, Past Surgical History, Medications, Allergies, Social History, Family History and 12 point review of systems.    Past Medical History:   Diagnosis Date    Allergic     Anxiety     Arthritis 09/01/2023    Clotting disorder (Multi)     Depression     Difficulty walking 2003    HL (hearing loss) 05/01/2023    Hypertension 2000?     Urinary tract infection 2015        Allergies   Allergen Reactions    Fentanyl Palpitations and Unknown     rapid heart rate    Duloxetine GI Upset    Venlafaxine GI Upset    Methadone Palpitations        Past Surgical History:   Procedure Laterality Date    BACK SURGERY  2002    CHOLECYSTECTOMY  2008    HERNIA REPAIR  2011    JOINT REPLACEMENT  2010    SPINE SURGERY  2002    TOENAIL EXCISION  2019    TONSILLECTOMY  1971    WISDOM TOOTH EXTRACTION  1998        Family History   Problem Relation Name Age of Onset    Mental illness Mother Chey     Hypertension Mother Chey     Diabetes Mother Chey     Peripheral vascular disease Father Sd     Diabetes Father Sd     Hypertension Father Sd     Cancer Father Sd     No Known Problems Sister      No Known Problems Daughter      No Known Problems Son      Diabetes Other fam hx     Heart disease Other fam hx     Stroke Other fam hx     Hypertension Other fam hx     Cancer Other fam hx         Social History     Socioeconomic History    Marital status:      Spouse name: Not on file    Number of children: Not on file    Years of education: Not on file    Highest education level: Not on file   Occupational History    Not on file   Tobacco Use    Smoking status: Former     Current packs/day: 0.00     Average packs/day: 1 pack/day for 14.7 years (14.7 ttl pk-yrs)     Types: Cigarettes, Cigars     Start date: 1974     Quit date: 1989     Years since quittin.5     Passive exposure: Never    Smokeless tobacco: Former   Substance and Sexual Activity    Alcohol use: Yes     Alcohol/week: 1.0 standard drink of alcohol     Types: 1 Cans of beer per week     Comment: Sometimes 3    Drug use: Never    Sexual activity: Not Currently     Partners: Female   Other Topics Concern    Not on file   Social History Narrative    Not on file     Social Determinants of Health     Financial Resource Strain: Low Risk  (2024)     Overall Financial Resource Strain (CARDIA)     Difficulty of Paying Living Expenses: Not hard at all   Food Insecurity: Not on file   Transportation Needs: No Transportation Needs (7/9/2024)    PRAPARE - Transportation     Lack of Transportation (Medical): No     Lack of Transportation (Non-Medical): No   Physical Activity: Not on file   Stress: Not on file   Social Connections: Unknown (1/5/2024)    Social Connection and Isolation Panel [NHANES]     Frequency of Communication with Friends and Family: Not on file     Frequency of Social Gatherings with Friends and Family: Not on file     Attends Mandaeism Services: Not on file     Active Member of Clubs or Organizations: Not on file     Attends Club or Organization Meetings: Not on file     Marital Status:    Intimate Partner Violence: Not At Risk (1/5/2024)    Humiliation, Afraid, Rape, and Kick questionnaire     Fear of Current or Ex-Partner: No     Emotionally Abused: No     Physically Abused: No     Sexually Abused: No   Housing Stability: Low Risk  (7/9/2024)    Housing Stability Vital Sign     Unable to Pay for Housing in the Last Year: No     Number of Places Lived in the Last Year: 1     Unstable Housing in the Last Year: No        CURRENT MEDICATIONS:   Current Outpatient Medications   Medication Sig Dispense Refill    acetaminophen (Tylenol) 500 mg tablet Take 2 tablets (1,000 mg) by mouth every 8 hours if needed.      albuterol 90 mcg/actuation inhaler Inhale 1-2 puffs every 6 hours if needed for wheezing or shortness of breath. 18 g 1    atorvastatin (Lipitor) 10 mg tablet Take 1 tablet (10 mg) by mouth once daily. 90 tablet 3    Eliquis 5 mg tablet Take 1 tablet by mouth twice daily with or without food. 60 tablet 3    gabapentin (Neurontin) 600 mg tablet TAKE ONE TABLET BY MOUTH THREE TIMES A  tablet 0    lisinopril 10 mg tablet TAKE ONE TABLET BY MOUTH EVERY DAY FOR 30 DAYS 90 tablet 0    metFORMIN (Glucophage) 500 mg tablet Take 1  tablet (500 mg) by mouth once daily with breakfast. 90 tablet 3    metoprolol tartrate (Lopressor) 25 mg tablet TAKE ONE TABLET BY MOUTH TWICE A DAY WITH FOOD 180 tablet 0    omeprazole (PriLOSEC) 40 mg DR capsule Take 1 capsule (40 mg) by mouth once daily.      sertraline (Zoloft) 100 mg tablet TAKE ONE TABLET BY MOUTH ONCE EVERY DAY 30 tablet 3    sildenafil (Viagra) 100 mg tablet Take 1 tablet (100 mg) by mouth once daily as needed.       No current facility-administered medications for this visit.        OBJECTIVE    PHYSICAL EXAM  There is no height or weight on file to calculate BMI.    General: Well-appearing male in no acute distress.  Awake, alert and oriented.  Pleasant and cooperative.  Respiratory: Non-labored breathing  Mood: Euthymic   Gait: Antalgic  Assistive Device: None     Limb Length Discrepancy: His right leg is about 5 mm shorter than his left    Affected Right Hip  Range of motion:   Patient has full range of motion of the hip.  However, he does have pain with greater than 10 degrees of internal rotation.  Hip Flexor Strength: 5/5  Abductor Strength: 5/5  Adductor Strength: 5/5  Tenderness: Focal tenderness palpation along the greater trochanter.  This does extend somewhat distally to about the level of the mid thigh  Sensation: Intact to light touch distally  Motor function: Able to fire TA, EHL, G/S  Pulses: Palpable DP pulse    IMAGING:  Previous radiographs were reviewed.  They were compared with radiographs from 2023.  Results discussed with the patient.  He does have some lateral cortical thickening of the proximal femur.  It is unclear if this is a new finding as compared to his initial x-rays following the hip surgery.  However, there is no evidence of overt failure of the components.  No signs of failure or loosening.      ASSESSMENT & PLAN    IMPRESSION:  Rome Mullins is a 64 y.o. male with longstanding right hip pain in the setting of a previous total hip  arthroplasty.    PLAN:  My differential diagnosis for his pain includes greater trochanteric pain syndrome, infection or mechanical complications with aspect of the hip replacement.    I would like to first rule out infection with inflammatory markers and a CBC.  He will go to the lab today for those.  If they are elevated, we will send him for an aspiration.    With respect to his greater trochanteric pain syndrome, we will evaluate this with an MRI with metal suppression.  Patient does have a filter.  He will discuss with radiology if he is appropriate for an MRI.  He has had an MRI since the filter was implanted.  Concurrently he will perform home physical therapy exercises.  A packet of exercises was provided to the patient to address his bursitis.    Lastly, we will see the patient back after the MRI is completed to discuss results.  At that visit, would like to obtain repeat radiographs of the right hip.  3 views postoperative protocol.    The patient was in agreement the plan.  All questions were answered.    *This note was created using voice recognition software and was not corrected for typographical or grammatical errors.*

## 2024-08-22 ENCOUNTER — HOSPITAL ENCOUNTER (OUTPATIENT)
Dept: RADIOLOGY | Facility: CLINIC | Age: 64
Discharge: HOME | End: 2024-08-22
Payer: COMMERCIAL

## 2024-08-22 ENCOUNTER — OFFICE VISIT (OUTPATIENT)
Dept: ORTHOPEDIC SURGERY | Facility: CLINIC | Age: 64
End: 2024-08-22
Payer: COMMERCIAL

## 2024-08-22 ENCOUNTER — TELEMEDICINE (OUTPATIENT)
Dept: VASCULAR SURGERY | Facility: CLINIC | Age: 64
End: 2024-08-22
Payer: COMMERCIAL

## 2024-08-22 DIAGNOSIS — I83.813 PAIN DUE TO VARICOSE VEINS OF BOTH LOWER EXTREMITIES: ICD-10-CM

## 2024-08-22 DIAGNOSIS — R09.89 CORONA PHLEBECTATICA PARAPLANTARIS: ICD-10-CM

## 2024-08-22 DIAGNOSIS — M19.049 CMC ARTHRITIS: Primary | ICD-10-CM

## 2024-08-22 DIAGNOSIS — M79.642 LEFT HAND PAIN: ICD-10-CM

## 2024-08-22 DIAGNOSIS — I87.2 VENOUS INSUFFICIENCY OF BOTH LOWER EXTREMITIES: Primary | ICD-10-CM

## 2024-08-22 DIAGNOSIS — M79.3 LIPODERMATOSCLEROSIS OF BOTH LOWER EXTREMITIES: ICD-10-CM

## 2024-08-22 PROCEDURE — 99214 OFFICE O/P EST MOD 30 MIN: CPT | Performed by: ORTHOPAEDIC SURGERY

## 2024-08-22 PROCEDURE — 73130 X-RAY EXAM OF HAND: CPT | Mod: LT

## 2024-08-22 PROCEDURE — 1036F TOBACCO NON-USER: CPT | Performed by: NURSE PRACTITIONER

## 2024-08-22 PROCEDURE — 99443 PR PHYS/QHP TELEPHONE EVALUATION 21-30 MIN: CPT | Performed by: NURSE PRACTITIONER

## 2024-08-22 RX ORDER — DICLOFENAC SODIUM 30 MG/G
GEL TOPICAL 2 TIMES DAILY
Qty: 100 G | Refills: 1 | Status: SHIPPED | OUTPATIENT
Start: 2024-08-22

## 2024-08-22 ASSESSMENT — ENCOUNTER SYMPTOMS
OCCASIONAL FEELINGS OF UNSTEADINESS: 0
APPETITE CHANGE: 0
NEUROLOGICAL NEGATIVE: 1
PSYCHIATRIC NEGATIVE: 1
LOSS OF SENSATION IN FEET: 0
HEMATOLOGIC/LYMPHATIC NEGATIVE: 1
GASTROINTESTINAL NEGATIVE: 1
CHEST TIGHTNESS: 0
EYES NEGATIVE: 1
UNEXPECTED WEIGHT CHANGE: 0
SHORTNESS OF BREATH: 0
COLOR CHANGE: 1
ENDOCRINE NEGATIVE: 1
DEPRESSION: 0
PALPITATIONS: 0
MUSCULOSKELETAL NEGATIVE: 1
ALLERGIC/IMMUNOLOGIC NEGATIVE: 1
ACTIVITY CHANGE: 0

## 2024-08-22 ASSESSMENT — PATIENT HEALTH QUESTIONNAIRE - PHQ9
1. LITTLE INTEREST OR PLEASURE IN DOING THINGS: NOT AT ALL
SUM OF ALL RESPONSES TO PHQ9 QUESTIONS 1 AND 2: 0
2. FEELING DOWN, DEPRESSED OR HOPELESS: NOT AT ALL

## 2024-08-22 ASSESSMENT — PAIN SCALES - GENERAL: PAINLEVEL_OUTOF10: 8

## 2024-08-22 ASSESSMENT — PAIN - FUNCTIONAL ASSESSMENT: PAIN_FUNCTIONAL_ASSESSMENT: 0-10

## 2024-08-22 NOTE — PROGRESS NOTES
F/U REASON: painful varicose veins with skin discoloration     CURRENT ENCOUNTER:  Rome Mullins is 64 y.o. male here for follow up of  varicose veins with associated skin discoloration.     No new changes from when we saw him last.     Meds:     Current Outpatient Medications:     acetaminophen (Tylenol) 500 mg tablet, Take 2 tablets (1,000 mg) by mouth every 8 hours if needed., Disp: , Rfl:     albuterol 90 mcg/actuation inhaler, Inhale 1-2 puffs every 6 hours if needed for wheezing or shortness of breath., Disp: 18 g, Rfl: 1    atorvastatin (Lipitor) 10 mg tablet, Take 1 tablet (10 mg) by mouth once daily., Disp: 90 tablet, Rfl: 3    diclofenac sodium 3 % gel, Apply topically 2 times a day. As needed, Disp: 100 g, Rfl: 1    Eliquis 5 mg tablet, Take 1 tablet by mouth twice daily with or without food., Disp: 60 tablet, Rfl: 3    gabapentin (Neurontin) 600 mg tablet, TAKE ONE TABLET BY MOUTH THREE TIMES A DAY, Disp: 270 tablet, Rfl: 0    lisinopril 10 mg tablet, TAKE ONE TABLET BY MOUTH EVERY DAY FOR 30 DAYS, Disp: 90 tablet, Rfl: 0    metFORMIN (Glucophage) 500 mg tablet, Take 1 tablet (500 mg) by mouth once daily with breakfast., Disp: 90 tablet, Rfl: 3    metoprolol tartrate (Lopressor) 25 mg tablet, TAKE ONE TABLET BY MOUTH TWICE A DAY WITH FOOD, Disp: 180 tablet, Rfl: 0    omeprazole (PriLOSEC) 40 mg DR capsule, Take 1 capsule (40 mg) by mouth once daily., Disp: , Rfl:     sertraline (Zoloft) 100 mg tablet, TAKE ONE TABLET BY MOUTH ONCE EVERY DAY, Disp: 30 tablet, Rfl: 3    sildenafil (Viagra) 100 mg tablet, Take 1 tablet (100 mg) by mouth once daily as needed., Disp: , Rfl:     Allergies:   Allergies   Allergen Reactions    Fentanyl Palpitations and Unknown     rapid heart rate    Duloxetine GI Upset    Venlafaxine GI Upset    Methadone Palpitations       ROS:  Review of Systems   Constitutional:  Negative for activity change, appetite change and unexpected weight change.   HENT: Negative.     Eyes:  Negative.    Respiratory:  Negative for chest tightness and shortness of breath.    Cardiovascular:  Positive for leg swelling. Negative for chest pain and palpitations.   Gastrointestinal: Negative.    Endocrine: Negative.    Genitourinary: Negative.    Musculoskeletal: Negative.    Skin:  Positive for color change.   Allergic/Immunologic: Negative.    Neurological: Negative.    Hematological: Negative.    Psychiatric/Behavioral: Negative.       otherwise unremarkable    Labs:  Lab Results   Component Value Date    WBC 11.0 07/10/2024    WBC 15.1 (H) 2024    WBC 14.3 (H) 2024    HGB 12.3 (L) 07/10/2024    HGB 12.6 (L) 2024    HGB 12.8 (L) 2024    HCT 38.9 (L) 07/10/2024    HCT 38.6 (L) 2024    HCT 38.4 (L) 2024    MCV 90 07/10/2024    MCV 89 2024    MCV 88 2024     07/10/2024     Lab Results   Component Value Date    CREATININE 0.84 07/10/2024    CREATININE 1.07 2024    CREATININE 0.94 2024    BUN 13 07/10/2024    BUN 15 2024    BUN 21 2024     (L) 07/10/2024     (L) 2024     (L) 2024    K 3.8 07/10/2024    K 3.9 2024    K 3.8 2024     07/10/2024    CL 99 2024     2024    CO2 25 07/10/2024    CO2 22 2024    CO2 22 2024       Imaging:                  Assessment & Plan:  Rome Mullins is 64 y.o. male with history of cvi, ulceration history.  HTN, HLP, anxiety, MDD, a fib on eliquis, DM, VICENTE not using CPAP.  H/o fusion in lumbar region - Now numb from knees to toes - gets shooting pain down to toes depending on how he sits     Has been years with LDS - right worse than left;   Right leg: has had blisters and weeping - last was 3 mos ago; happened about 2 times  Left leg; none    Based on your leg symptoms, venous insufficiency studies and potential for clinical improvement, I am recommeding the followin) Right calf, tributaries Varithena   2) Please  bring your compression socks to the day of the procedure.   3) We will proceed with insurance approval and call you once we can proceed with scheduling your procedures.    Imaging and plan of care reviewed with Dr. Segun Barroso, DNP, APRN-CNP, AGPCNP-C, FNP-C, AGACNP-BC  Senior Nurse Practitioner, Vascular Surgery  [facility], [facility]  [address], Office [phone]

## 2024-08-22 NOTE — PATIENT INSTRUCTIONS
It was a pleasure taking care of you today and appreciate your seeing us at our [facility]    Based on your leg symptoms, venous insufficiency studies and potential for clinical improvement, I am recommeding the followin) Right calf, tributaries Varithena   2) Please bring your compression socks to the day of the procedure.   3) We will proceed with insurance approval and call you once we can proceed with scheduling your procedures.    Please call the office with any questions at [phone].   For Vein Center specific questions, particularly insurance related questions of booking your [place] intervention, you can call [phone] or email at [email or web address]    You can speak directly to my Vein Center Nurse Coordinator, Priti Moran, for specific questions.

## 2024-08-23 NOTE — PROGRESS NOTES
History of Present Illness:  Chief Complaint   Patient presents with    Left Wrist - Injury    Left Hand - Injury       Patient presents today for evaluation of left thumb/basilar thumb wrist pain that began 2024.  He was wrestling with his son and grandson when he had onset of left basilar thumb/radial sided wrist pain.  He describes continued aching pain in that area.  He was seen in the emergency room where there was some concern for trapezium fracture.  He was placed into a wrist brace that is not immobilizing his thumb or providing his thumb with any support.  Continues to have pain in that area that is worse with any movement as well as attempted gripping/pinching.    Past Medical History:   Diagnosis Date    Allergic     Anxiety     Arthritis 2023    Clotting disorder (Multi)     Depression     Difficulty walking     HL (hearing loss) 2023    Hypertension ?    Urinary tract infection 2015       Medication Documentation Review Audit       Reviewed by Maia Aj CMA (Medical Assistant) on 24 at 1337      Medication Order Taking? Sig Documenting Provider Last Dose Status   acetaminophen (Tylenol) 500 mg tablet [id number] No Take 2 tablets (1,000 mg) by mouth every 8 hours if needed. Historical Provider, MD Unknown Active   albuterol 90 mcg/actuation inhaler [id number] No Inhale 1-2 puffs every 6 hours if needed for wheezing or shortness of breath. Ramy Ellsworth MD Taking  24 [id number]   atorvastatin (Lipitor) 10 mg tablet [id number] No Take 1 tablet (10 mg) by mouth once daily. PEE Hendreson 2024 Active   Eliquis 5 mg tablet [id number] No Take 1 tablet by mouth twice daily with or without food. PEE Henderson 2024 Active   gabapentin (Neurontin) 600 mg tablet [id number] No TAKE ONE TABLET BY MOUTH THREE TIMES A DAY PEE Henderson 2024 Active   lisinopril 10 mg tablet [id number]  TAKE ONE TABLET BY MOUTH  EVERY DAY FOR 30 DAYS PEE Henderson  Active   metFORMIN (Glucophage) 500 mg tablet [id number] No Take 1 tablet (500 mg) by mouth once daily with breakfast. PEE Henderson 2024 Active   metoprolol tartrate (Lopressor) 25 mg tablet [id number]  TAKE ONE TABLET BY MOUTH TWICE A DAY WITH FOOD PEE Henderson  Active   omeprazole (PriLOSEC) 40 mg DR capsule [id number] No Take 1 capsule (40 mg) by mouth once daily. Historical Provider, MD 2024 Active   sertraline (Zoloft) 100 mg tablet [id number] No TAKE ONE TABLET BY MOUTH ONCE EVERY DAY PEE Henderson 2024 Active   sildenafil (Viagra) 100 mg tablet [id number] No Take 1 tablet (100 mg) by mouth once daily as needed. Historical Provider, MD Unknown Active                    Allergies   Allergen Reactions    Fentanyl Palpitations and Unknown     rapid heart rate    Duloxetine GI Upset    Venlafaxine GI Upset    Methadone Palpitations       Social History     Socioeconomic History    Marital status:      Spouse name: Not on file    Number of children: Not on file    Years of education: Not on file    Highest education level: Not on file   Occupational History    Not on file   Tobacco Use    Smoking status: Former     Current packs/day: 0.00     Average packs/day: 1 pack/day for 14.7 years (14.7 ttl pk-yrs)     Types: Cigarettes, Cigars     Start date: 1974     Quit date: 1989     Years since quittin.5     Passive exposure: Never    Smokeless tobacco: Former   Substance and Sexual Activity    Alcohol use: Yes     Alcohol/week: 1.0 standard drink of alcohol     Types: 1 Cans of beer per week     Comment: Sometimes 3    Drug use: Never    Sexual activity: Not Currently     Partners: Female   Other Topics Concern    Not on file   Social History Narrative    Not on file     Social Determinants of Health     Financial Resource Strain: Low Risk  (2024)    Overall Financial Resource Strain (CARDIA)      Difficulty of Paying Living Expenses: Not hard at all   Food Insecurity: Not on file   Transportation Needs: No Transportation Needs (7/9/2024)    PRAPARE - Transportation     Lack of Transportation (Medical): No     Lack of Transportation (Non-Medical): No   Physical Activity: Not on file   Stress: Not on file   Social Connections: Unknown (1/5/2024)    Social Connection and Isolation Panel [NHANES]     Frequency of Communication with Friends and Family: Not on file     Frequency of Social Gatherings with Friends and Family: Not on file     Attends Jew Services: Not on file     Active Member of Clubs or Organizations: Not on file     Attends Club or Organization Meetings: Not on file     Marital Status:    Intimate Partner Violence: Not At Risk (1/5/2024)    Humiliation, Afraid, Rape, and Kick questionnaire     Fear of Current or Ex-Partner: No     Emotionally Abused: No     Physically Abused: No     Sexually Abused: No   Housing Stability: Low Risk  (7/9/2024)    Housing Stability Vital Sign     Unable to Pay for Housing in the Last Year: No     Number of Places Lived in the Last Year: 1     Unstable Housing in the Last Year: No       Past Surgical History:   Procedure Laterality Date    BACK SURGERY  01/01/2002    CHOLECYSTECTOMY  01/01/2008    HERNIA REPAIR  01/01/2011    JOINT REPLACEMENT  01/01/2010    SPINE SURGERY  01/01/2002    TOENAIL EXCISION  2019    TONSILLECTOMY  01/01/1971    WISDOM TOOTH EXTRACTION  01/01/1998        Review of Systems   GENERAL: Negative for malaise, significant weight loss, fever  MUSCULOSKELETAL: see HPI  NEURO:  Negative     Physical Examination  Constitutional: Appears well-developed and well-nourished.  Head: Normocephalic and atraumatic.  Eyes: EOMI grossly  Cardiovascular: Intact distal pulses.   Respiratory: Effort normal. No respiratory distress.  Neurologic: Alert and oriented to person, place, and time.  Skin: Skin is warm and dry.  Hematologic / Lymphatic:  No lymphedema, lymphangitis.  Psychiatric: normal mood and affect. Behavior is normal.   Musculoskeletal:  Left hand: No edema.  No ecchymosis.  No tenderness about thumb A1 pulley.  MCP joint grossly stable.  Tenderness about thumb CMC joint with positive CMC grind and palpable crepitus.  No tenderness overlying first dorsal compartment.  2+ radial pulse.  Sensation grossly intact    Radiographs: Left hand radiographs ordered and available for my review/interpretation demonstrate thumb CMC/STT degenerative changes.  I do not appreciate any definite fracture on today's radiographs.  There is a slight lucency appreciated about the trapezium on the initial left hand radiographs from August 13, 2024.     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  Patient transitioned into thumb CMC Comfort Cool and prescription for topical anti-inflammatories has been sent to his pharmacy.  He will consider over-the-counter topical anti-inflammatory if insurance does not cover the prescription.  He will follow-up with me in about 4 weeks for clinical check.  If persistent symptoms may consider repeat radiographs.

## 2024-08-24 DIAGNOSIS — M54.50 LOW BACK PAIN WITH RADIATION: ICD-10-CM

## 2024-08-26 RX ORDER — GABAPENTIN 600 MG/1
600 TABLET ORAL 3 TIMES DAILY
Qty: 270 TABLET | Refills: 2 | Status: SHIPPED | OUTPATIENT
Start: 2024-08-26

## 2024-08-27 ENCOUNTER — LAB (OUTPATIENT)
Dept: LAB | Facility: LAB | Age: 64
End: 2024-08-27
Payer: COMMERCIAL

## 2024-08-27 DIAGNOSIS — Z96.641 HISTORY OF TOTAL RIGHT HIP REPLACEMENT: ICD-10-CM

## 2024-08-27 LAB
BASOPHILS # BLD AUTO: 0.08 X10*3/UL (ref 0–0.1)
BASOPHILS NFR BLD AUTO: 0.8 %
CRP SERPL-MCNC: 1.85 MG/DL
EOSINOPHIL # BLD AUTO: 0.41 X10*3/UL (ref 0–0.7)
EOSINOPHIL NFR BLD AUTO: 3.9 %
ERYTHROCYTE [DISTWIDTH] IN BLOOD BY AUTOMATED COUNT: 13.7 % (ref 11.5–14.5)
ERYTHROCYTE [SEDIMENTATION RATE] IN BLOOD BY WESTERGREN METHOD: 62 MM/H (ref 0–20)
HCT VFR BLD AUTO: 42.6 % (ref 41–52)
HGB BLD-MCNC: 13.5 G/DL (ref 13.5–17.5)
IMM GRANULOCYTES # BLD AUTO: 0.06 X10*3/UL (ref 0–0.7)
IMM GRANULOCYTES NFR BLD AUTO: 0.6 % (ref 0–0.9)
LYMPHOCYTES # BLD AUTO: 3.22 X10*3/UL (ref 1.2–4.8)
LYMPHOCYTES NFR BLD AUTO: 30.7 %
MCH RBC QN AUTO: 28.7 PG (ref 26–34)
MCHC RBC AUTO-ENTMCNC: 31.7 G/DL (ref 32–36)
MCV RBC AUTO: 91 FL (ref 80–100)
MONOCYTES # BLD AUTO: 1.06 X10*3/UL (ref 0.1–1)
MONOCYTES NFR BLD AUTO: 10.1 %
NEUTROPHILS # BLD AUTO: 5.66 X10*3/UL (ref 1.2–7.7)
NEUTROPHILS NFR BLD AUTO: 53.9 %
NRBC BLD-RTO: 0 /100 WBCS (ref 0–0)
PLATELET # BLD AUTO: 440 X10*3/UL (ref 150–450)
RBC # BLD AUTO: 4.7 X10*6/UL (ref 4.5–5.9)
WBC # BLD AUTO: 10.5 X10*3/UL (ref 4.4–11.3)

## 2024-08-27 PROCEDURE — 36415 COLL VENOUS BLD VENIPUNCTURE: CPT

## 2024-09-16 ENCOUNTER — APPOINTMENT (OUTPATIENT)
Dept: ORTHOPEDIC SURGERY | Facility: CLINIC | Age: 64
End: 2024-09-16
Payer: COMMERCIAL

## 2024-09-16 DIAGNOSIS — M19.049 CMC ARTHRITIS: Primary | ICD-10-CM

## 2024-09-16 PROCEDURE — 1036F TOBACCO NON-USER: CPT | Performed by: ORTHOPAEDIC SURGERY

## 2024-09-16 PROCEDURE — 20600 DRAIN/INJ JOINT/BURSA W/O US: CPT | Performed by: ORTHOPAEDIC SURGERY

## 2024-09-16 PROCEDURE — 99214 OFFICE O/P EST MOD 30 MIN: CPT | Performed by: ORTHOPAEDIC SURGERY

## 2024-09-16 RX ORDER — LIDOCAINE HYDROCHLORIDE 10 MG/ML
0.5 INJECTION, SOLUTION INFILTRATION; PERINEURAL
Status: COMPLETED | OUTPATIENT
Start: 2024-09-16 | End: 2024-09-16

## 2024-09-16 RX ORDER — TRIAMCINOLONE ACETONIDE 40 MG/ML
20 INJECTION, SUSPENSION INTRA-ARTICULAR; INTRAMUSCULAR
Status: COMPLETED | OUTPATIENT
Start: 2024-09-16 | End: 2024-09-16

## 2024-09-16 ASSESSMENT — PAIN SCALES - GENERAL: PAINLEVEL_OUTOF10: 9

## 2024-09-16 ASSESSMENT — PAIN - FUNCTIONAL ASSESSMENT: PAIN_FUNCTIONAL_ASSESSMENT: 0-10

## 2024-09-17 NOTE — PROGRESS NOTES
History of Present Illness:  Chief Complaint   Patient presents with    Left Hand - Follow-up     Left thumb CMC/STT arthritis      64-year-old male last seen a few weeks ago for evaluation of left thumb CMC/STT arthritis.  He began wearing a CMC Comfort Cool brace that has not significantly improved his symptoms.  Symptoms are worse with gripping and pinching.  Describes a sharp pain as well as a constant aching pain.    Past Medical History:   Diagnosis Date    Allergic     Anxiety     Arthritis 2023    Clotting disorder (Multi)     Depression     Difficulty walking     HL (hearing loss) 2023    Hypertension ?    Urinary tract infection 2015       Medication Documentation Review Audit       Reviewed by Jens Shore MD (Physician) on 24 at 2227      Medication Order Taking? Sig Documenting Provider Last Dose Status   acetaminophen (Tylenol) 500 mg tablet [id number] No Take 2 tablets (1,000 mg) by mouth every 8 hours if needed. Historical Provider, MD Unknown Active   albuterol 90 mcg/actuation inhaler [id number] No Inhale 1-2 puffs every 6 hours if needed for wheezing or shortness of breath. Ramy Ellsworth MD Taking  24 2359   atorvastatin (Lipitor) 10 mg tablet [id number] No Take 1 tablet (10 mg) by mouth once daily. PEE Henderson 2024 Active   diclofenac sodium 3 % gel [id number]  Apply topically 2 times a day. As needed Jens Shore MD  Active   Eliquis 5 mg tablet [id number] No Take 1 tablet by mouth twice daily with or without food. PEE Henderson 2024 Active   gabapentin (Neurontin) 600 mg tablet [id number] No TAKE ONE TABLET BY MOUTH THREE TIMES A DAY PEE Henderson 2024 Active   lisinopril 10 mg tablet [id number]  TAKE ONE TABLET BY MOUTH EVERY DAY FOR 30 DAYS PEE Henderson  Active   metFORMIN (Glucophage) 500 mg tablet [id number] No Take 1 tablet (500 mg) by mouth once daily with  breakfast. PEE Henderson 2024 Active   metoprolol tartrate (Lopressor) 25 mg tablet [id number]  TAKE ONE TABLET BY MOUTH TWICE A DAY WITH FOOD PEE Henderson  Active   omeprazole (PriLOSEC) 40 mg DR capsule [id number] No Take 1 capsule (40 mg) by mouth once daily. Historical Provider, MD 2024 Active   sertraline (Zoloft) 100 mg tablet [id number] No TAKE ONE TABLET BY MOUTH ONCE EVERY DAY PEE Henderson 2024 Active   sildenafil (Viagra) 100 mg tablet [id number] No Take 1 tablet (100 mg) by mouth once daily as needed. Historical Provider, MD Unknown Active                    Allergies   Allergen Reactions    Fentanyl Palpitations and Unknown     rapid heart rate    Duloxetine GI Upset    Venlafaxine GI Upset    Methadone Palpitations       Social History     Socioeconomic History    Marital status:      Spouse name: Not on file    Number of children: Not on file    Years of education: Not on file    Highest education level: Not on file   Occupational History    Not on file   Tobacco Use    Smoking status: Former     Current packs/day: 0.00     Average packs/day: 1 pack/day for 14.7 years (14.7 ttl pk-yrs)     Types: Cigarettes, Cigars     Start date: 1974     Quit date: 1989     Years since quittin.6     Passive exposure: Never    Smokeless tobacco: Former   Substance and Sexual Activity    Alcohol use: Yes     Alcohol/week: 1.0 standard drink of alcohol     Types: 1 Cans of beer per week     Comment: Sometimes 3    Drug use: Never    Sexual activity: Not Currently     Partners: Female   Other Topics Concern    Not on file   Social History Narrative    Not on file     Social Determinants of Health     Financial Resource Strain: Low Risk  (2024)    Overall Financial Resource Strain (CARDIA)     Difficulty of Paying Living Expenses: Not hard at all   Food Insecurity: Not on file   Transportation Needs: No Transportation Needs (2024)    PRAPARE -  Transportation     Lack of Transportation (Medical): No     Lack of Transportation (Non-Medical): No   Physical Activity: Not on file   Stress: Not on file   Social Connections: Unknown (1/5/2024)    Social Connection and Isolation Panel [NHANES]     Frequency of Communication with Friends and Family: Not on file     Frequency of Social Gatherings with Friends and Family: Not on file     Attends Restoration Services: Not on file     Active Member of Clubs or Organizations: Not on file     Attends Club or Organization Meetings: Not on file     Marital Status:    Intimate Partner Violence: Not At Risk (1/5/2024)    Humiliation, Afraid, Rape, and Kick questionnaire     Fear of Current or Ex-Partner: No     Emotionally Abused: No     Physically Abused: No     Sexually Abused: No   Housing Stability: Low Risk  (7/9/2024)    Housing Stability Vital Sign     Unable to Pay for Housing in the Last Year: No     Number of Places Lived in the Last Year: 1     Unstable Housing in the Last Year: No       Past Surgical History:   Procedure Laterality Date    BACK SURGERY  01/01/2002    CHOLECYSTECTOMY  01/01/2008    HERNIA REPAIR  01/01/2011    JOINT REPLACEMENT  01/01/2010    SPINE SURGERY  01/01/2002    TOENAIL EXCISION  2019    TONSILLECTOMY  01/01/1971    WISDOM TOOTH EXTRACTION  01/01/1998        Review of Systems   GENERAL: Negative for malaise, significant weight loss, fever  MUSCULOSKELETAL: see HPI  NEURO:  Negative     Physical Examination  Constitutional: Appears well-developed and well-nourished.  Head: Normocephalic and atraumatic.  Eyes: EOMI grossly  Cardiovascular: Intact distal pulses.   Respiratory: Effort normal. No respiratory distress.  Neurologic: Alert and oriented to person, place, and time.  Skin: Skin is warm and dry.  Hematologic / Lymphatic: No lymphedema, lymphangitis.  Psychiatric: normal mood and affect. Behavior is normal.   Musculoskeletal:  Left hand: No ecchymosis.  No tenderness about thumb  A1 pulley.  MCP joint grossly stable.  Significant tenderness about thumb CMC joint with positive CMC grind and palpable crepitus.  No tenderness overlying first dorsal compartment.  2+ radial pulse.  Sensation grossly intact.       Assessment:  Patient with aggravation of left thumb CMC/STT arthritis after injury     Plan:  We once again reviewed risks and benefits of various treatment options.  Given his persistent pain he is interested in proceeding with corticosteroid injection.  We did discuss potentially obtaining additional imaging prior to injection, but ultimately after reviewing potential outcomes with patient and he wished to pursue injection.  Reviewed likelihood of elevated blood glucose levels for up to 5 days given history of diabetes.  He will follow-up if persistent/worsening symptoms.    Patient ID: Rome Mullins is a 64 y.o. male.    S Inj/Asp: L thumb CMC on 9/16/2024 9:04 PM  Indications: pain  Details: 25 G needle (dorsoradial) approach  Medications: 20 mg triamcinolone acetonide 40 mg/mL; 0.5 mL lidocaine 10 mg/mL (1 %)  Outcome: tolerated well, no immediate complications  Procedure, treatment alternatives, risks and benefits explained, specific risks discussed. Consent was given by the patient. Immediately prior to procedure a time out was called to verify the correct patient, procedure, equipment, support staff and site/side marked as required. Patient was prepped and draped in the usual sterile fashion.

## 2024-09-24 ENCOUNTER — APPOINTMENT (OUTPATIENT)
Dept: RADIOLOGY | Facility: HOSPITAL | Age: 64
End: 2024-09-24
Payer: COMMERCIAL

## 2024-09-29 ENCOUNTER — HOSPITAL ENCOUNTER (OUTPATIENT)
Dept: RADIOLOGY | Facility: HOSPITAL | Age: 64
Discharge: HOME | End: 2024-09-29
Payer: COMMERCIAL

## 2024-09-29 DIAGNOSIS — Z96.649 HIP PAIN ASSOCIATED WITH RECALLED TOTAL HIP ARTHROPLASTY HARDWARE, INITIAL ENCOUNTER (CMS-HCC): ICD-10-CM

## 2024-09-29 DIAGNOSIS — T84.84XA HIP PAIN ASSOCIATED WITH RECALLED TOTAL HIP ARTHROPLASTY HARDWARE, INITIAL ENCOUNTER (CMS-HCC): ICD-10-CM

## 2024-09-29 DIAGNOSIS — Z96.641 HISTORY OF TOTAL RIGHT HIP REPLACEMENT: ICD-10-CM

## 2024-09-29 PROCEDURE — 73721 MRI JNT OF LWR EXTRE W/O DYE: CPT | Mod: RIGHT SIDE | Performed by: STUDENT IN AN ORGANIZED HEALTH CARE EDUCATION/TRAINING PROGRAM

## 2024-09-29 PROCEDURE — 73721 MRI JNT OF LWR EXTRE W/O DYE: CPT | Mod: RT

## 2024-09-30 ENCOUNTER — APPOINTMENT (OUTPATIENT)
Dept: PULMONOLOGY | Facility: CLINIC | Age: 64
End: 2024-09-30
Payer: COMMERCIAL

## 2024-09-30 VITALS
DIASTOLIC BLOOD PRESSURE: 73 MMHG | SYSTOLIC BLOOD PRESSURE: 148 MMHG | OXYGEN SATURATION: 92 % | BODY MASS INDEX: 36.99 KG/M2 | WEIGHT: 280.4 LBS | HEART RATE: 63 BPM

## 2024-09-30 DIAGNOSIS — Z86.711 HISTORY OF PULMONARY EMBOLISM: ICD-10-CM

## 2024-09-30 DIAGNOSIS — Z96.641 HISTORY OF TOTAL RIGHT HIP REPLACEMENT: ICD-10-CM

## 2024-09-30 DIAGNOSIS — R06.02 SOB (SHORTNESS OF BREATH): Primary | ICD-10-CM

## 2024-09-30 DIAGNOSIS — G47.33 OBSTRUCTIVE SLEEP APNEA SYNDROME: ICD-10-CM

## 2024-09-30 DIAGNOSIS — G47.34 SLEEP RELATED HYPOXIA: ICD-10-CM

## 2024-09-30 PROCEDURE — 1036F TOBACCO NON-USER: CPT | Performed by: NURSE PRACTITIONER

## 2024-09-30 PROCEDURE — 3078F DIAST BP <80 MM HG: CPT | Performed by: NURSE PRACTITIONER

## 2024-09-30 PROCEDURE — 99214 OFFICE O/P EST MOD 30 MIN: CPT | Performed by: NURSE PRACTITIONER

## 2024-09-30 PROCEDURE — 3077F SYST BP >= 140 MM HG: CPT | Performed by: NURSE PRACTITIONER

## 2024-09-30 NOTE — PROGRESS NOTES
Subjective   Patient ID: Rome Mullins is a 64 y.o. male who presents for No chief complaint on file..  HPI  HPI: He is here today to establish care. Patient had COVID and was hospitalized for 1 month at [facility]. He was sent home on 4 L of oxygen which she still uses with sleep. He complains of an ongoing cough since having COVID. Cough is wet sounding but is mostly dry. He also complains of fatigue. He did have a sleep study many years ago had sleep apnea did use a CPAP but discontinued because of claustrophobia with the large mask. Patient is very willing to have a new sleep study and try CPAP again with a small nasal mask. He also complains of some postnasal drip which could be contributing to his cough I will send in Flonase and Singulair. I would also like him to have a post-COVID chest CT since patient still complains of some symptoms.    08/24/2022 he is here today for follow-up. He had a chest CT which shows improvement from the previous chest CT which showed pneumatic consolidations with groundglass opacities. He does have several subcentimeter nodules could be infectious or inflammatory. I will repeat a CT in 1 year. He complains of some shortness of breath and coughing. I would like to order him a PFT to evaluate for further lung function. He did have a sleep study sleep study shows a AHI of 32.5 SPO2 reza 74%. Oxygen was not used during the study he did spend 12.8 minutes below 88%. He does use 2 L of oxygen at home this was started in the hospital. I would actually like him to return for a titration study so that we can evaluate for CPAP and is well for oxygen and then if it is not needed we can discontinue it. He would like a nasal mask as he states he has claustrophobia.    10/12/22 he is here today for follow-up. He had his sleep study back in July and unfortunately insurance denied a titration study. In lieu I will order him an auto CPAP and I will have to do a nocturnal pulse oximetry study on  his CPAP since patient uses 2 L of oxygen with sleep at home. Patient and I discussed this and he is very agreeable to it. He has claustrophobia so I am going to order him a very small mask. He also had a PFT which shows an FEV1 of 73% he had some restriction in his airways. Patient's biggest complaint today was fatigue which should improve significantly once he starts CPAP. We went over CPAP today I showed him what they look like we discussed care and use. Patient is very willing to try CPAP.    12/14/22 he is here today for follow-up with his new CPAP. He is doing well he is used it 42 out of the last 45 nights his average is 4.6 hours and his AHI is 0.4. He only sleeps around 4 hours a night. We had a talk today about trying to sleep longer. He agrees and he states that part of his problem is he stays up late on electronics. We really discussed putting those away. He is also complaining of coughing with congestion and sinus pain and pressure. He says this is been going on about a week. I will go ahead and treat this for him today. His oxygen was started at 2 L on his CPAP.    03/15/23 he is here today for follow-up from hospitalization. He tells me he got sick he was sick for about a month and ended up with chest pain and sudden onset shortness of breath he then went to [facility] ER was found to have a PE. He also had pneumonia. He was admitted for a week he is now back home and is returning to baseline. He is on Eliquis. This is the second time he has had a PE. I would like him to see hematology/oncology for follow-up. He will need a chest CT for PE in 6 months. Patient does well with his BiPAP with 2 L of oxygen. 10 minutes spent preparing patient's chart. 35 minutes spent with patient answering questions and determining care. 10 minutes spent charting and ordering tests and medications    09/27/23 he is here today for follow-up. He had a chest CT for PE which shows resolution of his PE. He is seeing hematology  and they have recommended that he remain on anticoagulation for life. He does have a history of A-fib dating back about 10 years ago. He tells me at that time they told him he did not need to be on anticoagulation. He has not seen a cardiologist since. I am going to give him a referral today. He uses his CPAP every night for 6.2 hours AHI 0.7. He does use 2 L with his CPAP. Patient is also looking for a general surgeon for hemorrhoids and a colonoscopy. I will refer him to Dr. Tyler. 5 minutes spent preparing patient's chart. 30 minutes spent with patient answering questions and determining care. 10 minutes spent charting and ordering tests and medications     03/28/24 He is here today for follow up. He has been doing well. He continues to use his cpap. He uses his 2L with his cpap. He continues on eliquis and see's hematology.   09/30/24 he is here today for follow-up.  Unfortunately he had pneumonia 2 times over the last year.  He tells me he was very short of breath during this time.  He was a smoker and quit 1989 but he had smoked 1 pack a day for about 14 years.  I want to do a PFT on him to check his lung function.  He is compliant with his CPAP.  He uses it every night       Previous pulmonary history:   He has no history of recurrent infections, or lung disease as a child. He had no previous lung hx, never on oxygen or inhaler therapy. He was previously told he has . He currently is on no supplemental oxygen. He has never been to pulmonary rehab. Does not recall having AECOPD requiring antibiotics or prednisone.     Inhalers/nebulized medications:      Hospitalization History:  He has not been hospitalized over the last year for breathing related problem.     Sleep history:  Admits to feeling tired during the day.  Complains of snoring, ? Apneas. Feels tired during the day. D  STOP-BANG score of 6     Comorbidities:  Fibromyalgia  Post laminectomy    SH:  smoking: Very minimal smoking history  drinking:  none  illicit drug use: none     Occupation: (Full questionnaire on exposures obtained, discussed with the patient and scanned to EMR)  worked as   No known exposure to asbestos, silica or beryllium     Family History:  No family history of lung diseases or cancer     Imaging history: (I have personally reviewed the imaging below)     PFTs:  // -> FEV1/FVC ratio/FEV1 (no BD response)/FVC/DLCO /TLC/RV to TLC ratio     6 MWTs: None on record         Review of Systems   All other systems reviewed and are negative.      Objective   Physical Exam  Vitals and nursing note reviewed.   Constitutional:       Appearance: Normal appearance. He is obese.   HENT:      Head: Normocephalic.      Nose: Nose normal.   Eyes:      Pupils: Pupils are equal, round, and reactive to light.   Cardiovascular:      Rate and Rhythm: Normal rate.   Pulmonary:      Effort: Pulmonary effort is normal.      Breath sounds: Normal breath sounds.   Neurological:      General: No focal deficit present.      Mental Status: He is alert and oriented to person, place, and time. Mental status is at baseline.   Psychiatric:         Mood and Affect: Mood normal.         Behavior: Behavior normal.         Thought Content: Thought content normal.         Judgment: Judgment normal.         Assessment/Plan     9/30/24 he was hospitalized twice with pna. He got better was discharge and then returned ill w/in a few weeks. He uses his cpap most nights for 5.5 hours and AHI is 0.. sometimes he falls asleep in his chair. He used to smoke but quit in 1989 he smoked 1 ppd for 14 years. I will order a PFT especially since had pna         # Post COVID   - He had COVID and was hospitalized for 1 month at [facility]   - He was discharged on oxygen   - He still has an ongoing cough  -Order a chest CT for post-COVID syndrome -shows resolution of the airspace consolidations from previous chest CT but also shows several subcentimeter pulmonary nodules   - I will  "order a PFT for some sob    - PFT showed restriction in his airways    # Chronic respiratory failure   - He is on 4L since being hospitalized with COVID   - No longer uses oxygen during the day but still uses 2 L with sleep  09/27/23 he continues with 2 L of oxygen with his CPAP    # Sleep disturbance with snoring:   - Patient needs an in lab study since he currently uses 4 L of oxygen  -He was diagnosed with sleep apnea many years ago he did have a CPAP he discontinued it because of claustrophobia  -STOP-BANG score of 6  -High risk for VICENTE  -Split sleep study ordered.    # PE   - Hospitalized last week at [facility] for a PE   -This is the second time patient has had a PE   -He is now on Eliquis   -He will need a chest CT for PE in 6 months   -I am going to give him information for hematology/oncology  09/27/23 he continues to see hematology and will be on lifelong anticoagulation    # Pneumonia   - He was hospitalized with pneumonia last week   -Today with IV antibiotics   - Feeling much better    # VICENTE:   -He had a sleep study on 07/12/22   - His AHI was 32.5 his SPO2 reza was 74%. He did not meet criteria for a split-night study.   - Because he uses oxygen with sleep I would like him to return to the sleep lab for a CPAP titration study. Patient also is concerned about finding the correct mask because he has claustrophobia.  -counseled to avoid supine/ sleeping. Can buy \"[employer]\" online to help.  -discussed avoiding compliance measures, avoiding sedatives and alcohol and caution driving and operating machinery  10/12/22 patient was denied a titration study through her insurance. I will order him an auto CPAP 5 to 20 cm H2O pressure with a small nasal mask. He has claustrophobia. He will also need a nocturnal pulse oximetry study on CPAP since patient has 2 L of oxygen he currently uses with sleep.  12/14/22 he is using his CPAP 42 out of 45 nights for 4.6 hours AHI 0.4. He did qualify for 2 L of oxygen with " sleep  -Obesity: discussed the importance of weight loss    09/27/23 he uses his CPAP every night for 6.2 hours AHI 0.7  03/28/24 cpap  is set from 5-12, 95th pressure is 9 average use is 5.5 hours and Ahi is 0.6, he gets supplies regularly.   09/30/24 he uses it most night for 5.5 hours with 2l 02     #PND   - I am sending in a nasal spray and some Singulair for you to use to help with your postnasal drip   - He still has postnasal drip we will discontinue the Singulair and try levocetirizine at bedtime and then I encouraged him to use the Flonase morning and night and to use it regularly since he did feel that it helps but he is just not using it consistently.    # Obesity/deconditioning:  -counseled on the importance of diet, exercise and weight loss     Mr. Mullins it was a pleasure seeing you in the office today. We discussed the following:      - Great job with your CPAP, you are off to a good start   - Continue the oxygen at 2 L with your CPAP     - Please continue your Eliquis   - Please continue to see hematology/oncology   - Please make an appointment with Cardiology for your history of afib       Follow-up in 6 months    Follow up with Dr. Mckeon for your PFT results, from your sob with frequent pna   Follow up in 1 year with your CPAP with 02 and see Dr. Evelyn Mcgowan, APRN-CNP 09/30/24 3:42 PM

## 2024-09-30 NOTE — PATIENT INSTRUCTIONS
Follow-up in 6 months    Follow up with Dr. Mckeon for your PFT results, from your sob with frequent pna   Follow up in 1 year with your CPAP with 02 and see Dr. Rivas

## 2024-10-01 ENCOUNTER — TELEPHONE (OUTPATIENT)
Dept: ORTHOPEDIC SURGERY | Facility: CLINIC | Age: 64
End: 2024-10-01

## 2024-10-01 ENCOUNTER — LAB (OUTPATIENT)
Dept: LAB | Facility: LAB | Age: 64
End: 2024-10-01
Payer: COMMERCIAL

## 2024-10-01 DIAGNOSIS — Z96.641 HISTORY OF TOTAL RIGHT HIP REPLACEMENT: ICD-10-CM

## 2024-10-01 DIAGNOSIS — S69.92XA INJURY OF LEFT THUMB, INITIAL ENCOUNTER: ICD-10-CM

## 2024-10-01 PROCEDURE — 82495 ASSAY OF CHROMIUM: CPT

## 2024-10-01 PROCEDURE — 36415 COLL VENOUS BLD VENIPUNCTURE: CPT

## 2024-10-01 PROCEDURE — 83018 HEAVY METAL QUAN EACH NES: CPT

## 2024-10-01 NOTE — TELEPHONE ENCOUNTER
09/16/24 LT THUMB CMC/STT ARTHRITIS  Patient has received little to no relief with the cortisone injection. He states that they discussed Dr. Shore possibly ordering a MRI. Patient wants to know if he can order that or does he have to come back in to the office? I do not see anything in the note regarding this.

## 2024-10-04 LAB
COBALT SERPL-MCNC: <1 UG/L
CR SERPL-MCNC: 1 UG/L

## 2024-10-08 DIAGNOSIS — T84.84XA HIP PAIN ASSOCIATED WITH RECALLED TOTAL HIP ARTHROPLASTY HARDWARE, INITIAL ENCOUNTER (CMS-HCC): ICD-10-CM

## 2024-10-08 DIAGNOSIS — Z96.649 HIP PAIN ASSOCIATED WITH RECALLED TOTAL HIP ARTHROPLASTY HARDWARE, INITIAL ENCOUNTER (CMS-HCC): ICD-10-CM

## 2024-10-14 ENCOUNTER — OFFICE VISIT (OUTPATIENT)
Dept: PRIMARY CARE | Facility: CLINIC | Age: 64
End: 2024-10-14
Payer: COMMERCIAL

## 2024-10-14 VITALS
SYSTOLIC BLOOD PRESSURE: 138 MMHG | TEMPERATURE: 97.7 F | DIASTOLIC BLOOD PRESSURE: 84 MMHG | WEIGHT: 280 LBS | OXYGEN SATURATION: 97 % | HEIGHT: 73 IN | HEART RATE: 60 BPM | BODY MASS INDEX: 37.11 KG/M2

## 2024-10-14 DIAGNOSIS — E78.2 MIXED HYPERLIPIDEMIA: ICD-10-CM

## 2024-10-14 DIAGNOSIS — Z23 ENCOUNTER FOR IMMUNIZATION: ICD-10-CM

## 2024-10-14 DIAGNOSIS — F41.8 MIXED ANXIETY AND DEPRESSIVE DISORDER: ICD-10-CM

## 2024-10-14 DIAGNOSIS — I48.0 PAROXYSMAL ATRIAL FIBRILLATION (MULTI): ICD-10-CM

## 2024-10-14 DIAGNOSIS — E11.9 TYPE 2 DIABETES MELLITUS WITHOUT COMPLICATION, WITHOUT LONG-TERM CURRENT USE OF INSULIN (MULTI): Primary | ICD-10-CM

## 2024-10-14 DIAGNOSIS — I10 PRIMARY HYPERTENSION: ICD-10-CM

## 2024-10-14 DIAGNOSIS — N52.9 VASCULOGENIC ERECTILE DYSFUNCTION, UNSPECIFIED VASCULOGENIC ERECTILE DYSFUNCTION TYPE: ICD-10-CM

## 2024-10-14 DIAGNOSIS — K21.9 GASTROESOPHAGEAL REFLUX DISEASE WITHOUT ESOPHAGITIS: ICD-10-CM

## 2024-10-14 PROBLEM — Z78.9 FAILURE OF OUTPATIENT TREATMENT: Status: RESOLVED | Noted: 2024-01-04 | Resolved: 2024-10-14

## 2024-10-14 PROBLEM — E66.01 MORBID OBESITY DUE TO EXCESS CALORIES (MULTI): Status: RESOLVED | Noted: 2023-09-09 | Resolved: 2024-10-14

## 2024-10-14 PROBLEM — R06.02 SOB (SHORTNESS OF BREATH): Status: RESOLVED | Noted: 2023-09-09 | Resolved: 2024-10-14

## 2024-10-14 PROBLEM — R73.02 IMPAIRED GLUCOSE TOLERANCE: Status: RESOLVED | Noted: 2023-09-09 | Resolved: 2024-10-14

## 2024-10-14 PROBLEM — L03.90 CELLULITIS: Status: RESOLVED | Noted: 2024-01-04 | Resolved: 2024-10-14

## 2024-10-14 PROBLEM — R60.0 LEG EDEMA: Status: RESOLVED | Noted: 2023-09-09 | Resolved: 2024-10-14

## 2024-10-14 LAB — POC HEMOGLOBIN A1C: 6.8 % (ref 4.2–6.5)

## 2024-10-14 PROCEDURE — 3044F HG A1C LEVEL LT 7.0%: CPT | Performed by: NURSE PRACTITIONER

## 2024-10-14 PROCEDURE — 90472 IMMUNIZATION ADMIN EACH ADD: CPT | Performed by: NURSE PRACTITIONER

## 2024-10-14 PROCEDURE — 3049F LDL-C 100-129 MG/DL: CPT | Performed by: NURSE PRACTITIONER

## 2024-10-14 PROCEDURE — 3075F SYST BP GE 130 - 139MM HG: CPT | Performed by: NURSE PRACTITIONER

## 2024-10-14 PROCEDURE — 83036 HEMOGLOBIN GLYCOSYLATED A1C: CPT | Performed by: NURSE PRACTITIONER

## 2024-10-14 PROCEDURE — 3079F DIAST BP 80-89 MM HG: CPT | Performed by: NURSE PRACTITIONER

## 2024-10-14 PROCEDURE — 4010F ACE/ARB THERAPY RXD/TAKEN: CPT | Performed by: NURSE PRACTITIONER

## 2024-10-14 PROCEDURE — 90715 TDAP VACCINE 7 YRS/> IM: CPT | Performed by: NURSE PRACTITIONER

## 2024-10-14 PROCEDURE — 1036F TOBACCO NON-USER: CPT | Performed by: NURSE PRACTITIONER

## 2024-10-14 PROCEDURE — 90471 IMMUNIZATION ADMIN: CPT | Performed by: NURSE PRACTITIONER

## 2024-10-14 PROCEDURE — 3008F BODY MASS INDEX DOCD: CPT | Performed by: NURSE PRACTITIONER

## 2024-10-14 PROCEDURE — 90656 IIV3 VACC NO PRSV 0.5 ML IM: CPT | Performed by: NURSE PRACTITIONER

## 2024-10-14 PROCEDURE — 99214 OFFICE O/P EST MOD 30 MIN: CPT | Performed by: NURSE PRACTITIONER

## 2024-10-14 PROCEDURE — 92229 IMG RTA DETC/MNTR DS POC ALY: CPT | Performed by: NURSE PRACTITIONER

## 2024-10-14 PROCEDURE — 2033F EYE IMG VALID W/O RTNOPTHY: CPT | Performed by: NURSE PRACTITIONER

## 2024-10-14 PROCEDURE — 3061F NEG MICROALBUMINURIA REV: CPT | Performed by: NURSE PRACTITIONER

## 2024-10-14 ASSESSMENT — ENCOUNTER SYMPTOMS
DIAPHORESIS: 0
LOSS OF SENSATION IN FEET: 0
FEVER: 0
CHEST TIGHTNESS: 0
PALPITATIONS: 0
POLYPHAGIA: 0
NAUSEA: 0
BLOOD IN STOOL: 0
OCCASIONAL FEELINGS OF UNSTEADINESS: 0
CONFUSION: 0
AGITATION: 0
POLYDIPSIA: 0
COUGH: 0
CHILLS: 0
SHORTNESS OF BREATH: 0
FATIGUE: 0
DEPRESSION: 0
VOMITING: 0
ABDOMINAL PAIN: 0

## 2024-10-14 ASSESSMENT — LIFESTYLE VARIABLES
HOW OFTEN DURING THE LAST YEAR HAVE YOU HAD A FEELING OF GUILT OR REMORSE AFTER DRINKING: NEVER
HAVE YOU OR SOMEONE ELSE BEEN INJURED AS A RESULT OF YOUR DRINKING: NO
HAS A RELATIVE, FRIEND, DOCTOR, OR ANOTHER HEALTH PROFESSIONAL EXPRESSED CONCERN ABOUT YOUR DRINKING OR SUGGESTED YOU CUT DOWN: NO
SKIP TO QUESTIONS 9-10: 1
HOW MANY STANDARD DRINKS CONTAINING ALCOHOL DO YOU HAVE ON A TYPICAL DAY: 1 OR 2
HOW OFTEN DO YOU HAVE A DRINK CONTAINING ALCOHOL: 2-4 TIMES A MONTH
HOW OFTEN DURING THE LAST YEAR HAVE YOU BEEN UNABLE TO REMEMBER WHAT HAPPENED THE NIGHT BEFORE BECAUSE YOU HAD BEEN DRINKING: NEVER
HOW OFTEN DURING THE LAST YEAR HAVE YOU FAILED TO DO WHAT WAS NORMALLY EXPECTED FROM YOU BECAUSE OF DRINKING: NEVER
AUDIT-C TOTAL SCORE: 2
HOW OFTEN DURING THE LAST YEAR HAVE YOU NEEDED AN ALCOHOLIC DRINK FIRST THING IN THE MORNING TO GET YOURSELF GOING AFTER A NIGHT OF HEAVY DRINKING: NEVER
AUDIT TOTAL SCORE: 2
HOW OFTEN DURING THE LAST YEAR HAVE YOU FOUND THAT YOU WERE NOT ABLE TO STOP DRINKING ONCE YOU HAD STARTED: NEVER
HOW OFTEN DO YOU HAVE SIX OR MORE DRINKS ON ONE OCCASION: NEVER

## 2024-10-14 ASSESSMENT — PAIN SCALES - GENERAL: PAINLEVEL: 7

## 2024-10-14 NOTE — PROGRESS NOTES
[facility]: [facility]  PROGRESS NOTE      Rome Mullins is a 64 y.o. male that is presenting today for 6 month follow up.    Mr. Mullins reports taking metformin as directed. He is trying to follow a low sugar and carbohydrate diet. He is not monitoring home BG levels. Denies episodes of hyper/hypoglycemia, polydipsia, polyuria or polyphagia.  He is due for retinopathy screen. A1c today 6.8 unchanged compared to  07/09/24 A1c 6.8.    He reports taking antihypertensives as directed. He is trying to follow a low sodium diet. He is not monitoring home BP. Denies CP, SOB, dizziness, syncope of HA.    He reports tolerating statin. Denies statin related abdominal pain, myalgias or arthralgias.    GERD is managed with daily PPI. He denies epigastric pain, heartburn, acid reflux, N/V, blood in stool.    Anxiety is managed well with SSRI. He is not currently involved with either individual or group therapy.      Assessment/Plan   Diagnoses and all orders for this visit:    Type 2 diabetes mellitus without complication, without long-term current use of insulin (Multi)  -     Stable. Encouraged increased lifestyle modification efforts.  -     POCT glycosylated hemoglobin (Hb A1C) manually resulted  -     metformin 500 mg daily    Primary hypertension        -     Acceptable control. Encouraged increased lifestyle modification efforts to include low sodium diet, weight management and regularly engage in aerobic activity.        -     lisinopril 10 mg daily        -     metoprolol tartrate 25 mg daily    Mixed hyperlipidemia        -     tolerating statin        -     ASCVD Risk 27.4%        -     atorvastatin 10 mg daily    Mixed anxiety and depressive disorder        -     stable on SSRI        -     sertraline 100 mg daily    Vasculogenic erectile dysfunction, unspecified vasculogenic erectile dysfunction type        -     PDE5 inhibitor effective        -     sildenafil 100 mg as  needed    Gastroesophageal reflux disease without esophagitis        -     Stable on PPI        -     omeprazole 40 mg daily    Paroxysmal atrial fibrillation (Multi)        -     eliquis 5 mg twice daily    Encounter for immunization  -     Flu vaccine, trivalent, preservative free, age 6 months and greater (Fluarix/Fluzone/Flulaval)  -     Tdap vaccine, age 7 years and older    Other orders  -     Follow Up In Primary Care - Health Maintenance  -     Follow Up In Primary Care - Health Maintenance; Future    Subjective   HPI  Review of Systems   Constitutional:  Negative for chills, diaphoresis, fatigue and fever.   HENT:  Negative for hearing loss and mouth sores.    Eyes:  Negative for visual disturbance.   Respiratory:  Negative for cough, chest tightness and shortness of breath.    Cardiovascular:  Negative for chest pain, palpitations and leg swelling.   Gastrointestinal:  Negative for abdominal pain, blood in stool, nausea and vomiting.   Endocrine: Negative for polydipsia, polyphagia and polyuria.   Psychiatric/Behavioral:  Negative for agitation and confusion.       Objective   Vitals:    10/14/24 1459   BP: 138/84   Pulse: 60   Temp: 36.5 °C (97.7 °F)   SpO2: 97%      Body mass index is 36.94 kg/m².  Physical Exam  Vitals and nursing note reviewed.   Constitutional:       General: He is not in acute distress.  Neck:      Vascular: No carotid bruit.   Cardiovascular:      Rate and Rhythm: Normal rate and regular rhythm.      Heart sounds: Normal heart sounds.   Pulmonary:      Effort: Pulmonary effort is normal.      Breath sounds: Normal breath sounds.   Abdominal:      General: Bowel sounds are normal. There is no distension.      Palpations: There is no mass.      Tenderness: There is no abdominal tenderness. There is no right CVA tenderness or left CVA tenderness.   Musculoskeletal:      Cervical back: No tenderness.      Right lower leg: No edema.      Left lower leg: No edema.   Lymphadenopathy:       "Cervical: No cervical adenopathy.   Skin:     General: Skin is warm and dry.   Neurological:      Mental Status: He is alert. Mental status is at baseline.   Psychiatric:         Mood and Affect: Mood normal.       Diagnostic Results   Lab Results   Component Value Date    GLUCOSE 124 (H) 07/10/2024    CALCIUM 8.5 (L) 07/10/2024     (L) 07/10/2024    K 3.8 07/10/2024    CO2 25 07/10/2024     07/10/2024    BUN 13 07/10/2024    CREATININE 0.84 07/10/2024     Lab Results   Component Value Date    ALT 11 07/08/2024    AST 16 07/08/2024    ALKPHOS 67 07/08/2024    BILITOT 1.0 07/08/2024     Lab Results   Component Value Date    WBC 10.5 08/27/2024    HGB 13.5 08/27/2024    HCT 42.6 08/27/2024    MCV 91 08/27/2024     08/27/2024     Lab Results   Component Value Date    CHOL 194 03/21/2024    CHOL 177 04/20/2023    CHOL 165 04/01/2022     Lab Results   Component Value Date    HDL 38.9 03/21/2024    HDL 41.9 04/20/2023    HDL 33.0 (A) 04/01/2022     Lab Results   Component Value Date    LDLCALC 128 (H) 03/21/2024    LDLCALC 115 05/15/2020     Lab Results   Component Value Date    TRIG 136 03/21/2024    TRIG 122 04/20/2023    TRIG 93 04/01/2022     No components found for: \"CHOLHDL\"  Lab Results   Component Value Date    HGBA1C 6.8 (H) 07/09/2024     Other labs not included in the list above were reviewed either before or during this encounter.    History    Past Medical History:   Diagnosis Date    Allergic     Anxiety     Arthritis 09/01/2023    Clotting disorder (Multi)     Depression     Difficulty walking 2003    HL (hearing loss) 05/01/2023    Hypertension 2000?    Pulmonary embolism     Urinary tract infection 01/01/2015     Past Surgical History:   Procedure Laterality Date    BACK SURGERY  01/01/2002    CHOLECYSTECTOMY  01/01/2008    HERNIA REPAIR  01/01/2011    JOINT REPLACEMENT  01/01/2010    SPINE SURGERY  01/01/2002    TOENAIL EXCISION  2019    TONSILLECTOMY  01/01/1971    WISDOM TOOTH " EXTRACTION  1998     Family History   Problem Relation Name Age of Onset    Mental illness Mother Chey     Hypertension Mother Chey     Diabetes Mother Chey     Peripheral vascular disease Father Sd     Diabetes Father Sd     Hypertension Father Sd     Cancer Father Sd     No Known Problems Sister      No Known Problems Daughter      No Known Problems Son      Diabetes Other fam hx     Heart disease Other fam hx     Stroke Other fam hx     Hypertension Other fam hx     Cancer Other fam hx      Social History     Socioeconomic History    Marital status:      Spouse name: Not on file    Number of children: Not on file    Years of education: Not on file    Highest education level: Not on file   Occupational History    Not on file   Tobacco Use    Smoking status: Former     Current packs/day: 0.00     Average packs/day: 1 pack/day for 14.7 years (14.7 ttl pk-yrs)     Types: Cigarettes, Cigars     Start date: 1974     Quit date: 1989     Years since quittin.7     Passive exposure: Never    Smokeless tobacco: Former   Substance and Sexual Activity    Alcohol use: Yes     Alcohol/week: 1.0 standard drink of alcohol     Types: 1 Cans of beer per week     Comment: Sometimes 3    Drug use: Never    Sexual activity: Not Currently     Partners: Female   Other Topics Concern    Not on file   Social History Narrative    Not on file     Social Determinants of Health     Financial Resource Strain: Low Risk  (2024)    Overall Financial Resource Strain (CARDIA)     Difficulty of Paying Living Expenses: Not hard at all   Food Insecurity: Not on file   Transportation Needs: No Transportation Needs (2024)    PRAPARE - Transportation     Lack of Transportation (Medical): No     Lack of Transportation (Non-Medical): No   Physical Activity: Not on file   Stress: Not on file   Social Connections: Unknown (2024)    Social Connection and Isolation Panel [NHANES]     Frequency of Communication with  Friends and Family: Not on file     Frequency of Social Gatherings with Friends and Family: Not on file     Attends Scientologist Services: Not on file     Active Member of Clubs or Organizations: Not on file     Attends Club or Organization Meetings: Not on file     Marital Status:    Intimate Partner Violence: Not At Risk (1/5/2024)    Humiliation, Afraid, Rape, and Kick questionnaire     Fear of Current or Ex-Partner: No     Emotionally Abused: No     Physically Abused: No     Sexually Abused: No   Housing Stability: Low Risk  (7/9/2024)    Housing Stability Vital Sign     Unable to Pay for Housing in the Last Year: No     Number of Places Lived in the Last Year: 1     Unstable Housing in the Last Year: No     Allergies   Allergen Reactions    Fentanyl Palpitations and Unknown     rapid heart rate    Duloxetine GI Upset    Venlafaxine GI Upset    Methadone Palpitations     Current Outpatient Medications on File Prior to Visit   Medication Sig Dispense Refill    acetaminophen (Tylenol) 500 mg tablet Take 2 tablets (1,000 mg) by mouth every 8 hours if needed.      atorvastatin (Lipitor) 10 mg tablet Take 1 tablet (10 mg) by mouth once daily. 90 tablet 3    Eliquis 5 mg tablet Take 1 tablet by mouth twice daily with or without food. 60 tablet 3    gabapentin (Neurontin) 600 mg tablet TAKE ONE TABLET BY MOUTH THREE TIMES A  tablet 2    lisinopril 10 mg tablet TAKE ONE TABLET BY MOUTH EVERY DAY FOR 30 DAYS 90 tablet 0    metFORMIN (Glucophage) 500 mg tablet Take 1 tablet (500 mg) by mouth once daily with breakfast. 90 tablet 3    metoprolol tartrate (Lopressor) 25 mg tablet TAKE ONE TABLET BY MOUTH TWICE A DAY WITH FOOD 180 tablet 0    omeprazole (PriLOSEC) 40 mg DR capsule Take 1 capsule (40 mg) by mouth once daily.      sertraline (Zoloft) 100 mg tablet TAKE ONE TABLET BY MOUTH ONCE EVERY DAY 30 tablet 3    sildenafil (Viagra) 100 mg tablet Take 1 tablet (100 mg) by mouth once daily as needed.       albuterol 90 mcg/actuation inhaler Inhale 1-2 puffs every 6 hours if needed for wheezing or shortness of breath. 18 g 1    [DISCONTINUED] diclofenac sodium 3 % gel Apply topically 2 times a day. As needed (Patient not taking: Reported on 9/30/2024) 100 g 1     No current facility-administered medications on file prior to visit.     Immunization History   Administered Date(s) Administered    Flu vaccine (IIV4), preservative free *Check age/dose* 10/07/2020, 03/11/2023, 01/17/2024    Hep B Immune Globulin 02/18/2013, 03/18/2013    Influenza, Unspecified 10/01/2005    Influenza, injectable, quadrivalent 12/07/2015, 10/11/2016, 10/19/2017, 11/26/2018, 10/18/2019    Influenza, seasonal, injectable 10/22/2009, 09/20/2011, 10/17/2012, 11/15/2013, 11/15/2014    Moderna SARS-CoV-2 Vaccination 04/17/2022, 05/15/2022    Pneumococcal conjugate vaccine, 20-valent (PREVNAR 20) 01/17/2024    Pneumococcal polysaccharide vaccine, 23-valent, age 2 years and older (PNEUMOVAX 23) 02/01/2005, 04/22/2006    Td vaccine, age 7 years and older (TDVAX) 09/12/2003, 04/22/2005, 06/14/2005    Tdap vaccine, age 7 year and older (BOOSTRIX, ADACEL) 08/26/2014     Patient's medical history was reviewed and updated either before or during this encounter.       Victoria Aguirre, APRN-CNP

## 2024-10-16 ENCOUNTER — HOSPITAL ENCOUNTER (OUTPATIENT)
Dept: RADIOLOGY | Facility: HOSPITAL | Age: 64
Discharge: HOME | End: 2024-10-16
Payer: COMMERCIAL

## 2024-10-16 DIAGNOSIS — S69.92XA INJURY OF LEFT THUMB, INITIAL ENCOUNTER: ICD-10-CM

## 2024-10-16 PROCEDURE — 73218 MRI UPPER EXTREMITY W/O DYE: CPT | Mod: LEFT SIDE | Performed by: RADIOLOGY

## 2024-10-16 PROCEDURE — 73218 MRI UPPER EXTREMITY W/O DYE: CPT | Mod: LT

## 2024-10-23 DIAGNOSIS — F41.9 ANXIETY: ICD-10-CM

## 2024-10-23 DIAGNOSIS — Z86.711 HISTORY OF PULMONARY EMBOLISM: ICD-10-CM

## 2024-10-23 RX ORDER — APIXABAN 5 MG/1
TABLET, FILM COATED ORAL
Qty: 60 TABLET | Refills: 0 | Status: SHIPPED | OUTPATIENT
Start: 2024-10-23

## 2024-10-23 RX ORDER — SERTRALINE HYDROCHLORIDE 100 MG/1
100 TABLET, FILM COATED ORAL DAILY
Qty: 30 TABLET | Refills: 0 | Status: SHIPPED | OUTPATIENT
Start: 2024-10-23

## 2024-10-24 DIAGNOSIS — M54.50 LOW BACK PAIN WITH RADIATION: ICD-10-CM

## 2024-10-24 RX ORDER — GABAPENTIN 600 MG/1
600 TABLET ORAL 2 TIMES DAILY
Qty: 270 TABLET | Refills: 2 | Status: SHIPPED | OUTPATIENT
Start: 2024-10-24

## 2024-10-31 ENCOUNTER — HOSPITAL ENCOUNTER (OUTPATIENT)
Dept: RADIOLOGY | Facility: HOSPITAL | Age: 64
Discharge: HOME | End: 2024-10-31
Payer: COMMERCIAL

## 2024-10-31 ENCOUNTER — APPOINTMENT (OUTPATIENT)
Dept: RADIOLOGY | Facility: HOSPITAL | Age: 64
End: 2024-10-31
Payer: COMMERCIAL

## 2024-10-31 VITALS
OXYGEN SATURATION: 95 % | RESPIRATION RATE: 18 BRPM | WEIGHT: 280 LBS | HEIGHT: 73 IN | BODY MASS INDEX: 37.11 KG/M2 | HEART RATE: 55 BPM | DIASTOLIC BLOOD PRESSURE: 65 MMHG | SYSTOLIC BLOOD PRESSURE: 115 MMHG

## 2024-10-31 DIAGNOSIS — T84.84XA HIP PAIN ASSOCIATED WITH RECALLED TOTAL HIP ARTHROPLASTY HARDWARE, INITIAL ENCOUNTER (CMS-HCC): ICD-10-CM

## 2024-10-31 DIAGNOSIS — Z96.649 HIP PAIN ASSOCIATED WITH RECALLED TOTAL HIP ARTHROPLASTY HARDWARE, INITIAL ENCOUNTER (CMS-HCC): ICD-10-CM

## 2024-10-31 DIAGNOSIS — I10 PRIMARY HYPERTENSION: ICD-10-CM

## 2024-10-31 LAB
CREAT SERPL-MCNC: 0.96 MG/DL (ref 0.6–1.3)
GFR SERPL CREATININE-BSD FRML MDRD: 89 ML/MIN/1.73M*2

## 2024-10-31 PROCEDURE — 7100000010 HC PHASE TWO TIME - EACH INCREMENTAL 1 MINUTE

## 2024-10-31 PROCEDURE — 2550000001 HC RX 255 CONTRASTS: Performed by: STUDENT IN AN ORGANIZED HEALTH CARE EDUCATION/TRAINING PROGRAM

## 2024-10-31 PROCEDURE — 7100000009 HC PHASE TWO TIME - INITIAL BASE CHARGE

## 2024-10-31 PROCEDURE — 2500000004 HC RX 250 GENERAL PHARMACY W/ HCPCS (ALT 636 FOR OP/ED): Performed by: STUDENT IN AN ORGANIZED HEALTH CARE EDUCATION/TRAINING PROGRAM

## 2024-10-31 PROCEDURE — 99152 MOD SED SAME PHYS/QHP 5/>YRS: CPT | Performed by: STUDENT IN AN ORGANIZED HEALTH CARE EDUCATION/TRAINING PROGRAM

## 2024-10-31 PROCEDURE — 99152 MOD SED SAME PHYS/QHP 5/>YRS: CPT

## 2024-10-31 PROCEDURE — 10009 FNA BX W/CT GDN 1ST LES: CPT

## 2024-10-31 PROCEDURE — 82565 ASSAY OF CREATININE: CPT

## 2024-10-31 RX ORDER — MIDAZOLAM HYDROCHLORIDE 1 MG/ML
INJECTION INTRAMUSCULAR; INTRAVENOUS
Status: COMPLETED | OUTPATIENT
Start: 2024-10-31 | End: 2024-10-31

## 2024-10-31 RX ORDER — METOPROLOL TARTRATE 25 MG/1
25 TABLET, FILM COATED ORAL
Qty: 180 TABLET | Refills: 3 | Status: SHIPPED | OUTPATIENT
Start: 2024-10-31

## 2024-10-31 RX ORDER — MIDAZOLAM HYDROCHLORIDE 1 MG/ML
INJECTION, SOLUTION INTRAMUSCULAR; INTRAVENOUS
Status: DISCONTINUED
Start: 2024-10-31 | End: 2024-11-01 | Stop reason: HOSPADM

## 2024-10-31 RX ORDER — LISINOPRIL 10 MG/1
TABLET ORAL
Qty: 90 TABLET | Refills: 3 | Status: SHIPPED | OUTPATIENT
Start: 2024-10-31

## 2024-11-05 ENCOUNTER — APPOINTMENT (OUTPATIENT)
Dept: RADIOLOGY | Facility: HOSPITAL | Age: 64
End: 2024-11-05
Payer: COMMERCIAL

## 2024-11-21 ENCOUNTER — LAB (OUTPATIENT)
Dept: LAB | Facility: LAB | Age: 64
End: 2024-11-21
Payer: COMMERCIAL

## 2024-11-21 ENCOUNTER — OFFICE VISIT (OUTPATIENT)
Dept: HEMATOLOGY/ONCOLOGY | Facility: HOSPITAL | Age: 64
End: 2024-11-21
Payer: COMMERCIAL

## 2024-11-21 VITALS
TEMPERATURE: 96.3 F | HEART RATE: 62 BPM | OXYGEN SATURATION: 94 % | WEIGHT: 270.73 LBS | HEIGHT: 73 IN | BODY MASS INDEX: 35.88 KG/M2 | DIASTOLIC BLOOD PRESSURE: 55 MMHG | RESPIRATION RATE: 16 BRPM | SYSTOLIC BLOOD PRESSURE: 111 MMHG

## 2024-11-21 DIAGNOSIS — Z86.711 HISTORY OF PULMONARY EMBOLISM: Primary | ICD-10-CM

## 2024-11-21 DIAGNOSIS — Z86.711 HISTORY OF PULMONARY EMBOLISM: ICD-10-CM

## 2024-11-21 DIAGNOSIS — R30.0 DYSURIA: ICD-10-CM

## 2024-11-21 LAB
APPEARANCE UR: CLEAR
BILIRUB UR STRIP.AUTO-MCNC: NEGATIVE MG/DL
COLOR UR: YELLOW
GLUCOSE UR STRIP.AUTO-MCNC: NORMAL MG/DL
KETONES UR STRIP.AUTO-MCNC: NEGATIVE MG/DL
LEUKOCYTE ESTERASE UR QL STRIP.AUTO: NEGATIVE
MUCOUS THREADS #/AREA URNS AUTO: NORMAL /LPF
NITRITE UR QL STRIP.AUTO: NEGATIVE
PH UR STRIP.AUTO: 5.5 [PH]
PROT UR STRIP.AUTO-MCNC: ABNORMAL MG/DL
RBC # UR STRIP.AUTO: ABNORMAL /UL
RBC #/AREA URNS AUTO: NORMAL /HPF
SP GR UR STRIP.AUTO: 1.03
UROBILINOGEN UR STRIP.AUTO-MCNC: NORMAL MG/DL
WBC #/AREA URNS AUTO: NORMAL /HPF

## 2024-11-21 PROCEDURE — 3008F BODY MASS INDEX DOCD: CPT | Performed by: INTERNAL MEDICINE

## 2024-11-21 PROCEDURE — 81001 URINALYSIS AUTO W/SCOPE: CPT

## 2024-11-21 PROCEDURE — 3061F NEG MICROALBUMINURIA REV: CPT | Performed by: INTERNAL MEDICINE

## 2024-11-21 PROCEDURE — 99214 OFFICE O/P EST MOD 30 MIN: CPT | Performed by: INTERNAL MEDICINE

## 2024-11-21 PROCEDURE — 3044F HG A1C LEVEL LT 7.0%: CPT | Performed by: INTERNAL MEDICINE

## 2024-11-21 PROCEDURE — 3049F LDL-C 100-129 MG/DL: CPT | Performed by: INTERNAL MEDICINE

## 2024-11-21 PROCEDURE — 3074F SYST BP LT 130 MM HG: CPT | Performed by: INTERNAL MEDICINE

## 2024-11-21 PROCEDURE — 3078F DIAST BP <80 MM HG: CPT | Performed by: INTERNAL MEDICINE

## 2024-11-21 PROCEDURE — 4010F ACE/ARB THERAPY RXD/TAKEN: CPT | Performed by: INTERNAL MEDICINE

## 2024-11-21 ASSESSMENT — PATIENT HEALTH QUESTIONNAIRE - PHQ9
10. IF YOU CHECKED OFF ANY PROBLEMS, HOW DIFFICULT HAVE THESE PROBLEMS MADE IT FOR YOU TO DO YOUR WORK, TAKE CARE OF THINGS AT HOME, OR GET ALONG WITH OTHER PEOPLE: NOT DIFFICULT AT ALL
2. FEELING DOWN, DEPRESSED OR HOPELESS: SEVERAL DAYS
1. LITTLE INTEREST OR PLEASURE IN DOING THINGS: SEVERAL DAYS
SUM OF ALL RESPONSES TO PHQ9 QUESTIONS 1 AND 2: 2

## 2024-11-21 ASSESSMENT — ENCOUNTER SYMPTOMS
RESPIRATORY NEGATIVE: 1
DYSURIA: 1
HEMATURIA: 0
FREQUENCY: 1
CARDIOVASCULAR NEGATIVE: 1
GASTROINTESTINAL NEGATIVE: 1
CONSTITUTIONAL NEGATIVE: 1

## 2024-11-21 ASSESSMENT — PAIN SCALES - GENERAL: PAINLEVEL_OUTOF10: 6

## 2024-11-21 NOTE — PROGRESS NOTES
"Patient ID: Tate Mullins is a 64 y.o. male.    Subjective:  Returns for follow up for his h/o PE. Denies having new complaints.     No blood in stool. Has EGD/Colonoscopy recently in 2024 @ [facility] that was OK. Denies pica. No worsening of leg swelling.     Has been having dysuria x 3 days. Has had UTI before.     Heme/Onc History:  Stage/Status:  - 2002: Distal LLE DVT and PE after back surgery => 6 month anti-coagulation with Lovenox and coumadin  - Mar 2023: Unprovoked LLL PE => Started on Eliquis. Due to h/o A fib (first episode in 2015 and last one likely in June 2020 - I saw the EKG from June 2020 showing A fib with RVR), continued with 5 mg po bid    Assessment/Plan:  ? h/o PE: Needs lifelong anti-coagulation since he had two unprovoked VTEs. He could take 2.5 mg po bid at prophylactic dose if he did not have history of Atrial Fibrillation. Ihe will see Cardiology in Jan 2025. If they think he does not require anti-coagulation for h/o A fib, he will decrease dose to 2.5 mg PO BID. Otherwise, will continue at 5s. He will call us should he have any questions/requests.     Dysuria: Will check UA today. May need Cipro depending on the results.     Review Of Systems:  Review of Systems   Constitutional: Negative.    HENT:  Negative.     Respiratory: Negative.     Cardiovascular: Negative.    Gastrointestinal: Negative.    Genitourinary:  Positive for dysuria and frequency. Negative for hematuria.        Physical Exam:  /55 (BP Location: Left arm, Patient Position: Sitting, BP Cuff Size: Adult)   Pulse 62   Temp 35.7 °C (96.3 °F) (Temporal)   Resp 16   Ht 1.854 m (6' 1\")   Wt 123 kg (270 lb 11.6 oz)   SpO2 94%   BMI 35.72 kg/m²   BSA: 2.52 meters squared  Performance Status: Symptomatic; fully ambulatory  Physical Exam  HENT:      Head: Normocephalic and atraumatic.   Eyes:      General: No scleral icterus.  Pulmonary:      Effort: Pulmonary effort is normal.   Musculoskeletal:         " General: Normal range of motion.   Skin:     Coloration: Skin is not jaundiced.   Neurological:      General: No focal deficit present.      Mental Status: He is alert and oriented to person, place, and time.         Results:  Diagnostic Results   Lab Results   Component Value Date    WBC 10.5 08/27/2024    HGB 13.5 08/27/2024    HCT 42.6 08/27/2024    MCV 91 08/27/2024     08/27/2024     Lab Results   Component Value Date    CALCIUM 8.5 (L) 07/10/2024     (L) 07/10/2024    K 3.8 07/10/2024    CO2 25 07/10/2024     07/10/2024    BUN 13 07/10/2024    CREATININE 0.84 07/10/2024    ALT 11 07/08/2024    AST 16 07/08/2024       Current Outpatient Medications:     acetaminophen (Tylenol) 500 mg tablet, Take 2 tablets (1,000 mg) by mouth every 8 hours if needed., Disp: , Rfl:     albuterol 90 mcg/actuation inhaler, Inhale 1-2 puffs every 6 hours if needed for wheezing or shortness of breath., Disp: 18 g, Rfl: 1    atorvastatin (Lipitor) 10 mg tablet, Take 1 tablet (10 mg) by mouth once daily., Disp: 90 tablet, Rfl: 3    Eliquis 5 mg tablet, Take 1 tablet by mouth twice daily with or without food., Disp: 60 tablet, Rfl: 0    gabapentin (Neurontin) 600 mg tablet, Take 1 tablet (600 mg) by mouth 2 times a day., Disp: 270 tablet, Rfl: 2    lisinopril 10 mg tablet, TAKE ONE TABLET BY MOUTH EVERY DAY FOR 30 DAYS., Disp: 90 tablet, Rfl: 3    metFORMIN (Glucophage) 500 mg tablet, Take 1 tablet (500 mg) by mouth once daily with breakfast., Disp: 90 tablet, Rfl: 3    metoprolol tartrate (Lopressor) 25 mg tablet, TAKE ONE TABLET BY MOUTH TWICE A DAY WITH FOOD, Disp: 180 tablet, Rfl: 3    omeprazole (PriLOSEC) 40 mg DR capsule, Take 1 capsule (40 mg) by mouth once daily., Disp: , Rfl:     sertraline (Zoloft) 100 mg tablet, TAKE ONE TABLET BY MOUTH ONCE EVERY DAY, Disp: 30 tablet, Rfl: 0    sildenafil (Viagra) 100 mg tablet, Take 1 tablet (100 mg) by mouth once daily as needed., Disp: , Rfl:      Past Surgical  History:   Procedure Laterality Date    BACK SURGERY  01/01/2002    CHOLECYSTECTOMY  01/01/2008    HERNIA REPAIR  01/01/2011    JOINT REPLACEMENT  01/01/2010    SPINE SURGERY  01/01/2002    TOENAIL EXCISION  2019    TONSILLECTOMY  01/01/1971    WISDOM TOOTH EXTRACTION  01/01/1998     Family History   Problem Relation Name Age of Onset    Mental illness Mother Chey     Hypertension Mother Chey     Diabetes Mother Chey     Peripheral vascular disease Father Sd     Diabetes Father Sd     Hypertension Father Sd     Cancer Father Sd     No Known Problems Sister      No Known Problems Daughter      No Known Problems Son      Diabetes Other fam hx     Heart disease Other fam hx     Stroke Other fam hx     Hypertension Other fam hx     Cancer Other fam hx       reports that he quit smoking about 35 years ago. His smoking use included cigarettes and cigars. He started smoking about 50 years ago. He has a 14.7 pack-year smoking history. He has never been exposed to tobacco smoke. He has quit using smokeless tobacco.    Diagnoses and all orders for this visit:  History of pulmonary embolism  -     Clinic Appointment Request  -     Urinalysis with Reflex Culture and Microscopic; Future  Dysuria  -     Urinalysis with Reflex Culture and Microscopic; Future       I spent more than 30 minutes for the patient today, including face-to-face conversation, pre-visit preparation, post-visit orders, and others.   Edward Sims MD

## 2024-11-22 LAB — HOLD SPECIMEN: NORMAL

## 2024-11-25 ENCOUNTER — TELEPHONE (OUTPATIENT)
Dept: HEMATOLOGY/ONCOLOGY | Facility: HOSPITAL | Age: 64
End: 2024-11-25
Payer: COMMERCIAL

## 2024-11-25 NOTE — TELEPHONE ENCOUNTER
Please let him know that he does not have a UTI. Does not need other antibiotics. Should see Urology. Per Dr. DIONICIO Sims staff message.      Urology [phone]        Contacted patient, informed him he does not have a UTI and does not need antibiotics. Provided patient with urology centralized scheduling phone number. Request he contact the office with any further questions.

## 2024-12-02 ENCOUNTER — APPOINTMENT (OUTPATIENT)
Dept: PRIMARY CARE | Facility: CLINIC | Age: 64
End: 2024-12-02
Payer: COMMERCIAL

## 2024-12-03 ENCOUNTER — APPOINTMENT (OUTPATIENT)
Dept: PULMONOLOGY | Facility: CLINIC | Age: 64
End: 2024-12-03
Payer: COMMERCIAL

## 2024-12-12 DIAGNOSIS — F41.9 ANXIETY: ICD-10-CM

## 2024-12-13 RX ORDER — SERTRALINE HYDROCHLORIDE 100 MG/1
100 TABLET, FILM COATED ORAL DAILY
Qty: 90 TABLET | Refills: 3 | Status: SHIPPED | OUTPATIENT
Start: 2024-12-13

## 2025-01-06 ENCOUNTER — APPOINTMENT (OUTPATIENT)
Dept: CARDIOLOGY | Facility: CLINIC | Age: 65
End: 2025-01-06
Payer: COMMERCIAL

## 2025-01-06 VITALS
HEIGHT: 73 IN | WEIGHT: 270 LBS | HEART RATE: 60 BPM | BODY MASS INDEX: 35.78 KG/M2 | DIASTOLIC BLOOD PRESSURE: 75 MMHG | SYSTOLIC BLOOD PRESSURE: 149 MMHG | OXYGEN SATURATION: 92 %

## 2025-01-06 DIAGNOSIS — E78.2 MIXED HYPERLIPIDEMIA: ICD-10-CM

## 2025-01-06 DIAGNOSIS — E11.9 TYPE 2 DIABETES MELLITUS WITHOUT COMPLICATION, WITHOUT LONG-TERM CURRENT USE OF INSULIN (MULTI): ICD-10-CM

## 2025-01-06 DIAGNOSIS — I25.10 ASCVD (ARTERIOSCLEROTIC CARDIOVASCULAR DISEASE): ICD-10-CM

## 2025-01-06 DIAGNOSIS — G47.33 OBSTRUCTIVE SLEEP APNEA SYNDROME: ICD-10-CM

## 2025-01-06 DIAGNOSIS — I48.0 PAROXYSMAL ATRIAL FIBRILLATION (MULTI): Primary | ICD-10-CM

## 2025-01-06 DIAGNOSIS — I10 PRIMARY HYPERTENSION: ICD-10-CM

## 2025-01-06 DIAGNOSIS — Z86.711 HISTORY OF PULMONARY EMBOLISM: ICD-10-CM

## 2025-01-06 PROCEDURE — 99204 OFFICE O/P NEW MOD 45 MIN: CPT | Performed by: NURSE PRACTITIONER

## 2025-01-06 PROCEDURE — 3077F SYST BP >= 140 MM HG: CPT | Performed by: NURSE PRACTITIONER

## 2025-01-06 PROCEDURE — 3078F DIAST BP <80 MM HG: CPT | Performed by: NURSE PRACTITIONER

## 2025-01-06 PROCEDURE — 3008F BODY MASS INDEX DOCD: CPT | Performed by: NURSE PRACTITIONER

## 2025-01-06 PROCEDURE — 4010F ACE/ARB THERAPY RXD/TAKEN: CPT | Performed by: NURSE PRACTITIONER

## 2025-01-06 PROCEDURE — 1036F TOBACCO NON-USER: CPT | Performed by: NURSE PRACTITIONER

## 2025-01-06 NOTE — LETTER
January 6, 2025     Patient: Rome Mullins   YOB: 1960   Date of Visit: 1/6/2025       To Whom It May Concern:    Rome Mullins was seen in my clinic on 1/6/2025 at 1:00 pm. Please excuse Rome for his absence from work on this day to make the appointment.    If you have any questions or concerns, please don't hesitate to call.         Sincerely,         KURT Lee-CNP        CC: No Recipients

## 2025-01-06 NOTE — PROGRESS NOTES
Primary Care Physician: Victoria Aguirre, KURT-CNP      Date of Visit: 01/06/2025  1:00 PM EST  Location of visit:  [facility]   Type of Visit: New Patient        Chief Complaint   Patient presents with    New Patient Visit     Here  for a follow up for A-Fib DX in 2015        HPI / Summary:   Rome Mullins is a 64 y.o. male who presents to establish cardiac care   Past medical history significant for PAF,  HTN,  HLD,  ASCVD on CT Chest,  T2DM not requiring insulin,  DVT + PE s/p IVC filter (2002) both postoperative and unprovoked on long term OAC with Eliquis,  obesity BMI 35,   Chronic venous insufficiency,   VICENTE on CPAP+ HS O2,        He has PAF as well as hx unprovoked and postoperative  VTE/ PE, on long term OAC with Eliquis for ~1 year   He believes his first episode of PAF was in 2015 with at least one other occasion.  At that time,  OAC was deferred due to low risk   Describes occasional palpitations which are not sustained or bothersome nor associated with any other symptoms               [id number] Score    Age <65: 0   Sex Male: 0   CHF History No: 0   HTN Yes: 1   Stroke/TIA/Thromboembolism Yes: 2   Vascular Dz: CAD/PAD/Aortic Plaque Yes: 1   DM Yes: 1   Total Score 5          12 system review is negative except as noted above  Accompanied by his wife Danii who contributed to the history     Medical History:   Past Medical History:   Diagnosis Date    Allergic     Anxiety     Arthritis 09/01/2023    Clotting disorder (Multi)     Depression     Difficulty walking 2003    HL (hearing loss) 05/01/2023    Hypertension 2000?    Pulmonary embolism     Urinary tract infection 01/01/2015       Surgical History:   Past Surgical History:   Procedure Laterality Date    BACK SURGERY  01/01/2002    CHOLECYSTECTOMY  01/01/2008    HERNIA REPAIR  01/01/2011    JOINT REPLACEMENT  01/01/2010    SPINE SURGERY  01/01/2002    TOENAIL EXCISION  2019    TONSILLECTOMY  01/01/1971    WISDOM TOOTH EXTRACTION  01/01/1998        Family History:   Family History   Problem Relation Name Age of Onset    Mental illness Mother Chey     Hypertension Mother Chey     Diabetes Mother Chey     Peripheral vascular disease Father Sd     Diabetes Father Sd     Hypertension Father Sd     Cancer Father Sd     No Known Problems Sister      No Known Problems Daughter      No Known Problems Son      Diabetes Other fam hx     Heart disease Other fam hx     Stroke Other fam hx     Hypertension Other fam hx     Cancer Other fam hx        Social History:   Tobacco Use: Medium Risk (1/6/2025)    Patient History     Smoking Tobacco Use: Former     Smokeless Tobacco Use: Former     Passive Exposure: Never             MEDICATIONS:   Current Outpatient Medications   Medication Instructions    acetaminophen (TYLENOL) 1,000 mg, Every 8 hours PRN    albuterol 90 mcg/actuation inhaler 1-2 puffs, inhalation, Every 6 hours PRN    atorvastatin (LIPITOR) 10 mg, oral, Daily    Eliquis 5 mg tablet Take 1 tablet by mouth twice daily with or without food.    gabapentin (NEURONTIN) 600 mg, oral, 2 times daily    lisinopril 10 mg tablet TAKE ONE TABLET BY MOUTH EVERY DAY FOR 30 DAYS.    metFORMIN (GLUCOPHAGE) 500 mg, oral, Daily with breakfast    metoprolol tartrate (LOPRESSOR) 25 mg, oral, Take with food.    omeprazole (PRILOSEC) 40 mg, Daily    sertraline (ZOLOFT) 100 mg, oral, Daily    sildenafil (VIAGRA) 100 mg, Daily PRN         IMAGING REPORTS INDEPENDENTLY REVIEWED:     Echocardiogram 3/10/2023  CONCLUSIONS:  1. Left ventricular systolic function is normal with a 55-60% estimated  ejection fraction.      LABS:  CBC:   Lab Results   Component Value Date    WBC 10.5 08/27/2024    RBC 4.70 08/27/2024    HGB 13.5 08/27/2024    HCT 42.6 08/27/2024    MCV 91 08/27/2024    MCH 28.7 08/27/2024    MCHC 31.7 (L) 08/27/2024    RDW 13.7 08/27/2024     08/27/2024    MPV 8.8 03/11/2023     CBC with Differential:    Lab Results   Component Value Date    WBC 10.5  "08/27/2024    RBC 4.70 08/27/2024    HGB 13.5 08/27/2024    HCT 42.6 08/27/2024     08/27/2024    MCV 91 08/27/2024    MCH 28.7 08/27/2024    MCHC 31.7 (L) 08/27/2024    RDW 13.7 08/27/2024    NRBC 0.0 08/27/2024    LYMPHOPCT 30.7 08/27/2024    MONOPCT 10.1 08/27/2024    MYELOPCT 1.00 (H) 12/19/2020    EOSPCT 3.9 08/27/2024    BASOPCT 0.8 08/27/2024    MONOSABS 1.06 (H) 08/27/2024    LYMPHSABS 3.22 08/27/2024    EOSABS 0.41 08/27/2024    BASOSABS 0.08 08/27/2024     CMP:    Lab Results   Component Value Date     (L) 07/10/2024    K 3.8 07/10/2024     07/10/2024    CO2 25 07/10/2024    BUN 13 07/10/2024    CREATININE 0.84 07/10/2024    GLUCOSE 124 (H) 07/10/2024    PROT 7.3 07/08/2024    CALCIUM 8.5 (L) 07/10/2024    BILITOT 1.0 07/08/2024    ALKPHOS 67 07/08/2024    AST 16 07/08/2024    ALT 11 07/08/2024     BMP:    Lab Results   Component Value Date     (L) 07/10/2024    K 3.8 07/10/2024     07/10/2024    CO2 25 07/10/2024    BUN 13 07/10/2024    CREATININE 0.84 07/10/2024    CALCIUM 8.5 (L) 07/10/2024    GLUCOSE 124 (H) 07/10/2024     Magnesium:  Lab Results   Component Value Date    MG 2.14 07/10/2024     Troponin:    Lab Results   Component Value Date    TROPHS 5 07/08/2024    TROPHS 5 07/08/2024    TROPHS 5 05/28/2024     BNP:   Lab Results   Component Value Date    BNP 57 07/08/2024       Lipid Panel:  Lab Results   Component Value Date    HDL 38.9 03/21/2024    CHHDL 5.0 03/21/2024    VLDL 27 03/21/2024    TRIG 136 03/21/2024    NHDL 155 (H) 03/21/2024        Lab work and imaging results independently reviewed by me         Visit Vitals  /75   Pulse 60   Ht 1.854 m (6' 1\")   Wt 122 kg (270 lb)   SpO2 92%   BMI 35.62 kg/m²   Smoking Status Former   BSA 2.51 m²          ECG dated 1/06/2025 independently reviewed   SB 58       Constitutional:       Appearance: Healthy appearance. Not in distress. Obese.   Eyes:      Conjunctiva/sclera: Conjunctivae normal.   Neck:      " Vascular: JVD normal.   Pulmonary:      Breath sounds: Normal breath sounds.   Cardiovascular:      Normal rate. Regular rhythm. Normal S1. Normal S2.       Murmurs: There is no murmur.      No gallop.  No click. No rub.   Pulses:     Intact distal pulses.   Edema:     Peripheral edema absent.   Abdominal:      General: Bowel sounds are normal.      Palpations: Abdomen is soft.   Musculoskeletal:      Cervical back: Neck supple. Skin:     General: Skin is warm and dry.   Neurological:      Mental Status: Alert and oriented to person, place and time.               Problem List Items Addressed This Visit             ICD-10-CM    History of pulmonary embolism Z86.711    Hypertension I10    Mixed hyperlipidemia E78.2    Obstructive sleep apnea syndrome G47.33    Paroxysmal atrial fibrillation (Multi) - Primary I48.0    Relevant Orders    ECG 12 Lead    Type 2 diabetes mellitus without complication, without long-term current use of insulin (Multi) E11.9    ASCVD (arteriosclerotic cardiovascular disease) I25.10         The question is Re: dosing of Eliquis for chronic thromboembolism.  He also has a hx of PAF and nirmala Score 5,  placing him at high risk for stroke which indicates continuing therapeutic dose of  OAC   He is currently not having symptomatic PAF but will call if he develops and bothersome symptoms.     01/06/25 at 3:43 PM - PEE Lee        Followup Appts:  Future Appointments   Date Time Provider Department Center   1/28/2025  1:00 PM Chelle Melendez DPM [id number] [facility]   1/29/2025  8:00 AM Wu Pantoja MD [id number] [facility]   2/3/2025  9:00 AM KURT Henderson-CNP [id number] [facility]   2/5/2025  4:00 PM Ginny Thomas PA-C [id number] [facility]   6/2/2025  1:30 PM Janna Epstein MD [id number] [facility]   10/1/2025  4:00 PM Drake Rivas MD [id number] [facility]

## 2025-01-07 ENCOUNTER — APPOINTMENT (OUTPATIENT)
Dept: PRIMARY CARE | Facility: CLINIC | Age: 65
End: 2025-01-07
Payer: COMMERCIAL

## 2025-01-07 ENCOUNTER — HOSPITAL ENCOUNTER (OUTPATIENT)
Dept: CARDIOLOGY | Facility: HOSPITAL | Age: 65
Discharge: HOME | End: 2025-01-07
Payer: COMMERCIAL

## 2025-01-07 DIAGNOSIS — I48.0 PAROXYSMAL ATRIAL FIBRILLATION (MULTI): ICD-10-CM

## 2025-01-07 LAB
ATRIAL RATE: 58 BPM
P AXIS: 54 DEGREES
P OFFSET: 208 MS
P ONSET: 141 MS
PR INTERVAL: 162 MS
Q ONSET: 222 MS
QRS COUNT: 9 BEATS
QRS DURATION: 94 MS
QT INTERVAL: 424 MS
QTC CALCULATION(BAZETT): 416 MS
QTC FREDERICIA: 419 MS
R AXIS: -19 DEGREES
T AXIS: 34 DEGREES
T OFFSET: 434 MS
VENTRICULAR RATE: 58 BPM

## 2025-01-07 PROCEDURE — 93005 ELECTROCARDIOGRAM TRACING: CPT

## 2025-01-26 NOTE — PROGRESS NOTES
"Subjective   Patient ID: Rome Mullins \"Yarely" is a 64 y.o. male.     HPI  64 y.o. male presents with an elevated PSA level at 2.85.     The patient was seen by Dr. Ferguson for monitoring his mild urinary symptoms. He initially had a UTI that elevated his PSA level. He has been monitoring his PSA closely since.     The patient had a mild amount of blood in his urine. He had a cystoscopy preformed in 12/20/2023.      The patient has a family history of prostate cancer in his father. His father had radiation for the cancer.     He was a smoker, has not smoked since 1980.    He has had 6 back surgeries. He has issues with ED due to the surgeries. He states that his erections are flaccid.The patient was prescribed Viagra 100 mg in the past. He states that it was effective.     The patient has very minimal urinary symptoms. He was told he is diabetic. He stated that he awakens for 4-6 episodes of nocturia per night.       Component  Ref Range & Units 2 mo ago   PSA Screening  <2.60 ng/mL 2.85 High      CT ABDOMEN PELVIS W IV CONTRAST; 7/15/2024   REPRODUCTIVE ORGANS: Evaluation of the prostate is limited due to  streak artifact from the bilateral hip prosthesis.    Lab Results   Component Value Date    PSA 2.48 03/21/2024    PSA 2.8 05/15/2020           Review of Systems  A complete review of systems was performed. All systems are noted to be negative unless indicated in the history of present illness, impression, active problem list, or past histories.     Objective   Physical Exam  General: Well developed, well nourished, alert and cooperative, appears in no acute distress  Eyes: Non-injected conjunctiva, sclera clear, no proptosis  Cardiac: Extremities are warm and well perfused. No edema, cyanosis or pallor.   Lungs: Breathing is easy, non-labored. Speaking in clear and complete sentences. Normal diaphragmatic movement.  Abdomen: soft, non-tender  MSK: Ambulatory with steady gait, unassisted  Neuro: alert and " oriented to person, place and time  Psych: Demonstrates good judgement and reason, without hallucinations, abnormal affect or abnormal behaviors.  Skin: no obvious lesions, no rashes.  : phallus circumcised, normal in size, no plaques or lesions. Testicles: left smaller than right, normal texture, no masses  GOVIND: prostate normal, smooth surface, no nodules    Assessment/Plan   Diagnoses and all orders for this visit:  High prostate specific antigen (PSA)  -     Prostate Specific Antigen; Future  Microhematuria  Other post-procedural erectile dysfunction  Benign prostatic hyperplasia with lower urinary tract symptoms, symptom details unspecified  -     tadalafil (Cialis) 5 mg tablet; Take 1 tablet (5 mg) by mouth once daily.  Erectile dysfunction, unspecified erectile dysfunction type      64 y.o. male presents with an elevated PSA level at 2.85. The patient was seen by Dr. Ferguson for monitoring his symptoms. He initially had a UTI that elevated his PSA level. He has been monitoring his PSA closely since.     I personally reviewed the medical records of the patient including the lab values, the note of the referring physician and I reviewed the report and visualized the images performed focusing on the PSA. The patient's PSA has been fluxuating between 2.8 and 2.4. T    I reviewed with the patient the value and significance of a rising PSA, and the role in screening and early detection of prostate cancer. I explained that PSA is prostate specific, yet not prostate cancer specific, and typical etiology for the rise of PSA include UTI, prostate enlargement, prostatic inflammation such as a bacterial prostatitis, urinary retention, and prostate cancer. To narrow our differential diagnosis, we rule out urinary infection and retention, and perform a prostate MRI which will give us more information about the size of the prostate, possible inflammation, and suspicious lesions which will serve as targets for  MRI/Ultrasound guided fusion targeted biopsy of the prostate.     Positive MRI will necessitate a biopsy of the indicated lesions, whereas a negative MRI will be interpreted in the context of the clinical suspicion, which includes the PSA velocity (speed of rise of PSA), PSA density, age of the patient, and positive family history for prostate cancer or even breast cancer.     I explained to him that prostate cancer is the most common malignancy in men, however it is not the most common cancer killer for several reasons. These are related to the fact that prostate cancer is mostly not aggressive, slow in it growth, progression, and recurrence. In a lot of men, prostate cancer is not diagnosed and still does not result in cancer-related death, and as such active surveillance has become an accepted management option in low-grade low-stage low-volume prostate cancer. A proportion of men with prostate cancer still require treatment in order to prevent progression and metastasis, and some men might require a multidisciplinary management including different combinations of surgery, radiation, and systemic therapy. In order to better diagnose and decide on treatment options in prostate cancer, proper diagnosis should be performed and shared decision making between the physician and the patient is key at that point.     I explained to the patient that erectile dysfunction can be of organic or nonorganic etiologies. Organic etiologies includes vascular or neuropathic related to vasculopathy, diabetes, metabolic syndrome, smoking, anatomical such as penile plaques that can result in erectile dysfunction and penile curvature. Nonorganic etiologies include psychogenic reasons related to desire, proper stimulation, and anxiety such as performance anxiety among other reasons.     The available treatments include oral treatment with PDE 5 inhibitors given either as a once daily low-dose or on-demand high-dose, with the possibility  to combine both if tolerated. Intra-urethral suppositories are other options but are rarely used. Intracavernosal injections of prostaglandins or mixture of pharmacological agents to achieve erection on demand are usually relied on if PDE 5 inhibitors are not effective. The last resort will be penile prosthesis/implant, of which several models include.     I explained to the patient that I manage the first line of treatment with PDE 5 inhibitors, however I would refer him for a men's health nurse or urologist who specializes in this condition for discussion of more advanced treatment options whenever needed.     I went over the side effects of PDE 5 inhibitors which include headache, nasal congestion, facial flushing, GI discomfort and low back pain, in addition to the more concerning yet rare priapism which consists of more than 4 hours of erection that could lead to penile pain, and would be a medical emergency requiring presentation to the emergency department.     The patient understands the management plan, and asked questions that reflected his engagement in the discussion.     The PSA is not significantly elevated and has been fluctuating in that range for several years. As a result we will defer the MRI at this time. As a result of his ED symptoms, the patient will start Cialis 5 mg daily.     Plan:  Repeat PSA in 05/2025  Start Cialis 5 mg daily   FUV in 05/2025    Scribe Attestation   By signing my name below, I, Kamilah Mancia, Scribe attestation that this documentation has been prepared under the direction and in the presence of Wu Pantoja MD.

## 2025-01-28 ENCOUNTER — APPOINTMENT (OUTPATIENT)
Dept: PODIATRY | Facility: CLINIC | Age: 65
End: 2025-01-28
Payer: COMMERCIAL

## 2025-01-28 DIAGNOSIS — E11.9 ENCOUNTER FOR DIABETIC FOOT EXAM (MULTI): ICD-10-CM

## 2025-01-28 DIAGNOSIS — B35.1 ONYCHOMYCOSIS: ICD-10-CM

## 2025-01-28 DIAGNOSIS — I73.9 PVD (PERIPHERAL VASCULAR DISEASE) (CMS-HCC): ICD-10-CM

## 2025-01-28 DIAGNOSIS — M79.671 PAIN IN BOTH FEET: Primary | ICD-10-CM

## 2025-01-28 DIAGNOSIS — M79.672 PAIN IN BOTH FEET: Primary | ICD-10-CM

## 2025-01-28 PROCEDURE — 99212 OFFICE O/P EST SF 10 MIN: CPT | Performed by: PODIATRIST

## 2025-01-28 NOTE — PROGRESS NOTES
History of Present Illness:   Patient states they are here for Dm exam  Denies NTB to feet  Is on blood thinner  History of blood clot in legs and PE  Has pain to right 2nd toe  Most recent A1C is 6.6 - Nov 2024    Past Medical History  Past Medical History:   Diagnosis Date    Allergic     Anxiety     Arthritis 09/01/2023    Clotting disorder (CMS/HCC)     Depression     Difficulty walking 2003    HL (hearing loss) 05/01/2023    Hypertension 2000?    Urinary tract infection 01/01/2015       Medications and Allergies have been reviewed.    Review Of Systems:  GENERAL: No weight loss, malaise or fevers.  HEENT: Negative for frequent or significant headaches,   RESPIRATORY: Negative for cough, wheezing or shortness of breath.  CARDIOVASCULAR: Negative for chest pain, leg swelling or palpitations.    Examination of Both Lower Extremities:   Objective:   Vasc: DP and PT pulses are palpable bilateral.  CFT is less than 3 seconds bilateral.  Skin temperature is warm to cool proximal to distal bilateral.  No hair growth noted    Neuro: Vibratory, light touch and proprioception are intact bilateral.      Derm: Nails 1-5 bilateral are intact , 2nd and 3rd nail thick.  Skin is supple with normal texture and turgor noted.  Webspaces are clean, dry and intact bilateral.  There are no hyperkeratoses, ulcerations, verruca or other lesions noted.      Ortho: Muscle strength is 5/5 for all pedal groups tested.     1. Pain in both feet        2. PVD (peripheral vascular disease) (CMS/[facility])        3. Onychomycosis          1. Pain in both feet  Diabetic Shoe For Density      2. PVD (peripheral vascular disease) (CMS-[facility])  Diabetic Shoe For Density      3. Encounter for diabetic foot exam (Multi)  Diabetic Shoe For Density        Patient educated on proper diabetic foot care.  Nails 1-5 b/l were debrided in thickness and length with nail cutting forceps.  Gave rx for custom inserts. List of vendors  Patient to follow up in 6 mos or  sooner if any problems arise.   Patient was in agreement to this plan. All questions answered.      Chelle Melendez DPM  [phone]  Option 2  Fax: [phone]

## 2025-01-29 ENCOUNTER — APPOINTMENT (OUTPATIENT)
Dept: UROLOGY | Facility: CLINIC | Age: 65
End: 2025-01-29
Payer: COMMERCIAL

## 2025-01-29 DIAGNOSIS — N52.9 ERECTILE DYSFUNCTION, UNSPECIFIED ERECTILE DYSFUNCTION TYPE: ICD-10-CM

## 2025-01-29 DIAGNOSIS — N40.1 BENIGN PROSTATIC HYPERPLASIA WITH LOWER URINARY TRACT SYMPTOMS, SYMPTOM DETAILS UNSPECIFIED: ICD-10-CM

## 2025-01-29 DIAGNOSIS — R97.20 HIGH PROSTATE SPECIFIC ANTIGEN (PSA): Primary | ICD-10-CM

## 2025-01-29 DIAGNOSIS — R31.29 MICROHEMATURIA: ICD-10-CM

## 2025-01-29 DIAGNOSIS — N52.39 OTHER POST-PROCEDURAL ERECTILE DYSFUNCTION: ICD-10-CM

## 2025-01-29 PROCEDURE — 4010F ACE/ARB THERAPY RXD/TAKEN: CPT | Performed by: STUDENT IN AN ORGANIZED HEALTH CARE EDUCATION/TRAINING PROGRAM

## 2025-01-29 PROCEDURE — G2211 COMPLEX E/M VISIT ADD ON: HCPCS | Performed by: STUDENT IN AN ORGANIZED HEALTH CARE EDUCATION/TRAINING PROGRAM

## 2025-01-29 PROCEDURE — 99204 OFFICE O/P NEW MOD 45 MIN: CPT | Performed by: STUDENT IN AN ORGANIZED HEALTH CARE EDUCATION/TRAINING PROGRAM

## 2025-01-29 RX ORDER — TADALAFIL 5 MG/1
5 TABLET ORAL DAILY
Qty: 30 TABLET | Refills: 11 | Status: SHIPPED | OUTPATIENT
Start: 2025-01-29 | End: 2026-01-29

## 2025-02-03 ENCOUNTER — APPOINTMENT (OUTPATIENT)
Dept: PRIMARY CARE | Facility: CLINIC | Age: 65
End: 2025-02-03
Payer: COMMERCIAL

## 2025-02-05 ENCOUNTER — APPOINTMENT (OUTPATIENT)
Dept: PRIMARY CARE | Facility: CLINIC | Age: 65
End: 2025-02-05
Payer: COMMERCIAL

## 2025-02-05 VITALS
WEIGHT: 277.4 LBS | DIASTOLIC BLOOD PRESSURE: 64 MMHG | TEMPERATURE: 97.3 F | HEART RATE: 68 BPM | SYSTOLIC BLOOD PRESSURE: 112 MMHG | OXYGEN SATURATION: 92 % | HEIGHT: 73 IN | BODY MASS INDEX: 36.77 KG/M2

## 2025-02-05 DIAGNOSIS — F41.9 ANXIETY: ICD-10-CM

## 2025-02-05 DIAGNOSIS — E11.9 TYPE 2 DIABETES MELLITUS WITHOUT COMPLICATION, WITHOUT LONG-TERM CURRENT USE OF INSULIN (MULTI): Primary | ICD-10-CM

## 2025-02-05 PROCEDURE — 3074F SYST BP LT 130 MM HG: CPT | Performed by: PHYSICIAN ASSISTANT

## 2025-02-05 PROCEDURE — 1036F TOBACCO NON-USER: CPT | Performed by: PHYSICIAN ASSISTANT

## 2025-02-05 PROCEDURE — 3078F DIAST BP <80 MM HG: CPT | Performed by: PHYSICIAN ASSISTANT

## 2025-02-05 PROCEDURE — 99204 OFFICE O/P NEW MOD 45 MIN: CPT | Performed by: PHYSICIAN ASSISTANT

## 2025-02-05 PROCEDURE — 4010F ACE/ARB THERAPY RXD/TAKEN: CPT | Performed by: PHYSICIAN ASSISTANT

## 2025-02-05 PROCEDURE — 3008F BODY MASS INDEX DOCD: CPT | Performed by: PHYSICIAN ASSISTANT

## 2025-02-05 RX ORDER — METFORMIN HYDROCHLORIDE 500 MG/1
500 TABLET ORAL 2 TIMES DAILY
Qty: 180 TABLET | Refills: 3 | Status: SHIPPED | OUTPATIENT
Start: 2025-02-05

## 2025-02-05 RX ORDER — HYDROXYZINE HYDROCHLORIDE 25 MG/1
25 TABLET, FILM COATED ORAL NIGHTLY
Qty: 90 TABLET | Refills: 0 | Status: SHIPPED | OUTPATIENT
Start: 2025-02-05

## 2025-02-05 NOTE — PROGRESS NOTES
Subjective     HPI   Rome Mullins is a 64 y.o. year old male patient with presenting to clinic with concern for   Chief Complaint   Patient presents with    New Patient Visit       Rome presents to clinic to establish care as a new patient. Formerly under the care of Victoria Aguirre CNP.    HTN  -lisinopril 10  -metoprolol tartrate 25mg bid (and PAF)  BP Readings from Last 5 Encounters:   02/05/25 112/64   01/06/25 149/75   11/21/24 111/55   10/31/24 115/65   10/14/24 138/84     HLD  -atorvastatin 10    Paroxysmal Afib  -metoprolol tartrate 25mg bid  -eliquis    ASCVD  The 10-year ASCVD risk score (Renan DK, et al., 2019) is: 19.8%    Values used to calculate the score:      Age: 64 years      Sex: Male      Is Non- : No      Diabetic: Yes      Tobacco smoker: No      Systolic Blood Pressure: 112 mmHg      Is BP treated: Yes      HDL Cholesterol: 38.9 mg/dL      Total Cholesterol: 194 mg/dL    DM2  -metformin 500 bid  Lab Results   Component Value Date    HGBA1C 6.6 (H) 11/19/2024   Optho 10/14/24  -ASA on eliquis  -lisinopril  -atorvastatin  Podiatry- Dr Melendez    GERD  -omeprazole 40    Anxiety  -zoloft 100    PE 2002  DVT LLE and PE following back surgery  IVC filter     VICENTE   Compliant with CPAP d/t claustrophoia with 2lpm       Home O2 at bedtime only  (since Covid illness)     Pt concerned for limb movements and jerking while asleep and falling asleep. As of 7/12/2022, no RLS noted during sleep study.     High PSA (2.8) & ED  Dr [facility] (elevated PSA 2/2 UTI)  -Cialis 5    Severe COVID 2021 with 1 month hospitalization (apparently I was the one that sent him to ER for admission which is why he came here)  Dr Mckeon  -albuterol PRN  PNA 7/2024    Chronic low back pain  Postlaminectomy syndrome, DDD lumbar, Fibromyalgia  -gabapentin 600 bid    Arthritis  [facility] arthritis  Dr Silviano Fields hip TKR   Dr Miranda-Leo    Former smoker  1 ppd 15 yr  [id number]    Patient Active Problem List   Diagnosis    Agoraphobia    Anxiety    Degeneration of lumbar intervertebral disc    Erectile dysfunction    Fibromyalgia    Gastroesophageal reflux disease    History of pulmonary embolism    Hypertension    Mixed hyperlipidemia    Low back pain with radiation    Obstructive sleep apnea syndrome    Osteoarthritis    Paroxysmal atrial fibrillation (Multi)    Postlaminectomy syndrome of lumbosacral region    Sleep related hypoxia    Type 2 diabetes mellitus without complication, without long-term current use of insulin (Multi)    ASCVD (arteriosclerotic cardiovascular disease)       Past Medical History:   Diagnosis Date    Allergic     Anxiety     Arthritis 09/01/2023    Clotting disorder (Multi)     Depression     Difficulty walking 2003    HL (hearing loss) 05/01/2023    Hypertension 2000?    Pulmonary embolism     Urinary tract infection 01/01/2015      Past Surgical History:   Procedure Laterality Date    BACK SURGERY  01/01/2002    CHOLECYSTECTOMY  01/01/2008    HERNIA REPAIR  01/01/2011    JOINT REPLACEMENT  01/01/2010    SPINE SURGERY  01/01/2002    TOENAIL EXCISION  2019    TONSILLECTOMY  01/01/1971    WISDOM TOOTH EXTRACTION  01/01/1998      Family History   Problem Relation Name Age of Onset    Mental illness Mother Chey     Hypertension Mother Chey     Diabetes Mother Chey     Peripheral vascular disease Father Sd     Diabetes Father Sd     Hypertension Father Sd     Cancer Father Sd     No Known Problems Sister      No Known Problems Daughter      No Known Problems Son      Diabetes Other fam hx     Heart disease Other fam hx     Stroke Other fam hx     Hypertension Other fam hx     Cancer Other fam hx       Social History     Tobacco Use    Smoking status: Former     Current packs/day: 0.00     Average packs/day: 1 pack/day for 14.7 years (14.7 ttl pk-yrs)     Types: Cigarettes, Cigars     Start date: 6/1/1974     Quit date: 2/1/1989     Years since  quittin.0     Passive exposure: Never    Smokeless tobacco: Former   Substance Use Topics    Alcohol use: Yes     Alcohol/week: 1.0 standard drink of alcohol     Types: 1 Cans of beer per week     Comment: Sometimes 3        Current Outpatient Medications:     acetaminophen (Tylenol) 500 mg tablet, Take 2 tablets (1,000 mg) by mouth every 8 hours if needed., Disp: , Rfl:     albuterol 90 mcg/actuation inhaler, Inhale 1-2 puffs every 6 hours if needed for wheezing or shortness of breath., Disp: 18 g, Rfl: 1    atorvastatin (Lipitor) 10 mg tablet, Take 1 tablet (10 mg) by mouth once daily., Disp: 90 tablet, Rfl: 3    Eliquis 5 mg tablet, Take 1 tablet by mouth twice daily with or without food., Disp: 60 tablet, Rfl: 0    gabapentin (Neurontin) 600 mg tablet, Take 1 tablet (600 mg) by mouth 2 times a day., Disp: 270 tablet, Rfl: 2    lisinopril 10 mg tablet, TAKE ONE TABLET BY MOUTH EVERY DAY FOR 30 DAYS., Disp: 90 tablet, Rfl: 3    metoprolol tartrate (Lopressor) 25 mg tablet, TAKE ONE TABLET BY MOUTH TWICE A DAY WITH FOOD, Disp: 180 tablet, Rfl: 3    omeprazole (PriLOSEC) 40 mg DR capsule, Take 1 capsule (40 mg) by mouth once daily., Disp: , Rfl:     sertraline (Zoloft) 100 mg tablet, TAKE ONE TABLET BY MOUTH ONCE EVERY DAY, Disp: 90 tablet, Rfl: 3    tadalafil (Cialis) 5 mg tablet, Take 1 tablet (5 mg) by mouth once daily., Disp: 30 tablet, Rfl: 11    hydrOXYzine HCL (Atarax) 25 mg tablet, Take 1 tablet (25 mg) by mouth once daily at bedtime., Disp: 90 tablet, Rfl: 0    metFORMIN (Glucophage) 500 mg tablet, Take 1 tablet (500 mg) by mouth 2 times a day., Disp: 180 tablet, Rfl: 3     Review of Systems  Constitutional: Denies fever  HEENT: Denies ST, earache  CVS: Denies Chest pain  Pulmonary: Denies wheezing, SOB  GI: Denies N/V  : Denies dysuria  Musculoskeletal:  Denies myalgia  Neuro: Denies focal weakness or numbness.  Skin: Denies Rashes.  *Review of Systems is negative unless otherwise mentioned in HPI  "or ROS above.    Objective   /64   Pulse 68   Temp 36.3 °C (97.3 °F)   Ht 1.854 m (6' 1\")   Wt 126 kg (277 lb 6.4 oz)   SpO2 92%   BMI 36.60 kg/m²  reviewed Body mass index is 36.6 kg/m².     Physical Exam  Constitutional: NAD.  Resting comfortably.  Head: Atraumatic, normocephalic.  ENT: Moist oral mucosa. Nasal mucosa wnl.   Cardiac: Regular rate & rhythm.   Pulmonary: Lungs clear bilat  GI: Soft, Nontender, nondistended.   Musculoskeletal: No peripheral edema.   Skin: No evidence of trauma. No rashes Vascular insufficiency noted.  Psych: Intact judgement and insight.    .Assessment/Plan   Problem List Items Addressed This Visit             ICD-10-CM    Anxiety F41.9    Relevant Medications    hydrOXYzine HCL (Atarax) 25 mg tablet    Type 2 diabetes mellitus without complication, without long-term current use of insulin (Multi) - Primary E11.9    Relevant Medications    metFORMIN (Glucophage) 500 mg tablet       "

## 2025-02-07 DIAGNOSIS — M54.50 LOW BACK PAIN WITH RADIATION: ICD-10-CM

## 2025-02-07 RX ORDER — GABAPENTIN 600 MG/1
600 TABLET ORAL 2 TIMES DAILY
Qty: 60 TABLET | Refills: 0 | Status: SHIPPED | OUTPATIENT
Start: 2025-02-07

## 2025-02-11 ENCOUNTER — APPOINTMENT (OUTPATIENT)
Dept: PRIMARY CARE | Facility: CLINIC | Age: 65
End: 2025-02-11
Payer: COMMERCIAL

## 2025-02-11 PROCEDURE — 69210 REMOVE IMPACTED EAR WAX UNI: CPT | Performed by: PHYSICIAN ASSISTANT

## 2025-02-24 DIAGNOSIS — Z86.711 HISTORY OF PULMONARY EMBOLISM: ICD-10-CM

## 2025-02-26 ENCOUNTER — PATIENT MESSAGE (OUTPATIENT)
Dept: PRIMARY CARE | Facility: CLINIC | Age: 65
End: 2025-02-26
Payer: COMMERCIAL

## 2025-02-26 DIAGNOSIS — E11.9 TYPE 2 DIABETES MELLITUS WITHOUT COMPLICATION, WITHOUT LONG-TERM CURRENT USE OF INSULIN (MULTI): ICD-10-CM

## 2025-02-26 DIAGNOSIS — I10 PRIMARY HYPERTENSION: ICD-10-CM

## 2025-02-26 DIAGNOSIS — E78.2 MIXED HYPERLIPIDEMIA: ICD-10-CM

## 2025-02-27 RX ORDER — ATORVASTATIN CALCIUM 10 MG/1
10 TABLET, FILM COATED ORAL DAILY
Qty: 90 TABLET | Refills: 3 | Status: SHIPPED | OUTPATIENT
Start: 2025-02-27

## 2025-03-06 DIAGNOSIS — I10 PRIMARY HYPERTENSION: ICD-10-CM

## 2025-03-06 RX ORDER — METOPROLOL TARTRATE 25 MG/1
25 TABLET, FILM COATED ORAL 2 TIMES DAILY
Qty: 180 TABLET | Refills: 3 | Status: SHIPPED | OUTPATIENT
Start: 2025-03-06

## 2025-03-08 ENCOUNTER — HOSPITAL ENCOUNTER (EMERGENCY)
Facility: HOSPITAL | Age: 65
Discharge: HOME | End: 2025-03-08
Attending: EMERGENCY MEDICINE
Payer: COMMERCIAL

## 2025-03-08 ENCOUNTER — APPOINTMENT (OUTPATIENT)
Dept: RADIOLOGY | Facility: HOSPITAL | Age: 65
End: 2025-03-08
Payer: COMMERCIAL

## 2025-03-08 VITALS
DIASTOLIC BLOOD PRESSURE: 74 MMHG | OXYGEN SATURATION: 95 % | TEMPERATURE: 98.1 F | WEIGHT: 289.24 LBS | BODY MASS INDEX: 38.33 KG/M2 | RESPIRATION RATE: 18 BRPM | SYSTOLIC BLOOD PRESSURE: 135 MMHG | HEIGHT: 73 IN | HEART RATE: 91 BPM

## 2025-03-08 DIAGNOSIS — L03.116 CELLULITIS OF LEFT LOWER LIMB: Primary | ICD-10-CM

## 2025-03-08 PROCEDURE — 2500000001 HC RX 250 WO HCPCS SELF ADMINISTERED DRUGS (ALT 637 FOR MEDICARE OP): Performed by: PHYSICIAN ASSISTANT

## 2025-03-08 PROCEDURE — 93971 EXTREMITY STUDY: CPT | Mod: FOREIGN READ | Performed by: RADIOLOGY

## 2025-03-08 PROCEDURE — 99284 EMERGENCY DEPT VISIT MOD MDM: CPT | Mod: 25 | Performed by: EMERGENCY MEDICINE

## 2025-03-08 PROCEDURE — 93971 EXTREMITY STUDY: CPT

## 2025-03-08 RX ORDER — DOXYCYCLINE 100 MG/1
100 CAPSULE ORAL ONCE
Status: COMPLETED | OUTPATIENT
Start: 2025-03-08 | End: 2025-03-08

## 2025-03-08 RX ORDER — DOXYCYCLINE 100 MG/1
100 CAPSULE ORAL 2 TIMES DAILY
Qty: 20 CAPSULE | Refills: 0 | Status: SHIPPED | OUTPATIENT
Start: 2025-03-08 | End: 2025-03-18

## 2025-03-08 RX ORDER — CEPHALEXIN 500 MG/1
500 CAPSULE ORAL ONCE
Status: COMPLETED | OUTPATIENT
Start: 2025-03-08 | End: 2025-03-08

## 2025-03-08 RX ORDER — FUROSEMIDE 20 MG/1
20 TABLET ORAL DAILY
Qty: 7 TABLET | Refills: 0 | Status: SHIPPED | OUTPATIENT
Start: 2025-03-08 | End: 2025-03-15

## 2025-03-08 RX ADMIN — DOXYCYCLINE HYCLATE 100 MG: 100 CAPSULE ORAL at 20:43

## 2025-03-08 RX ADMIN — CEPHALEXIN 500 MG: 500 CAPSULE ORAL at 20:43

## 2025-03-08 ASSESSMENT — PAIN DESCRIPTION - LOCATION: LOCATION: FOOT

## 2025-03-08 ASSESSMENT — PAIN DESCRIPTION - PAIN TYPE: TYPE: ACUTE PAIN

## 2025-03-08 ASSESSMENT — PAIN DESCRIPTION - DESCRIPTORS
DESCRIPTORS: SORE
DESCRIPTORS: SORE

## 2025-03-08 ASSESSMENT — PAIN - FUNCTIONAL ASSESSMENT: PAIN_FUNCTIONAL_ASSESSMENT: 0-10

## 2025-03-08 ASSESSMENT — PAIN SCALES - GENERAL: PAINLEVEL_OUTOF10: 8

## 2025-03-08 ASSESSMENT — PAIN DESCRIPTION - ORIENTATION: ORIENTATION: LEFT

## 2025-03-08 ASSESSMENT — PAIN DESCRIPTION - PROGRESSION: CLINICAL_PROGRESSION: NOT CHANGED

## 2025-03-08 ASSESSMENT — PAIN DESCRIPTION - ONSET: ONSET: SUDDEN

## 2025-03-08 ASSESSMENT — PAIN DESCRIPTION - FREQUENCY: FREQUENCY: CONSTANT/CONTINUOUS

## 2025-03-09 NOTE — DISCHARGE INSTRUCTIONS
Thank you for choosing [facility]. It was my pleasure to be involved in your care today.         As of today's visit, based on reasonable likelihood, that it is safe for you to be discharged back to your residence to follow-up as an outpatient for ongoing management of your medical problem. You should follow-up with any referrals / primary provider as soon as possible. The contacts (number, addresses) are listed below.         Important:  Even though we think it is safe for you to go home, there is always a small chance that we are missing something that could require hospitalization.  Therefore it is very important that if you get worse or develops any new symptoms that you return here as soon as possible to be re-evaluated.  This includes return of symptoms that have resolved such as fainting, chest pain, or symptoms that could be warning signs for stroke important:  Even though we think it is safe for you to go home, there is always a small chance that we are missing something that could require hospitalization.  Therefore it is very important that if you get worse or develops any new symptoms that you return here as soon as possible to be re-evaluated.  This includes return of symptoms that have resolved such as fainting, chest pain, or symptoms that could be warning signs for stroke         Make sure your pharmacy and primary doctor is aware of any new medications prescribed today.          It is your responsibility to contact as soon as possible, and follow through with, any referrals you were given today. We do recommend you inform them you are a [facility] follow-up patient, as often they can better accommodate your need to be seen, provided their schedules allow. We will, and have, made every effort to ensure you have access to adequate follow-up specialists available.          All problems may not be able to be fixed in one ER visit. This is why timely ongoing care is important, and this  is a responsibility you share in. Further, you are free to follow up with any provider you choose, and this is not limited to our suggestion.          If cultures were obtained today, you will be contacted should anything result that would require further treatment. Please contact the ED at the number provided with questions.          Having trouble affording medications? Try [email or web address]! (This is not a hospital endorsed website, merely a recommendation based on my own personal experiences with [employer])

## 2025-03-09 NOTE — ED PROVIDER NOTES
HPI   Chief Complaint   Patient presents with    Leg Pain     For the past week and a half my lt leg has been sore and for the past 2 days it has swollen gotten red and is warm to the touch I take eloquent because I have a past medical hx of DVT's        64-year-old male presented emergency department with a chief complaint of swelling to his left lower leg.  History of pulmonary embolism.  On anticoagulant has not missed any doses.  There is concern for secondary cellulitic infection.  Denies lightheadedness dizziness numbness weakness.  Well-appearing nontoxic afebrile.  Denies fall or injury or trauma.  Denies chest pain or shortness of breath.  No other complaint.              Patient History   Past Medical History:   Diagnosis Date    Allergic     Anxiety     Arthritis 09/01/2023    Clotting disorder (Multi)     Depression     Difficulty walking 2003    HL (hearing loss) 05/01/2023    Hypertension 2000?    Pulmonary embolism     Urinary tract infection 01/01/2015     Past Surgical History:   Procedure Laterality Date    BACK SURGERY  01/01/2002    CHOLECYSTECTOMY  01/01/2008    HERNIA REPAIR  01/01/2011    JOINT REPLACEMENT  01/01/2010    SPINE SURGERY  01/01/2002    TOENAIL EXCISION  2019    TONSILLECTOMY  01/01/1971    WISDOM TOOTH EXTRACTION  01/01/1998     Family History   Problem Relation Name Age of Onset    Mental illness Mother Chey     Hypertension Mother Chey     Diabetes Mother Chey     Peripheral vascular disease Father Sd     Diabetes Father Ds     Hypertension Father Sd     Cancer Father Sd     No Known Problems Sister      No Known Problems Daughter      No Known Problems Son      Diabetes Other fam hx     Heart disease Other fam hx     Stroke Other fam hx     Hypertension Other fam hx     Cancer Other fam hx      Social History     Tobacco Use    Smoking status: Former     Current packs/day: 0.00     Average packs/day: 1 pack/day for 14.7 years (14.7 ttl pk-yrs)     Types: Cigarettes, Cigars      Start date: 1974     Quit date: 1989     Years since quittin.1     Passive exposure: Never    Smokeless tobacco: Former   Substance Use Topics    Alcohol use: Yes     Alcohol/week: 1.0 standard drink of alcohol     Types: 1 Cans of beer per week     Comment: Sometimes 3    Drug use: Never       Physical Exam   ED Triage Vitals [25 1813]   Temperature Heart Rate Respirations BP   36.7 °C (98.1 °F) 91 18 135/74      Pulse Ox Temp Source Heart Rate Source Patient Position   95 % Temporal Monitor Sitting      BP Location FiO2 (%)     Left arm --       Physical Exam  Vitals and nursing note reviewed.   Constitutional:       Appearance: Normal appearance.   HENT:      Head: Normocephalic.      Nose: Nose normal.      Mouth/Throat:      Mouth: Mucous membranes are moist.   Cardiovascular:      Rate and Rhythm: Normal rate and regular rhythm.      Pulses: Normal pulses.      Heart sounds: Normal heart sounds.   Pulmonary:      Effort: Pulmonary effort is normal.      Breath sounds: Normal breath sounds.   Abdominal:      General: Abdomen is flat.      Palpations: Abdomen is soft.   Musculoskeletal:         General: Normal range of motion.      Cervical back: Normal range of motion and neck supple.      Comments: Swelling to the left lower extremity, there is minimal erythema and warmth to the distal medial left lower extremity   Skin:     General: Skin is warm.   Neurological:      General: No focal deficit present.      Mental Status: He is alert and oriented to person, place, and time.   Psychiatric:         Mood and Affect: Mood normal.           ED Course & MDM   Diagnoses as of 25   Cellulitis of left lower limb                 No data recorded     Shiraz Coma Scale Score: 15 (25 1830 : Tosha Kaur RN)                           Medical Decision Making  I have seen and evaluated this patient.  The attending physician has also seen and evaluated this patient.  Vital signs,  laboratory testing and diagnostic images if applicable have been reviewed.  All laboratory and imaging is interpreted by myself unless otherwise stated.  Radiology studies are also formally interpreted by radiologist.     Negative for deep vein thrombosis, patient appears mildly volume overloaded.  Most consistent with cellulitic infection and started on antibiotic.  Increasingly sick for couple days.  Released in stable condition.    Labs Reviewed - No data to display  Lower extremity venous duplex left   Final Result    No evidence for DVT within the left lower extremity.    No significant change compared to prior ultrasounds.    Signed by Fletcher Robledo MD     Medications  doxycycline (Vibramycin) capsule 100 mg (100 mg oral Given 3/8/25 2043)  cephalexin (Keflex) capsule 500 mg (500 mg oral Given 3/8/25 2043)  New Prescriptions  DOXYCYCLINE (VIBRAMYCIN) 100 MG CAPSULE     Take 1 capsule (100 mg) by mouth 2 times a day for 10 days. Take with at least 8 ounces (large glass) of water, do not lie down for 30 minutes after  FUROSEMIDE (LASIX) 20 MG TABLET     Take 1 tablet (20 mg) by mouth once daily for 7 days.            Procedure  Procedures     Amos Asif PA-C  03/08/25 2044

## 2025-03-14 ENCOUNTER — HOSPITAL ENCOUNTER (INPATIENT)
Facility: HOSPITAL | Age: 65
LOS: 1 days | Discharge: HOME | DRG: 301 | End: 2025-03-15
Attending: STUDENT IN AN ORGANIZED HEALTH CARE EDUCATION/TRAINING PROGRAM | Admitting: STUDENT IN AN ORGANIZED HEALTH CARE EDUCATION/TRAINING PROGRAM
Payer: COMMERCIAL

## 2025-03-14 ENCOUNTER — APPOINTMENT (OUTPATIENT)
Dept: RADIOLOGY | Facility: HOSPITAL | Age: 65
DRG: 301 | End: 2025-03-14
Payer: COMMERCIAL

## 2025-03-14 DIAGNOSIS — L03.116 CELLULITIS OF LEFT LOWER EXTREMITY: Primary | ICD-10-CM

## 2025-03-14 PROBLEM — E11.42 TYPE 2 DIABETES MELLITUS WITH PERIPHERAL NEUROPATHY: Status: ACTIVE | Noted: 2024-10-14

## 2025-03-14 PROBLEM — I87.8 VENOUS STASIS OF LOWER EXTREMITY: Status: ACTIVE | Noted: 2025-03-14

## 2025-03-14 LAB
ALBUMIN SERPL BCP-MCNC: 3.9 G/DL (ref 3.4–5)
ALP SERPL-CCNC: 68 U/L (ref 33–136)
ALT SERPL W P-5'-P-CCNC: 13 U/L (ref 10–52)
ANION GAP SERPL CALCULATED.3IONS-SCNC: 9 MMOL/L (ref 10–20)
AST SERPL W P-5'-P-CCNC: 19 U/L (ref 9–39)
BASOPHILS # BLD AUTO: 0.08 X10*3/UL (ref 0–0.1)
BASOPHILS NFR BLD AUTO: 0.7 %
BILIRUB SERPL-MCNC: 0.4 MG/DL (ref 0–1.2)
BUN SERPL-MCNC: 19 MG/DL (ref 6–23)
CALCIUM SERPL-MCNC: 9.8 MG/DL (ref 8.6–10.3)
CHLORIDE SERPL-SCNC: 103 MMOL/L (ref 98–107)
CO2 SERPL-SCNC: 26 MMOL/L (ref 21–32)
CREAT SERPL-MCNC: 0.92 MG/DL (ref 0.5–1.3)
EGFRCR SERPLBLD CKD-EPI 2021: >90 ML/MIN/1.73M*2
EOSINOPHIL # BLD AUTO: 0.52 X10*3/UL (ref 0–0.7)
EOSINOPHIL NFR BLD AUTO: 4.7 %
ERYTHROCYTE [DISTWIDTH] IN BLOOD BY AUTOMATED COUNT: 13.4 % (ref 11.5–14.5)
GLUCOSE SERPL-MCNC: 97 MG/DL (ref 74–99)
HCT VFR BLD AUTO: 42.6 % (ref 41–52)
HGB BLD-MCNC: 13.9 G/DL (ref 13.5–17.5)
IMM GRANULOCYTES # BLD AUTO: 0.05 X10*3/UL (ref 0–0.7)
IMM GRANULOCYTES NFR BLD AUTO: 0.5 % (ref 0–0.9)
LACTATE SERPL-SCNC: 1.4 MMOL/L (ref 0.4–2)
LYMPHOCYTES # BLD AUTO: 2.89 X10*3/UL (ref 1.2–4.8)
LYMPHOCYTES NFR BLD AUTO: 26.3 %
MCH RBC QN AUTO: 28.3 PG (ref 26–34)
MCHC RBC AUTO-ENTMCNC: 32.6 G/DL (ref 32–36)
MCV RBC AUTO: 87 FL (ref 80–100)
MONOCYTES # BLD AUTO: 1.08 X10*3/UL (ref 0.1–1)
MONOCYTES NFR BLD AUTO: 9.8 %
NEUTROPHILS # BLD AUTO: 6.37 X10*3/UL (ref 1.2–7.7)
NEUTROPHILS NFR BLD AUTO: 58 %
NRBC BLD-RTO: 0 /100 WBCS (ref 0–0)
PLATELET # BLD AUTO: 362 X10*3/UL (ref 150–450)
POTASSIUM SERPL-SCNC: 4 MMOL/L (ref 3.5–5.3)
PROT SERPL-MCNC: 7.7 G/DL (ref 6.4–8.2)
RBC # BLD AUTO: 4.91 X10*6/UL (ref 4.5–5.9)
SODIUM SERPL-SCNC: 134 MMOL/L (ref 136–145)
WBC # BLD AUTO: 11 X10*3/UL (ref 4.4–11.3)

## 2025-03-14 PROCEDURE — 99221 1ST HOSP IP/OBS SF/LOW 40: CPT | Performed by: STUDENT IN AN ORGANIZED HEALTH CARE EDUCATION/TRAINING PROGRAM

## 2025-03-14 PROCEDURE — 1100000001 HC PRIVATE ROOM DAILY

## 2025-03-14 PROCEDURE — 36415 COLL VENOUS BLD VENIPUNCTURE: CPT | Performed by: NURSE PRACTITIONER

## 2025-03-14 PROCEDURE — 2500000001 HC RX 250 WO HCPCS SELF ADMINISTERED DRUGS (ALT 637 FOR MEDICARE OP): Performed by: NURSE PRACTITIONER

## 2025-03-14 PROCEDURE — 99285 EMERGENCY DEPT VISIT HI MDM: CPT | Mod: 25

## 2025-03-14 PROCEDURE — 93971 EXTREMITY STUDY: CPT

## 2025-03-14 PROCEDURE — 85025 COMPLETE CBC W/AUTO DIFF WBC: CPT | Performed by: NURSE PRACTITIONER

## 2025-03-14 PROCEDURE — 2500000004 HC RX 250 GENERAL PHARMACY W/ HCPCS (ALT 636 FOR OP/ED): Performed by: NURSE PRACTITIONER

## 2025-03-14 PROCEDURE — 87040 BLOOD CULTURE FOR BACTERIA: CPT | Mod: TRILAB | Performed by: NURSE PRACTITIONER

## 2025-03-14 PROCEDURE — 2500000004 HC RX 250 GENERAL PHARMACY W/ HCPCS (ALT 636 FOR OP/ED): Performed by: STUDENT IN AN ORGANIZED HEALTH CARE EDUCATION/TRAINING PROGRAM

## 2025-03-14 PROCEDURE — 80053 COMPREHEN METABOLIC PANEL: CPT | Performed by: NURSE PRACTITIONER

## 2025-03-14 PROCEDURE — 93971 EXTREMITY STUDY: CPT | Performed by: SURGERY

## 2025-03-14 PROCEDURE — 96374 THER/PROPH/DIAG INJ IV PUSH: CPT

## 2025-03-14 PROCEDURE — 83605 ASSAY OF LACTIC ACID: CPT | Performed by: NURSE PRACTITIONER

## 2025-03-14 PROCEDURE — 2500000001 HC RX 250 WO HCPCS SELF ADMINISTERED DRUGS (ALT 637 FOR MEDICARE OP): Performed by: STUDENT IN AN ORGANIZED HEALTH CARE EDUCATION/TRAINING PROGRAM

## 2025-03-14 RX ORDER — ACETAMINOPHEN 500 MG
5 TABLET ORAL NIGHTLY PRN
Status: DISCONTINUED | OUTPATIENT
Start: 2025-03-14 | End: 2025-03-15 | Stop reason: HOSPADM

## 2025-03-14 RX ORDER — OXYCODONE AND ACETAMINOPHEN 5; 325 MG/1; MG/1
1 TABLET ORAL EVERY 6 HOURS PRN
Status: DISCONTINUED | OUTPATIENT
Start: 2025-03-14 | End: 2025-03-15 | Stop reason: HOSPADM

## 2025-03-14 RX ORDER — CEFTRIAXONE 1 G/50ML
1 INJECTION, SOLUTION INTRAVENOUS ONCE
Status: COMPLETED | OUTPATIENT
Start: 2025-03-14 | End: 2025-03-14

## 2025-03-14 RX ORDER — VANCOMYCIN 1.5 G/300ML
1500 INJECTION, SOLUTION INTRAVENOUS EVERY 12 HOURS
Status: DISCONTINUED | OUTPATIENT
Start: 2025-03-15 | End: 2025-03-15

## 2025-03-14 RX ORDER — INSULIN LISPRO 100 [IU]/ML
0-5 INJECTION, SOLUTION INTRAVENOUS; SUBCUTANEOUS
Status: DISCONTINUED | OUTPATIENT
Start: 2025-03-15 | End: 2025-03-15 | Stop reason: HOSPADM

## 2025-03-14 RX ORDER — ONDANSETRON HYDROCHLORIDE 2 MG/ML
4 INJECTION, SOLUTION INTRAVENOUS EVERY 8 HOURS PRN
Status: DISCONTINUED | OUTPATIENT
Start: 2025-03-14 | End: 2025-03-15 | Stop reason: HOSPADM

## 2025-03-14 RX ORDER — DEXTROSE 50 % IN WATER (D50W) INTRAVENOUS SYRINGE
12.5
Status: DISCONTINUED | OUTPATIENT
Start: 2025-03-14 | End: 2025-03-15 | Stop reason: HOSPADM

## 2025-03-14 RX ORDER — FUROSEMIDE 10 MG/ML
40 INJECTION INTRAMUSCULAR; INTRAVENOUS DAILY
Status: DISCONTINUED | OUTPATIENT
Start: 2025-03-15 | End: 2025-03-15 | Stop reason: HOSPADM

## 2025-03-14 RX ORDER — VANCOMYCIN HYDROCHLORIDE 1 G/20ML
INJECTION, POWDER, LYOPHILIZED, FOR SOLUTION INTRAVENOUS DAILY PRN
Status: DISCONTINUED | OUTPATIENT
Start: 2025-03-14 | End: 2025-03-14

## 2025-03-14 RX ORDER — VANCOMYCIN 2 G/400ML
2000 INJECTION, SOLUTION INTRAVENOUS ONCE
Status: COMPLETED | OUTPATIENT
Start: 2025-03-14 | End: 2025-03-15

## 2025-03-14 RX ORDER — ATORVASTATIN CALCIUM 10 MG/1
10 TABLET, FILM COATED ORAL DAILY
Status: DISCONTINUED | OUTPATIENT
Start: 2025-03-15 | End: 2025-03-15 | Stop reason: HOSPADM

## 2025-03-14 RX ORDER — GABAPENTIN 600 MG/1
600 TABLET ORAL 2 TIMES DAILY
Status: DISCONTINUED | OUTPATIENT
Start: 2025-03-14 | End: 2025-03-15 | Stop reason: HOSPADM

## 2025-03-14 RX ORDER — DEXTROSE 50 % IN WATER (D50W) INTRAVENOUS SYRINGE
25
Status: DISCONTINUED | OUTPATIENT
Start: 2025-03-14 | End: 2025-03-15 | Stop reason: HOSPADM

## 2025-03-14 RX ORDER — ONDANSETRON 4 MG/1
4 TABLET, ORALLY DISINTEGRATING ORAL EVERY 8 HOURS PRN
Status: DISCONTINUED | OUTPATIENT
Start: 2025-03-14 | End: 2025-03-15 | Stop reason: HOSPADM

## 2025-03-14 RX ORDER — METOPROLOL TARTRATE 25 MG/1
25 TABLET, FILM COATED ORAL 2 TIMES DAILY
Status: DISCONTINUED | OUTPATIENT
Start: 2025-03-14 | End: 2025-03-15 | Stop reason: HOSPADM

## 2025-03-14 RX ORDER — IPRATROPIUM BROMIDE AND ALBUTEROL SULFATE 2.5; .5 MG/3ML; MG/3ML
3 SOLUTION RESPIRATORY (INHALATION) EVERY 6 HOURS PRN
Status: DISCONTINUED | OUTPATIENT
Start: 2025-03-14 | End: 2025-03-15 | Stop reason: HOSPADM

## 2025-03-14 RX ORDER — SERTRALINE HYDROCHLORIDE 100 MG/1
100 TABLET, FILM COATED ORAL DAILY
Status: DISCONTINUED | OUTPATIENT
Start: 2025-03-15 | End: 2025-03-15 | Stop reason: HOSPADM

## 2025-03-14 RX ORDER — PANTOPRAZOLE SODIUM 40 MG/1
40 TABLET, DELAYED RELEASE ORAL
Status: DISCONTINUED | OUTPATIENT
Start: 2025-03-15 | End: 2025-03-15 | Stop reason: HOSPADM

## 2025-03-14 RX ORDER — CEFTRIAXONE 1 G/50ML
1 INJECTION, SOLUTION INTRAVENOUS EVERY 24 HOURS
Status: DISCONTINUED | OUTPATIENT
Start: 2025-03-15 | End: 2025-03-15 | Stop reason: HOSPADM

## 2025-03-14 RX ORDER — VANCOMYCIN HYDROCHLORIDE 1 G/20ML
INJECTION, POWDER, LYOPHILIZED, FOR SOLUTION INTRAVENOUS DAILY PRN
Status: DISCONTINUED | OUTPATIENT
Start: 2025-03-14 | End: 2025-03-15

## 2025-03-14 RX ORDER — OXYCODONE AND ACETAMINOPHEN 5; 325 MG/1; MG/1
1 TABLET ORAL ONCE
Status: COMPLETED | OUTPATIENT
Start: 2025-03-14 | End: 2025-03-14

## 2025-03-14 RX ORDER — ACETAMINOPHEN 325 MG/1
650 TABLET ORAL EVERY 6 HOURS PRN
Status: DISCONTINUED | OUTPATIENT
Start: 2025-03-14 | End: 2025-03-15 | Stop reason: HOSPADM

## 2025-03-14 RX ADMIN — METOPROLOL TARTRATE 25 MG: 25 TABLET, FILM COATED ORAL at 22:30

## 2025-03-14 RX ADMIN — GABAPENTIN 600 MG: 600 TABLET, FILM COATED ORAL at 22:30

## 2025-03-14 RX ADMIN — VANCOMYCIN 2000 MG: 2 INJECTION, SOLUTION INTRAVENOUS at 22:09

## 2025-03-14 RX ADMIN — CEFTRIAXONE 1 G: 1 INJECTION, SOLUTION INTRAVENOUS at 21:31

## 2025-03-14 RX ADMIN — APIXABAN 5 MG: 5 TABLET, FILM COATED ORAL at 22:30

## 2025-03-14 RX ADMIN — OXYCODONE HYDROCHLORIDE AND ACETAMINOPHEN 1 TABLET: 5; 325 TABLET ORAL at 21:46

## 2025-03-14 SDOH — SOCIAL STABILITY: SOCIAL INSECURITY: DOES ANYONE TRY TO KEEP YOU FROM HAVING/CONTACTING OTHER FRIENDS OR DOING THINGS OUTSIDE YOUR HOME?: NO

## 2025-03-14 SDOH — SOCIAL STABILITY: SOCIAL INSECURITY: WITHIN THE LAST YEAR, HAVE YOU BEEN AFRAID OF YOUR PARTNER OR EX-PARTNER?: NO

## 2025-03-14 SDOH — SOCIAL STABILITY: SOCIAL INSECURITY
WITHIN THE LAST YEAR, HAVE YOU BEEN RAPED OR FORCED TO HAVE ANY KIND OF SEXUAL ACTIVITY BY YOUR PARTNER OR EX-PARTNER?: NO

## 2025-03-14 SDOH — ECONOMIC STABILITY: HOUSING INSECURITY: AT ANY TIME IN THE PAST 12 MONTHS, WERE YOU HOMELESS OR LIVING IN A SHELTER (INCLUDING NOW)?: NO

## 2025-03-14 SDOH — SOCIAL STABILITY: SOCIAL INSECURITY: HAS ANYONE EVER THREATENED TO HURT YOUR FAMILY OR YOUR PETS?: NO

## 2025-03-14 SDOH — SOCIAL STABILITY: SOCIAL INSECURITY
ASK PARENT OR GUARDIAN: ARE THERE TIMES WHEN YOU, YOUR CHILD(REN), OR ANY MEMBER OF YOUR HOUSEHOLD FEEL UNSAFE, HARMED, OR THREATENED AROUND PERSONS WITH WHOM YOU KNOW OR LIVE?: NO

## 2025-03-14 SDOH — SOCIAL STABILITY: SOCIAL INSECURITY: WITHIN THE LAST YEAR, HAVE YOU BEEN HUMILIATED OR EMOTIONALLY ABUSED IN OTHER WAYS BY YOUR PARTNER OR EX-PARTNER?: NO

## 2025-03-14 SDOH — ECONOMIC STABILITY: FOOD INSECURITY: WITHIN THE PAST 12 MONTHS, YOU WORRIED THAT YOUR FOOD WOULD RUN OUT BEFORE YOU GOT THE MONEY TO BUY MORE.: NEVER TRUE

## 2025-03-14 SDOH — ECONOMIC STABILITY: FOOD INSECURITY: WITHIN THE PAST 12 MONTHS, THE FOOD YOU BOUGHT JUST DIDN'T LAST AND YOU DIDN'T HAVE MONEY TO GET MORE.: NEVER TRUE

## 2025-03-14 SDOH — ECONOMIC STABILITY: FOOD INSECURITY: HOW HARD IS IT FOR YOU TO PAY FOR THE VERY BASICS LIKE FOOD, HOUSING, MEDICAL CARE, AND HEATING?: NOT HARD AT ALL

## 2025-03-14 SDOH — SOCIAL STABILITY: SOCIAL INSECURITY: DO YOU FEEL UNSAFE GOING BACK TO THE PLACE WHERE YOU ARE LIVING?: NO

## 2025-03-14 SDOH — ECONOMIC STABILITY: TRANSPORTATION INSECURITY: IN THE PAST 12 MONTHS, HAS LACK OF TRANSPORTATION KEPT YOU FROM MEDICAL APPOINTMENTS OR FROM GETTING MEDICATIONS?: NO

## 2025-03-14 SDOH — ECONOMIC STABILITY: INCOME INSECURITY: IN THE PAST 12 MONTHS HAS THE ELECTRIC, GAS, OIL, OR WATER COMPANY THREATENED TO SHUT OFF SERVICES IN YOUR HOME?: NO

## 2025-03-14 SDOH — SOCIAL STABILITY: SOCIAL INSECURITY: ABUSE: PEDIATRIC

## 2025-03-14 SDOH — ECONOMIC STABILITY: HOUSING INSECURITY: DO YOU FEEL UNSAFE GOING BACK TO THE PLACE WHERE YOU LIVE?: NO

## 2025-03-14 SDOH — SOCIAL STABILITY: SOCIAL INSECURITY: HAVE YOU HAD ANY THOUGHTS OF HARMING ANYONE ELSE?: NO

## 2025-03-14 SDOH — SOCIAL STABILITY: SOCIAL INSECURITY: DO YOU FEEL ANYONE HAS EXPLOITED OR TAKEN ADVANTAGE OF YOU FINANCIALLY OR OF YOUR PERSONAL PROPERTY?: NO

## 2025-03-14 SDOH — ECONOMIC STABILITY: HOUSING INSECURITY: IN THE PAST 12 MONTHS, HOW MANY TIMES HAVE YOU MOVED WHERE YOU WERE LIVING?: 0

## 2025-03-14 SDOH — SOCIAL STABILITY: SOCIAL INSECURITY: HAVE YOU HAD THOUGHTS OF HARMING ANYONE ELSE?: NO

## 2025-03-14 SDOH — ECONOMIC STABILITY: HOUSING INSECURITY: IN THE LAST 12 MONTHS, WAS THERE A TIME WHEN YOU WERE NOT ABLE TO PAY THE MORTGAGE OR RENT ON TIME?: NO

## 2025-03-14 SDOH — SOCIAL STABILITY: SOCIAL INSECURITY: ARE YOU OR HAVE YOU BEEN THREATENED OR ABUSED PHYSICALLY, EMOTIONALLY, OR SEXUALLY BY ANYONE?: NO

## 2025-03-14 SDOH — SOCIAL STABILITY: SOCIAL INSECURITY: ARE THERE ANY APPARENT SIGNS OF INJURIES/BEHAVIORS THAT COULD BE RELATED TO ABUSE/NEGLECT?: NO

## 2025-03-14 SDOH — SOCIAL STABILITY: SOCIAL INSECURITY: WERE YOU ABLE TO COMPLETE ALL THE BEHAVIORAL HEALTH SCREENINGS?: YES

## 2025-03-14 ASSESSMENT — COGNITIVE AND FUNCTIONAL STATUS - GENERAL
PATIENT BASELINE BEDBOUND: NO
DAILY ACTIVITIY SCORE: 23
MOBILITY SCORE: 24
DRESSING REGULAR LOWER BODY CLOTHING: A LITTLE

## 2025-03-14 ASSESSMENT — LIFESTYLE VARIABLES
SKIP TO QUESTIONS 9-10: 1
AUDIT-C TOTAL SCORE: 2
AUDIT-C TOTAL SCORE: 2
SUBSTANCE_ABUSE_PAST_12_MONTHS: NO
PRESCIPTION_ABUSE_PAST_12_MONTHS: NO
HOW MANY STANDARD DRINKS CONTAINING ALCOHOL DO YOU HAVE ON A TYPICAL DAY: 1 OR 2
HOW OFTEN DO YOU HAVE 6 OR MORE DRINKS ON ONE OCCASION: NEVER
HOW OFTEN DO YOU HAVE A DRINK CONTAINING ALCOHOL: 2-4 TIMES A MONTH

## 2025-03-14 ASSESSMENT — ACTIVITIES OF DAILY LIVING (ADL)
LACK_OF_TRANSPORTATION: NO
JUDGMENT_ADEQUATE_SAFELY_COMPLETE_DAILY_ACTIVITIES: YES
FEEDING YOURSELF: INDEPENDENT
TOILETING: INDEPENDENT
HEARING - LEFT EAR: FUNCTIONAL
PATIENT'S MEMORY ADEQUATE TO SAFELY COMPLETE DAILY ACTIVITIES?: YES
BATHING: INDEPENDENT
ADEQUATE_TO_COMPLETE_ADL: YES
WALKS IN HOME: INDEPENDENT
DRESSING YOURSELF: NEEDS ASSISTANCE
ASSISTIVE_DEVICE: REACHER
GROOMING: INDEPENDENT
LACK_OF_TRANSPORTATION: NO
HEARING - RIGHT EAR: FUNCTIONAL

## 2025-03-14 ASSESSMENT — PAIN DESCRIPTION - PROGRESSION: CLINICAL_PROGRESSION: GRADUALLY WORSENING

## 2025-03-14 ASSESSMENT — PAIN - FUNCTIONAL ASSESSMENT
PAIN_FUNCTIONAL_ASSESSMENT: 0-10
PAIN_FUNCTIONAL_ASSESSMENT: 0-10

## 2025-03-14 ASSESSMENT — PAIN DESCRIPTION - LOCATION: LOCATION: LEG

## 2025-03-14 ASSESSMENT — PAIN DESCRIPTION - DESCRIPTORS: DESCRIPTORS: SORE

## 2025-03-14 ASSESSMENT — PAIN SCALES - GENERAL
PAINLEVEL_OUTOF10: 8
PAINLEVEL_OUTOF10: 5 - MODERATE PAIN

## 2025-03-14 ASSESSMENT — PAIN DESCRIPTION - FREQUENCY: FREQUENCY: CONSTANT/CONTINUOUS

## 2025-03-14 ASSESSMENT — PAIN DESCRIPTION - ONSET: ONSET: ONGOING

## 2025-03-14 ASSESSMENT — PAIN DESCRIPTION - ORIENTATION: ORIENTATION: LEFT;LOWER

## 2025-03-14 ASSESSMENT — PAIN DESCRIPTION - PAIN TYPE: TYPE: ACUTE PAIN

## 2025-03-14 NOTE — ED PROVIDER NOTES
HPI   Chief Complaint   Patient presents with    Leg Swelling     Pt reports left lower leg swelling that he was seen for last week. Diagnosed with cellulitis. Pt states his leg is swelling worse than before and came back to be reevaluated.        HPI  See my MDM      Patient History   Past Medical History:   Diagnosis Date    Allergic     Anxiety     Arthritis 2023    Clotting disorder (Multi)     Depression     Difficulty walking     HL (hearing loss) 2023    Hypertension 2000?    Pulmonary embolism     Urinary tract infection 2015     Past Surgical History:   Procedure Laterality Date    BACK SURGERY  2002    CHOLECYSTECTOMY  2008    HERNIA REPAIR  2011    JOINT REPLACEMENT  2010    SPINE SURGERY  2002    TOENAIL EXCISION  2019    TONSILLECTOMY  1971    WISDOM TOOTH EXTRACTION  1998     Family History   Problem Relation Name Age of Onset    Mental illness Mother Chey     Hypertension Mother Chey     Diabetes Mother Chey     Peripheral vascular disease Father Sd     Diabetes Father Sd     Hypertension Father Sd     Cancer Father Sd     No Known Problems Sister      No Known Problems Daughter      No Known Problems Son      Diabetes Other fam hx     Heart disease Other fam hx     Stroke Other fam hx     Hypertension Other fam hx     Cancer Other fam hx      Social History     Tobacco Use    Smoking status: Former     Current packs/day: 0.00     Average packs/day: 1 pack/day for 14.7 years (14.7 ttl pk-yrs)     Types: Cigarettes, Cigars     Start date: 1974     Quit date: 1989     Years since quittin.1     Passive exposure: Never    Smokeless tobacco: Former   Substance Use Topics    Alcohol use: Yes     Alcohol/week: 1.0 standard drink of alcohol     Types: 1 Cans of beer per week     Comment: Sometimes 3    Drug use: Never       Physical Exam   ED Triage Vitals [25]   Temperature Heart Rate Respirations BP   36.9 °C (98.4  °F) 62 18 155/71      Pulse Ox Temp Source Heart Rate Source Patient Position   (!) 93 % Tympanic Monitor Sitting      BP Location FiO2 (%)     Right arm --       Physical Exam  CONSTITUTIONAL: Vital signs reviewed as charted, well-developed and in no distress  Eyes: Extraocular muscles are intact. Pupils equal round and reactive to light. Conjunctiva are pink.    ENT: Mucous membranes are moist. Tongue in the midline. Pharynx was without erythema or exudates, uvula midline  LUNGS: Breath sounds equal and clear to auscultation. Good air exchange, no wheezes rales or retractions, pulse oximetry is charted.  HEART: Regular rate and rhythm without murmur thrill or rub, strong tones, auscultation is normal.  ABDOMEN: Soft and nontender without guarding rebound rigidity or mass. Bowel sounds are present and normal in all quadrants. There is no palpable masses or aneurysms identified. No hepatosplenomegaly, normal abdominal exam.  Neuro: The patient is awake, alert and oriented ×3. Moving all 4 extremities and answering questions appropriately.   MUSCULOSKELETAL: Exam the left leg shows a large edema.  Cap refill is less than 2 seconds.  Dorsal pedis posterior tibial pulses are graded 2+ and equal bilaterally.  There is some mild erythremia over the distal third of the lower leg.    PSYCH: Awake alert oriented, normal mood and affect.  Skin:  Dry, normal color, warm to the touch, no rash present.        ED Course & MDM   Diagnoses as of 03/14/25 2134   Cellulitis of left lower extremity                 No data recorded     Collins Coma Scale Score: 15 (03/14/25 1928 : Laine Boykin RN)                           Medical Decision Making  History obtained from: patient    Vital signs, nursing notes, current medications, past medical history, Surgical history, allergies, social history, family History were reviewed.         HPI:  Patient 64-year-old gentleman presenting emergency room today concern for worsening  cellulitis of the left leg.  Also states he is prone to DVTs.  Is on anticoagulants.  Was here on Thursday started on oral antibiotics and has had worsening symptoms.  No fevers.  No nausea vomiting diarrhea.      10 point ROS was reviewed and negative except Noted above in HPI.  DDX: as listed above          MDM Summary/considerations:  Labs Reviewed   CBC WITH AUTO DIFFERENTIAL - Abnormal       Result Value    WBC 11.0      nRBC 0.0      RBC 4.91      Hemoglobin 13.9      Hematocrit 42.6      MCV 87      MCH 28.3      MCHC 32.6      RDW 13.4      Platelets 362      Neutrophils % 58.0      Immature Granulocytes %, Automated 0.5      Lymphocytes % 26.3      Monocytes % 9.8      Eosinophils % 4.7      Basophils % 0.7      Neutrophils Absolute 6.37      Immature Granulocytes Absolute, Automated 0.05      Lymphocytes Absolute 2.89      Monocytes Absolute 1.08 (*)     Eosinophils Absolute 0.52      Basophils Absolute 0.08     COMPREHENSIVE METABOLIC PANEL - Abnormal    Glucose 97      Sodium 134 (*)     Potassium 4.0      Chloride 103      Bicarbonate 26      Anion Gap 9 (*)     Urea Nitrogen 19      Creatinine 0.92      eGFR >90      Calcium 9.8      Albumin 3.9      Alkaline Phosphatase 68      Total Protein 7.7      AST 19      Bilirubin, Total 0.4      ALT 13     LACTATE - Normal    Lactate 1.4      Narrative:     Venipuncture immediately after or during the administration of Metamizole may lead to falsely low results. Testing should be performed immediately prior to Metamizole dosing.   BLOOD CULTURE   BLOOD CULTURE     Lower extremity venous duplex left   Final Result   Negative study.  No deep venous thrombosis of the  left lower   extremity.        There is a small subcutaneous irregular fluid collection in the   popliteal fossa with some debris, also present previously, suspected   to represent small Baker's cyst.        Diffuse subcutaneous edema.        MACRO:   None        Signed by: Melvin Trevino 3/14/2025  9:09 PM   Dictation workstation:   [id number]        Medications   cefTRIAXone (Rocephin) 1 g in dextrose (iso) IV 50 mL (1 g intravenous New Bag 3/14/25 2136)   vancomycin (Vancocin) pharmacy to dose - pharmacy monitoring (has no administration in time range)     New Prescriptions    No medications on file       I spoke with Dr. Connor. We thoroughly discussed the history, physical exam, laboratory and imaging studies, as well as, emergency department course. Based upon that discussion, we've decided to admit for further observation and evaluation of their cellulitis.  As I have deemed necessary from their history, physical, and studies, I have considered and evaluated for the following diagnoses: DEEP SPACE INFECTIONS, DEEP VENOUS THROMBOSIS, RITU'S GANGRENE, S EPSIS, and TOXIC SHOCK SYNDROME.     Patient leukocytosis 11,000 but otherwise grossly unremarkable workup.  Negative ultrasound.  Patient will be admitted for further evaluation and care and IV antibiotics.  He failed outpatient treatment with oral antibiotics.    After reviewing patient's comorbidities, severity of history of presenting illness, labs and imaging if obtained in conjunction with physical exam and course in emergency department, deemed to have potential for deterioration/progression of symptoms that could lead to multiple morbidities or mortality, decision made that patient requires further observation/evaluation/treatment and patient admitted to appropriate service, patient/family understand and agree with plan.    Discussed H&P with supervising physician, aware of results and agrees with plan/ disposition.      Critical Care: Not warranted at this time        This chart was completed using voice recognition transcription software. Please excuse any errors of transcription including grammatical, punctuation, syntax and spelling errors.  Please contact me with any questions regarding this chart.    Procedure  Procedures     Mayito WHITNEY  KURT Carmona-CNP  03/14/25 [id number]

## 2025-03-15 VITALS
WEIGHT: 284.17 LBS | BODY MASS INDEX: 37.66 KG/M2 | HEART RATE: 65 BPM | HEIGHT: 73 IN | RESPIRATION RATE: 14 BRPM | DIASTOLIC BLOOD PRESSURE: 82 MMHG | TEMPERATURE: 99 F | SYSTOLIC BLOOD PRESSURE: 140 MMHG | OXYGEN SATURATION: 97 %

## 2025-03-15 LAB
ANION GAP SERPL CALCULATED.3IONS-SCNC: 12 MMOL/L (ref 10–20)
BUN SERPL-MCNC: 20 MG/DL (ref 6–23)
CALCIUM SERPL-MCNC: 9.3 MG/DL (ref 8.6–10.3)
CHLORIDE SERPL-SCNC: 102 MMOL/L (ref 98–107)
CO2 SERPL-SCNC: 25 MMOL/L (ref 21–32)
CREAT SERPL-MCNC: 0.93 MG/DL (ref 0.5–1.3)
EGFRCR SERPLBLD CKD-EPI 2021: >90 ML/MIN/1.73M*2
ERYTHROCYTE [DISTWIDTH] IN BLOOD BY AUTOMATED COUNT: 13.6 % (ref 11.5–14.5)
GLUCOSE BLD MANUAL STRIP-MCNC: 99 MG/DL (ref 74–99)
GLUCOSE SERPL-MCNC: 98 MG/DL (ref 74–99)
HCT VFR BLD AUTO: 42.6 % (ref 41–52)
HGB BLD-MCNC: 13.5 G/DL (ref 13.5–17.5)
MCH RBC QN AUTO: 28.5 PG (ref 26–34)
MCHC RBC AUTO-ENTMCNC: 31.7 G/DL (ref 32–36)
MCV RBC AUTO: 90 FL (ref 80–100)
NRBC BLD-RTO: 0 /100 WBCS (ref 0–0)
PLATELET # BLD AUTO: 322 X10*3/UL (ref 150–450)
POTASSIUM SERPL-SCNC: 4.2 MMOL/L (ref 3.5–5.3)
RBC # BLD AUTO: 4.74 X10*6/UL (ref 4.5–5.9)
SODIUM SERPL-SCNC: 135 MMOL/L (ref 136–145)
VANCOMYCIN SERPL-MCNC: 16.3 UG/ML (ref 5–20)
WBC # BLD AUTO: 10.4 X10*3/UL (ref 4.4–11.3)

## 2025-03-15 PROCEDURE — 2500000002 HC RX 250 W HCPCS SELF ADMINISTERED DRUGS (ALT 637 FOR MEDICARE OP, ALT 636 FOR OP/ED): Performed by: STUDENT IN AN ORGANIZED HEALTH CARE EDUCATION/TRAINING PROGRAM

## 2025-03-15 PROCEDURE — 2500000001 HC RX 250 WO HCPCS SELF ADMINISTERED DRUGS (ALT 637 FOR MEDICARE OP): Performed by: STUDENT IN AN ORGANIZED HEALTH CARE EDUCATION/TRAINING PROGRAM

## 2025-03-15 PROCEDURE — 99239 HOSP IP/OBS DSCHRG MGMT >30: CPT | Performed by: INTERNAL MEDICINE

## 2025-03-15 PROCEDURE — 80202 ASSAY OF VANCOMYCIN: CPT | Performed by: STUDENT IN AN ORGANIZED HEALTH CARE EDUCATION/TRAINING PROGRAM

## 2025-03-15 PROCEDURE — 85027 COMPLETE CBC AUTOMATED: CPT | Performed by: STUDENT IN AN ORGANIZED HEALTH CARE EDUCATION/TRAINING PROGRAM

## 2025-03-15 PROCEDURE — 80048 BASIC METABOLIC PNL TOTAL CA: CPT | Performed by: STUDENT IN AN ORGANIZED HEALTH CARE EDUCATION/TRAINING PROGRAM

## 2025-03-15 PROCEDURE — 82947 ASSAY GLUCOSE BLOOD QUANT: CPT

## 2025-03-15 PROCEDURE — 2500000004 HC RX 250 GENERAL PHARMACY W/ HCPCS (ALT 636 FOR OP/ED): Performed by: STUDENT IN AN ORGANIZED HEALTH CARE EDUCATION/TRAINING PROGRAM

## 2025-03-15 PROCEDURE — 36415 COLL VENOUS BLD VENIPUNCTURE: CPT | Performed by: STUDENT IN AN ORGANIZED HEALTH CARE EDUCATION/TRAINING PROGRAM

## 2025-03-15 RX ORDER — VANCOMYCIN 1 G/200ML
1 INJECTION, SOLUTION INTRAVENOUS EVERY 12 HOURS
Status: DISCONTINUED | OUTPATIENT
Start: 2025-03-15 | End: 2025-03-15

## 2025-03-15 RX ADMIN — OXYCODONE HYDROCHLORIDE AND ACETAMINOPHEN 1 TABLET: 5; 325 TABLET ORAL at 10:49

## 2025-03-15 RX ADMIN — GABAPENTIN 600 MG: 600 TABLET, FILM COATED ORAL at 10:10

## 2025-03-15 RX ADMIN — APIXABAN 5 MG: 5 TABLET, FILM COATED ORAL at 10:10

## 2025-03-15 RX ADMIN — FUROSEMIDE 40 MG: 10 INJECTION, SOLUTION INTRAMUSCULAR; INTRAVENOUS at 10:10

## 2025-03-15 RX ADMIN — ATORVASTATIN CALCIUM 10 MG: 10 TABLET, FILM COATED ORAL at 10:10

## 2025-03-15 RX ADMIN — SERTRALINE 100 MG: 100 TABLET, FILM COATED ORAL at 10:10

## 2025-03-15 RX ADMIN — PANTOPRAZOLE SODIUM 40 MG: 40 TABLET, DELAYED RELEASE ORAL at 07:20

## 2025-03-15 RX ADMIN — OXYCODONE HYDROCHLORIDE AND ACETAMINOPHEN 1 TABLET: 5; 325 TABLET ORAL at 03:23

## 2025-03-15 ASSESSMENT — COGNITIVE AND FUNCTIONAL STATUS - GENERAL
DAILY ACTIVITIY SCORE: 24
MOBILITY SCORE: 24

## 2025-03-15 ASSESSMENT — PAIN SCALES - GENERAL
PAINLEVEL_OUTOF10: 7
PAINLEVEL_OUTOF10: 0 - NO PAIN
PAINLEVEL_OUTOF10: 0 - NO PAIN
PAINLEVEL_OUTOF10: 7

## 2025-03-15 ASSESSMENT — PAIN DESCRIPTION - ORIENTATION
ORIENTATION: LEFT
ORIENTATION: LEFT;LOWER

## 2025-03-15 ASSESSMENT — PAIN - FUNCTIONAL ASSESSMENT: PAIN_FUNCTIONAL_ASSESSMENT: UNABLE TO SELF-REPORT

## 2025-03-15 ASSESSMENT — PAIN DESCRIPTION - LOCATION
LOCATION: LEG
LOCATION: LEG

## 2025-03-15 NOTE — CARE PLAN
The patient's goals for the shift include Pain management, speak to doctor, rest    The clinical goals for the shift include Pain management, speak to doctor, IV antibotics, rest      Problem: Pain - Adult  Goal: Verbalizes/displays adequate comfort level or baseline comfort level  Outcome: Progressing     Problem: Safety - Adult  Goal: Free from fall injury  Outcome: Progressing     Problem: Pain  Goal: Takes deep breaths with improved pain control throughout the shift  Outcome: Progressing  Goal: Turns in bed with improved pain control throughout the shift  Outcome: Progressing  Goal: Walks with improved pain control throughout the shift  Outcome: Progressing  Goal: Performs ADL's with improved pain control throughout shift  Outcome: Progressing  Goal: Participates in PT with improved pain control throughout the shift  Outcome: Progressing  Goal: Free from opioid side effects throughout the shift  Outcome: Progressing  Goal: Free from acute confusion related to pain meds throughout the shift  Outcome: Progressing

## 2025-03-15 NOTE — PROGRESS NOTES
Vancomycin Dosing by Pharmacy- Cessation of Therapy    Consult to pharmacy for vancomycin dosing has been discontinued by the prescriber, pharmacy will sign off at this time.    Please call pharmacy if there are further questions or re-enter a consult if vancomycin is resumed.     Martín Farrell, [facility]

## 2025-03-15 NOTE — CONSULTS
Vancomycin Dosing by Pharmacy- INITIAL    Rome Mullins is a 64 y.o. year old male who Pharmacy has been consulted for vancomycin dosing for cellulitis, skin and soft tissue. Based on the patient's indication and renal status this patient will be dosed based on a goal AUC of 400-600.     Renal function is currently stable.    Visit Vitals  /70 (BP Location: Left arm, Patient Position: Sitting)   Pulse 68   Temp 36.9 °C (98.4 °F) (Tympanic)   Resp 16        Lab Results   Component Value Date    CREATININE 0.92 2025    CREATININE 0.84 07/10/2024    CREATININE 1.07 2024    CREATININE 0.94 2024        Patient weight is as follows:   Vitals:    25   Weight: 129 kg (284 lb 2.8 oz)       Cultures:  No results found for the encounter in last 14 days.        No intake/output data recorded.  I/O during current shift:  I/O this shift:  In: 50 [IV Piggyback:50]  Out: -     Temp (24hrs), Av.9 °C (98.4 °F), Min:36.9 °C (98.4 °F), Max:36.9 °C (98.4 °F)         Assessment/Plan     Patient is receiving a one time dose of 2000 mg at ED today.  Will initiate vancomycin maintenance,  1500 mg every 12 hours.    This dosing regimen is predicted by [employer] to result in the following pharmacokinetic parameters:    Regimen: 1500 mg IV every 12 hours.  Start time: 10:09 on 03/15/2025  Exposure target: AUC24 (range)400-600 mg/L.hr   [id number]: 489 mg/L.hr  AUC24,ss: 547 mg/L.hr  Probability of AUC24 > 400: 81 %  Ctrough,ss: 17.8 mg/L  Probability of Ctrough,ss > 20: 40 %    Follow-up level will be ordered on 3/15 at 0500 unless clinically indicated sooner.  Will continue to monitor renal function daily while on vancomycin and order serum creatinine at least every 48 hours if not already ordered.  Follow for continued vancomycin needs, clinical response, and signs/symptoms of toxicity.       Cordell Rangel, PharmD

## 2025-03-15 NOTE — DISCHARGE SUMMARY
Discharge Diagnosis  Chronic venous stasis, Baker's cyst left popliteal fossa    Issues Requiring Follow-Up  General Primary care    Discharge Meds     Medication List      CONTINUE taking these medications     acetaminophen 500 mg tablet; Commonly known as: Tylenol   albuterol 90 mcg/actuation inhaler; Inhale 1-2 puffs every 6 hours if   needed for wheezing or shortness of breath.   apixaban 5 mg tablet; Commonly known as: Eliquis; Take 1 tablet (5 mg)   by mouth 2 times a day.   atorvastatin 10 mg tablet; Commonly known as: Lipitor; Take 1 tablet (10   mg) by mouth once daily.   furosemide 20 mg tablet; Commonly known as: Lasix; Take 1 tablet (20 mg)   by mouth once daily for 7 days.   gabapentin 600 mg tablet; Commonly known as: Neurontin; Take 1 tablet   (600 mg) by mouth 2 times a day.   hydrOXYzine HCL 25 mg tablet; Commonly known as: Atarax; Take 1 tablet   (25 mg) by mouth once daily at bedtime.   lisinopril 10 mg tablet; TAKE ONE TABLET BY MOUTH EVERY DAY FOR 30 DAYS.   metFORMIN 500 mg tablet; Commonly known as: Glucophage; Take 1 tablet   (500 mg) by mouth 2 times a day.   metoprolol tartrate 25 mg tablet; Commonly known as: Lopressor; Take 1   tablet (25 mg) by mouth 2 times a day.   omeprazole 40 mg DR capsule; Commonly known as: PriLOSEC   sertraline 100 mg tablet; Commonly known as: Zoloft; TAKE ONE TABLET BY   MOUTH ONCE EVERY DAY   tadalafil 5 mg tablet; Commonly known as: Cialis; Take 1 tablet (5 mg)   by mouth once daily.     STOP taking these medications     doxycycline 100 mg capsule; Commonly known as: Vibramycin       Test Results Pending At Discharge  Pending Labs       Order Current Status    Blood Culture In process    Blood Culture In process            Hospital Course   Patient was admitted to the hospital for what was thought to be left leg cellulitis.  Apparently he had outpatient treatment for this and there was thought to be a failure of treatment.  Patient presents emergency  department with left lower extremity edema which is chronic for him.  Patient with history of chronic venous stasis of bilateral extremities.  Reviewed pictures from vascular evaluation some months ago left and right legs look the same with darkened areas consistent with chronic venous stasis.  There is no erythema or any signs of infection both clinically or from a lab standpoint.  Patient did have lower extremity Dopplers of the left which did show a small Baker's cyst.  This would explain some of his lower extremity swelling.  Patient has been afebrile patient states that he does have chronic left lower extremity swelling and this is intermittent and is he is supposed to wear LISA hoses at home.  In any case I do not feel that there is an infection I think this is chronic venous stasis with vascular insufficiency of the left lower extremity specifically.  I suggested wearing ILSA hoses.  I will discharge patient home in stable condition.    Pertinent Physical Exam At Time of Discharge  Physical Exam  Generally no acute distress  HEENT PERRL EOMI  Cardiovascular S1-S2 regular rate rhythm  Lungs clear to auscultation  Extremities bilateral chronic venous stasis left lower extremity edema greater than right  Outpatient Follow-Up  Future Appointments   Date Time Provider Department Center   5/28/2025  1:40 PM Wu Pantoja MD [id number] [facility]   6/2/2025  1:30 PM Janna Epstein MD [id number] [facility]   7/2/2025 12:30 PM Ginny Thomas PA-C [id number] [facility]   8/12/2025  1:00 PM Chelle Melendez DPM [id number] [facility]   10/1/2025  4:00 PM Drake Rivas MD [id number] [facility]   Total time spent on discharge 45 minutes      Jens Rain MD

## 2025-03-15 NOTE — CONSULTS
Vancomycin Dosing by Pharmacy- EMERGENCY DEPARTMENT    Rome Mullins is a 64 y.o. year old male who Pharmacy has been consulted to give a ONE TIME ONLY vancomycin dose in the Emergency Department for cellulitis, skin and soft tissue.     Visit Vitals  /71 (BP Location: Right arm, Patient Position: Sitting)   Pulse 62   Temp 36.9 °C (98.4 °F) (Tympanic)   Resp 18      Lab Results   Component Value Date    CREATININE 0.92 03/14/2025    CREATININE 0.84 07/10/2024    CREATININE 1.07 07/08/2024    CREATININE 0.94 07/08/2024      Wt Readings from Last 1 Encounters:   03/14/25 129 kg (284 lb 2.8 oz)        Assessment/Plan     Patient will be given a one time dose of 2000 mg  Pharmacy will sign off at this time. If vancomycin is to be continued, please re-consult Pharmacy.       Please contact Pharmacy with any questions.  Cordell Rangel, PharmD

## 2025-03-15 NOTE — ASSESSMENT & PLAN NOTE
-failed outpt tx with keflex/doxy  -continuing Rocephin for now, however LLE on exam does not appear compelling for infection

## 2025-03-15 NOTE — CARE PLAN
Problem: Pain - Adult  Goal: Verbalizes/displays adequate comfort level or baseline comfort level  Outcome: Progressing     Problem: Safety - Adult  Goal: Free from fall injury  Outcome: Progressing     Problem: Discharge Planning  Goal: Discharge to home or other facility with appropriate resources  Outcome: Progressing     Problem: Chronic Conditions and Co-morbidities  Goal: Patient's chronic conditions and co-morbidity symptoms are monitored and maintained or improved  Outcome: Progressing   The patient's goals for the shift include Pain management, speak to doctor, rest    The clinical goals for the shift include Pain management, speak to doctor, IV antibotics, rest      03/15/25 at 6:49 AM - Kalpana Shafer RN

## 2025-03-15 NOTE — PROGRESS NOTES
Vancomycin Dosing by Pharmacy- FOLLOW UP    Rome Mullins is a 64 y.o. year old male who Pharmacy has been consulted for vancomycin dosing for cellulitis, skin and soft tissue. Based on the patient's indication and renal status this patient is being dosed based on a goal AUC of 400-600.     Renal function is currently stable.    Current vancomycin dose: 1500 mg given every 12 hours    Estimated vancomycin AUC on current dose: 673 mg/L.hr     Visit Vitals  /60 (BP Location: Right arm, Patient Position: Sitting)   Pulse 68   Temp 36.6 °C (97.9 °F) (Temporal)   Resp 16        Lab Results   Component Value Date    CREATININE 0.93 03/15/2025    CREATININE 0.92 2025    CREATININE 0.84 07/10/2024    CREATININE 1.07 2024        Patient weight is as follows:   Vitals:    25   Weight: 129 kg (284 lb 2.8 oz)       Cultures:  No results found for the encounter in last 14 days.       I/O last 3 completed shifts:  In: 1050 (8.1 mL/kg) [P.O.:600; IV Piggyback:450]  Out: - (0 mL/kg)   Weight: 128.9 kg   I/O during current shift:  No intake/output data recorded.    Temp (24hrs), Av.8 °C (98.2 °F), Min:36.6 °C (97.9 °F), Max:36.9 °C (98.4 °F)      Assessment/Plan    Above goal AUC. Orders placed for new vancomcyin regimen of 1000 every 12 hours to begin at 3/15/25 @ 1000 hrs.    This dosing regimen is predicted by [employer] to result in the following pharmacokinetic parameters:    Loading dose: N/A  Regimen: 1000 mg IV every 12 hours.  Start time: 10:09 on 03/15/2025  Exposure target: AUC24 (range)400-600 mg/L.hr   [id number]: 439 mg/L.hr  AUC24,ss: 451 mg/L.hr  Probability of AUC24 > 400: 72 %  Ctrough,ss: 13.5 mg/L  Probability of Ctrough,ss > 20: 8 %    The next level will be obtained on 3/16/25 at AM labs. May be obtained sooner if clinically indicated.   Will continue to monitor renal function daily while on vancomycin and order serum creatinine at least every 48 hours if not already  ordered.  Follow for continued vancomycin needs, clinical response, and signs/symptoms of toxicity.       Martín Farrell, [facility]

## 2025-03-15 NOTE — ASSESSMENT & PLAN NOTE
-hx of prior blood clot, edema potentially more from chronic venous stasis  -trial IV lasix, elevate extremity

## 2025-03-15 NOTE — H&P
History of Present Illness  Rome Mullins is a 64 y.o. male who presented with Leg Swelling (Pt reports left lower leg swelling that he was seen for last week. Diagnosed with cellulitis. Pt states his leg is swelling worse than before and came back to be reevaluated. ) and is admitted for Cellulitis of left lower extremity    Subjective    He reports a little over 2 weeks of LLE edema, pain and warmth to the touch. He reports a hx of blood clot in that leg and has been on Eliquis since. No missed doses. He presented to the ER about a week ago and was diagnosed with cellulitis. He denies fevers or chills. He was taking the prescribed antibiotics and lasix and reports it feels like his leg has only gotten worse. Denies any shortness of breath or chest pain    Review of Systems   Cardiovascular:  Positive for leg swelling.         History    Rome Mullins  has a past medical history of Allergic, Anxiety, Arthritis (09/01/2023), Clotting disorder (Multi), Depression, Difficulty walking (2003), HL (hearing loss) (05/01/2023), Hypertension (2000?), Pulmonary embolism, and Urinary tract infection (01/01/2015).    He has no past medical history of Adverse effect of anesthesia, Awareness under anesthesia, Delayed emergence from general anesthesia, Hard to intubate, Malignant hyperthermia, PONV (postoperative nausea and vomiting), Pseudocholinesterase deficiency, or Spinal headache.     Past Surgical History:   Procedure Laterality Date    BACK SURGERY  01/01/2002    CHOLECYSTECTOMY  01/01/2008    HERNIA REPAIR  01/01/2011    JOINT REPLACEMENT  01/01/2010    SPINE SURGERY  01/01/2002    TOENAIL EXCISION  2019    TONSILLECTOMY  01/01/1971    WISDOM TOOTH EXTRACTION  01/01/1998      Patient  reports that he quit smoking about 36 years ago. His smoking use included cigarettes and cigars. He started smoking about 50 years ago. He has a 14.7 pack-year smoking history. He has never been exposed to tobacco smoke. He has quit  "using smokeless tobacco. He reports current alcohol use of about 1.0 standard drink of alcohol per week. He reports that he does not use drugs.     Patient's family history includes Cancer in his father and another family member; Diabetes in his father, mother, and another family member; Heart disease in an other family member; Hypertension in his father, mother, and another family member; Mental illness in his mother; No Known Problems in his daughter, sister, and son; Peripheral vascular disease in his father; Stroke in an other family member.          Objective    Blood pressure 118/60, pulse 68, temperature 36.6 °C (97.9 °F), temperature source Temporal, resp. rate 16, height 1.854 m (6' 1\"), weight 129 kg (284 lb 2.8 oz), SpO2 96%.  Vitals Reviewed: yes  Constitutional: awake and alert, sitting up at side of bed  Eyes: EOMI, normal conjunctiva  HENT: moist mucus membranes, airway patent  Pulm: no wheezes, no rhonchi, no crackles, no coarse breath sounds  Cardiac: reg rate, regular rhythm, no murmur, equal pulses  MSK: 1/2+ LLE, no RLE edema  Skin: venous stasis changes of bilat lower extremity, mild warmth to LLE. Dorsal, medial foot with small amount of redness, however no definitive signs of cellulitis  Neuro: No focal deficit, oriented, CN II-XII grossly intact  Psych: Cooperative, appropriate affect, appropriate judgement      Intake/Output Summary (Last 24 hours) at 3/14/2025 2320  Last data filed at 3/14/2025 2209  Gross per 24 hour   Intake 50 ml   Output --   Net 50 ml       Relevant Results  Lower extremity venous duplex left  Result Date: 3/14/2025  Negative study.  No deep venous thrombosis of the  left lower extremity.   There is a small subcutaneous irregular fluid collection in the popliteal fossa with some debris, also present previously, suspected to represent small Baker's cyst.   Diffuse subcutaneous edema.      Scheduled medications  apixaban, 5 mg, oral, BID  [START ON 3/15/2025] " atorvastatin, 10 mg, oral, Daily  [START ON 3/15/2025] cefTRIAXone, 1 g, intravenous, q24h  [START ON 3/15/2025] furosemide, 40 mg, intravenous, Daily  gabapentin, 600 mg, oral, BID  [START ON 3/15/2025] insulin lispro, 0-5 Units, subcutaneous, TID AC  metoprolol tartrate, 25 mg, oral, BID  [START ON 3/15/2025] pantoprazole, 40 mg, oral, Daily before breakfast  [START ON 3/15/2025] sertraline, 100 mg, oral, Daily  [START ON 3/15/2025] vancomycin, 1,500 mg, intravenous, q12h  vancomycin, 2,000 mg, intravenous, Once      Continuous medications     PRN medications  PRN medications: acetaminophen, dextrose, dextrose, glucagon, ipratropium-albuteroL, melatonin, ondansetron ODT **OR** ondansetron, oxyCODONE-acetaminophen, vancomycin        Assessment    Assessment & Plan  Cellulitis of left lower extremity  -failed outpt tx with keflex/doxy  -continuing Rocephin for now, however LLE on exam does not appear compelling for infection  Venous stasis of lower extremity  -hx of prior blood clot, edema potentially more from chronic venous stasis  -trial IV lasix, elevate extremity  History of pulmonary embolism  -Continue Eliquis  Paroxysmal A-fib (Multi)  -continue metoprolol and Eliquis  Type 2 diabetes mellitus with peripheral neuropathy  -hold metformin, continue gabapentin  -SSI    DVT prophylaxis: Eliquis  Disposition: Inpatient for now      Manju Connor

## 2025-03-16 LAB
BACTERIA BLD CULT: NORMAL
BACTERIA BLD CULT: NORMAL

## 2025-03-19 LAB
BACTERIA BLD CULT: NORMAL
BACTERIA BLD CULT: NORMAL

## 2025-03-20 ENCOUNTER — PATIENT MESSAGE (OUTPATIENT)
Dept: PRIMARY CARE | Facility: CLINIC | Age: 65
End: 2025-03-20
Payer: COMMERCIAL

## 2025-03-20 DIAGNOSIS — K21.9 GASTROESOPHAGEAL REFLUX DISEASE WITHOUT ESOPHAGITIS: Primary | ICD-10-CM

## 2025-03-20 DIAGNOSIS — M54.50 LOW BACK PAIN WITH RADIATION: ICD-10-CM

## 2025-03-21 RX ORDER — OMEPRAZOLE 40 MG/1
40 CAPSULE, DELAYED RELEASE ORAL DAILY
Qty: 180 CAPSULE | Refills: 0 | Status: SHIPPED | OUTPATIENT
Start: 2025-03-21

## 2025-03-21 RX ORDER — GABAPENTIN 600 MG/1
600 TABLET ORAL 2 TIMES DAILY
Qty: 180 TABLET | Refills: 1 | Status: SHIPPED | OUTPATIENT
Start: 2025-03-21

## 2025-03-22 ENCOUNTER — TELEPHONE (OUTPATIENT)
Dept: INPATIENT UNIT | Facility: HOSPITAL | Age: 65
End: 2025-03-22
Payer: COMMERCIAL

## 2025-04-01 ENCOUNTER — TELEPHONE (OUTPATIENT)
Dept: URGENT CARE | Age: 65
End: 2025-04-01

## 2025-04-01 ENCOUNTER — OFFICE VISIT (OUTPATIENT)
Dept: URGENT CARE | Age: 65
End: 2025-04-01
Payer: COMMERCIAL

## 2025-04-01 VITALS
WEIGHT: 284 LBS | DIASTOLIC BLOOD PRESSURE: 65 MMHG | HEIGHT: 73 IN | HEART RATE: 59 BPM | TEMPERATURE: 97.8 F | OXYGEN SATURATION: 94 % | SYSTOLIC BLOOD PRESSURE: 130 MMHG | BODY MASS INDEX: 37.64 KG/M2 | RESPIRATION RATE: 16 BRPM

## 2025-04-01 DIAGNOSIS — H65.191 OTHER NON-RECURRENT ACUTE NONSUPPURATIVE OTITIS MEDIA OF RIGHT EAR: ICD-10-CM

## 2025-04-01 DIAGNOSIS — J01.00 ACUTE NON-RECURRENT MAXILLARY SINUSITIS: Primary | ICD-10-CM

## 2025-04-01 PROCEDURE — 99213 OFFICE O/P EST LOW 20 MIN: CPT

## 2025-04-01 PROCEDURE — 3075F SYST BP GE 130 - 139MM HG: CPT

## 2025-04-01 PROCEDURE — 3078F DIAST BP <80 MM HG: CPT

## 2025-04-01 PROCEDURE — 3008F BODY MASS INDEX DOCD: CPT

## 2025-04-01 PROCEDURE — 4010F ACE/ARB THERAPY RXD/TAKEN: CPT

## 2025-04-01 RX ORDER — AMOXICILLIN AND CLAVULANATE POTASSIUM 875; 125 MG/1; MG/1
875 TABLET, FILM COATED ORAL 2 TIMES DAILY
Qty: 14 TABLET | Refills: 0 | Status: SHIPPED | OUTPATIENT
Start: 2025-04-01 | End: 2025-04-01

## 2025-04-01 ASSESSMENT — ENCOUNTER SYMPTOMS
SHORTNESS OF BREATH: 0
FEVER: 0
CHILLS: 0
STRIDOR: 0
CHEST TIGHTNESS: 0
DIZZINESS: 0
SORE THROAT: 0
EYE ITCHING: 0
WHEEZING: 0
EYE DISCHARGE: 0
VOMITING: 0
ABDOMINAL PAIN: 0
FATIGUE: 0
DIARRHEA: 0
COUGH: 1

## 2025-04-01 NOTE — PATIENT INSTRUCTIONS
Discharge instructions    Please follow up with your Primary Care Physician within the next 5-7 days.    It is important to take prescriptions as prescribed and complete all antibiotics.     If your symptoms worsen you are instructed to immediately go to the emergency room for reevaluation and further assessment.    If you develop any chest pain, SOB, or difficulty breathing you are instructed to go to the emergency room for reevaluation.    All discharge instructions will be provided and explained to the patient at discharge.    If you have any questions regarding your treatment plan please call the [facility] urgent care clinic.

## 2025-04-01 NOTE — TELEPHONE ENCOUNTER
Pt was seen 4/1/2025. [facility] in [place] called, wanting to change Pt's prescription to Amoxicillin. Pharmacy stated Pt has an allergy to a component in Augmentin. Pharmacy callback number is [phone]. LOUISE PSR 4/1/2025

## 2025-04-01 NOTE — PROGRESS NOTES
"Subjective   Patient ID: Rome Mullins \"Yarely" is a 64 y.o. male. They present today with a chief complaint of Cough, Nasal Congestion (X1.5 weeks), Earache (Right ear ), and Headache.    History of Present Illness  Patient is 64-year-old male presenting to the clinic with complaints of cough, nasal congestion, earache, chest congestion.  Symptoms in totality about 1.5 weeks per patient interview.  Cough productive of phlegm likely secondary to postnasal drip with associated nasal congestion and sinus pressure patient states he also has earache over the right side no ear drainage no chest pain shortness of breath or difficulty breathing.      History provided by:  Patient      Past Medical History  Allergies as of 04/01/2025 - Reviewed 04/01/2025   Allergen Reaction Noted    Fentanyl Palpitations and Unknown 04/02/2007    Duloxetine GI Upset 09/09/2023    Venlafaxine GI Upset 09/09/2023    Methadone Palpitations 09/09/2023       (Not in a hospital admission)       Past Medical History:   Diagnosis Date    Allergic     Anxiety     Arthritis 09/01/2023    Clotting disorder (Multi)     Depression     Difficulty walking 2003    HL (hearing loss) 05/01/2023    Hypertension 2000?    VICENTE (obstructive sleep apnea)     Pulmonary embolism     Urinary tract infection 01/01/2015       Past Surgical History:   Procedure Laterality Date    BACK SURGERY  01/01/2002    CHOLECYSTECTOMY  01/01/2008    HERNIA REPAIR  01/01/2011    JOINT REPLACEMENT  01/01/2010    SPINE SURGERY  01/01/2002    TOENAIL EXCISION  2019    TONSILLECTOMY  01/01/1971    WISDOM TOOTH EXTRACTION  01/01/1998        reports that he quit smoking about 36 years ago. His smoking use included cigarettes and cigars. He started smoking about 50 years ago. He has a 14.7 pack-year smoking history. He has never been exposed to tobacco smoke. He has quit using smokeless tobacco. He reports current alcohol use of about 1.0 standard drink of alcohol per week. He reports " "that he does not use drugs.    Review of Systems  Review of Systems   Constitutional:  Negative for chills, fatigue and fever.   HENT:  Positive for congestion, ear pain, postnasal drip and sinus pressure. Negative for ear discharge and sore throat.    Eyes:  Negative for discharge and itching.   Respiratory:  Positive for cough. Negative for chest tightness, shortness of breath, wheezing and stridor.    Cardiovascular:  Negative for chest pain.   Gastrointestinal:  Negative for abdominal pain, diarrhea and vomiting.   Neurological:  Negative for dizziness.                                  Objective    Vitals:    04/01/25 1803 04/01/25 1840   BP: 130/65    BP Location: Left arm    Patient Position: Sitting    BP Cuff Size: Adult    Pulse: 59    Resp: 16    Temp: 36.6 °C (97.8 °F)    TempSrc: Oral    SpO2: 93% 94%   Weight: 129 kg (284 lb)    Height: 1.854 m (6' 1\")      No LMP for male patient.    Physical Exam  Vitals reviewed.   Constitutional:       General: He is awake. He is not in acute distress.     Appearance: Normal appearance. He is well-developed. He is not ill-appearing or toxic-appearing.   HENT:      Head: Normocephalic and atraumatic.      Right Ear: Ear canal and external ear normal.      Left Ear: Tympanic membrane, ear canal and external ear normal.      Ears:      Comments: Right-sided tympanic membrane with erythema, bulging no serous fluid TM intact no signs of mastoiditis     Nose: Congestion present.      Mouth/Throat:      Mouth: Mucous membranes are moist.      Pharynx: Oropharynx is clear. Uvula midline. No oropharyngeal exudate or posterior oropharyngeal erythema.      Tonsils: No tonsillar exudate or tonsillar abscesses.   Cardiovascular:      Rate and Rhythm: Normal rate and regular rhythm.      Heart sounds: Normal heart sounds, S1 normal and S2 normal. No murmur heard.     No friction rub. No gallop.   Pulmonary:      Effort: Pulmonary effort is normal. No respiratory distress.      " Breath sounds: Normal breath sounds. No stridor. No wheezing, rhonchi or rales.   Skin:     General: Skin is warm.   Neurological:      General: No focal deficit present.      Mental Status: He is alert and oriented to person, place, and time. Mental status is at baseline.      Gait: Gait is intact.   Psychiatric:         Mood and Affect: Mood normal.         Behavior: Behavior normal. Behavior is cooperative.         Procedures    Point of Care Test & Imaging Results from this visit  No results found for this visit on 04/01/25.   Imaging  No results found.    Cardiology, Vascular, and Other Imaging  No other imaging results found for the past 2 days      Diagnostic study results (if any) were reviewed by [facility].    Assessment/Plan   Allergies, medications, history, and pertinent labs/EKGs/Imaging reviewed by Brett Jiang PA-C.     Medical Decision Making:    Patient is 64-year-old male presenting to the clinic with complaints of cough, nasal congestion, earache, chest congestion.  Symptoms in totality about 1.5 weeks per patient interview.  Cough productive of phlegm likely secondary to postnasal drip with associated nasal congestion and sinus pressure patient states he also has earache over the right side no ear drainage no chest pain shortness of breath or difficulty breathing.  Vital signs in the clinic are stable within normal limits.  Physical examination as above patient with right-sided otitis media.  Likely secondary to sinusitis.  Physical exam otherwise negative.  Will be covering patient with Augmentin with follow-up to PCP. Discharge instructions. Please follow up with your Primary Care Physician within the next 5-7 days. It is important to take prescriptions as prescribed and complete all antibiotics.  If your symptoms worsen you are instructed to immediately go to the emergency room for reevaluation and further assessment. If you develop any chest pain, SOB, or difficulty breathing  you are instructed to go to the emergency room for reevaluation. All discharge instructions will be provided and explained to the patient at discharge. If you have any questions regarding your treatment plan please call the [facility] urgent care clinic.     Pharmacy called stating on their end patient has an allergy to clavulanate.  Given this I did a verbal change from Augmentin to amoxicillin 875 twice daily 7 days continue plan of care as above.    Orders and Diagnoses  Diagnoses and all orders for this visit:  Acute non-recurrent maxillary sinusitis  Non-recurrent acute serous otitis media of right ear      Medical Admin Record      Patient disposition: Home    Electronically signed by [facility]  8:12 AM

## 2025-04-02 ASSESSMENT — ENCOUNTER SYMPTOMS: SINUS PRESSURE: 1

## 2025-04-09 ENCOUNTER — TELEPHONE (OUTPATIENT)
Dept: UROLOGY | Facility: CLINIC | Age: 65
End: 2025-04-09
Payer: COMMERCIAL

## 2025-04-09 NOTE — TELEPHONE ENCOUNTER
Attempted to contact patient regarding rescheduling his FUV on 05/28/25 with Dr. Pantoja due to provider being unavailable that day. Left office phone number and business hours. Asked patient to call us back to reschedule.

## 2025-04-13 NOTE — PROGRESS NOTES
"Subjective   HPI   Rome \"Tate\" is a 64 y.o. year old male patient with presenting to clinic with concern for follow up after hospitalization 3/14/-3/15 (past TCM window)   No chief complaint on file.      Hospitalized for cellulitis bilat LE, chronic venous stsis bilat LE, L popliteal baker's cyst.   Paroxysmal Afib, DM2 w peripheral neuropathy, Hx PE  Was initially treated in ER with doxy and lasix. In hospital, previous photos from vascular eval vs during hospitals showed chronic venous stasis. Hx LLE DVT in the past. Chronic LLE edema with LLE baker's cyst shown on US with vascular insuficiency.   Needs to wear LISA hose at home.    HTN  -lisinopril 10  -metoprolol tartrate 25mg bid (and PAF)  BP Readings from Last 5 Encounters:   04/01/25 130/65   03/15/25 140/82   03/08/25 135/74   02/05/25 112/64   01/06/25 149/75     HLD  -atorvastatin 10  Lab Results   Component Value Date    LDLCALC 128 (H) 03/21/2024     Afib  -metoprolol  -Eliquis     Hx PE & LLE DVT  - Eliquis    Venous stasis bilat LE  -LISA hose  -elevated LE    DM  -metformin 500bid  Lab Results   Component Value Date    HGBA1C 6.6 (H) 11/19/2024    HGBA1C 6.8 (A) 10/14/2024    HGBA1C 6.8 (H) 07/09/2024     Lab Results   Component Value Date    LDLCALC 128 (H) 03/21/2024    CREATININE 0.93 03/15/2025     Diabetes Composite Score: 5   Values used to calculate this score:    Points  Metrics       1        Blood Pressure: 130/65       1        Prescribed Statins: Yes       1        Hemoglobin A1c: 6.6%       1        Smokes Tobacco: No       1        Prescribed Aspirin: Yes  Optho 10/2024  Neuropathy  -gabapentin    GERD  -omeprazole 40     Anxiety  -zoloft 100     PE 2002  DVT LLE and PE following back surgery  IVC filter      VICENTE   Compliant with CPAP d/t claustrophoia with 2lpm       Home O2 at bedtime only  (since Covid illness)      Pt concerned for limb movements and jerking while asleep and falling asleep. As of 7/12/2022, no RLS noted during " sleep study.      High PSA (2.8) & ED  Dr [facility] (elevated PSA 2/2 UTI)  -Cialis 5     Severe COVID 2021 with 1 month hospitalization (apparently I was the one that sent him to ER for admission which is why he came here)  Dr Mckeon  -albuterol PRN  PNA 7/2024     Chronic low back pain  Postlaminectomy syndrome, DDD lumbar, Fibromyalgia  and neuropathy  -gabapentin 600 bid      Arthritis  [facility] arthritis  Dr Silviano Fields hip TKR   Dr Miranda-Leo     Former smoker  1 ppd 15 yr [id number]          Lab Results   Component Value Date    TSH 2.94 04/20/2023         Preventative Health   Labs  Brody [unfilled]  PAP  DEXA   CRC   LDCT  There are no preventive care reminders to display for this patient.      Problem List Reviewed  Patient Active Problem List   Diagnosis    Agoraphobia    Anxiety    Degeneration of lumbar intervertebral disc    Erectile dysfunction    Fibromyalgia    Gastroesophageal reflux disease    History of pulmonary embolism    Hypertension    Mixed hyperlipidemia    Low back pain with radiation    Obstructive sleep apnea syndrome    Osteoarthritis    Paroxysmal A-fib (Multi)    Postlaminectomy syndrome of lumbosacral region    Sleep related hypoxia    Type 2 diabetes mellitus with peripheral neuropathy    ASCVD (arteriosclerotic cardiovascular disease)    Cellulitis of left lower limb    Cellulitis of left lower extremity    Venous stasis of lower extremity       Past Medical History:   Diagnosis Date    Allergic     Anxiety     Arthritis 09/01/2023    Clotting disorder (Multi)     Depression     Difficulty walking 2003    HL (hearing loss) 05/01/2023    Hypertension 2000?    VICENTE (obstructive sleep apnea)     Pulmonary embolism     Urinary tract infection 01/01/2015      Past Surgical History:   Procedure Laterality Date    BACK SURGERY  01/01/2002    CHOLECYSTECTOMY  01/01/2008    HERNIA REPAIR  01/01/2011    JOINT REPLACEMENT  01/01/2010    SPINE SURGERY  01/01/2002    TOENAIL  EXCISION  2019    TONSILLECTOMY  1971    WISDOM TOOTH EXTRACTION  1998      Family History   Problem Relation Name Age of Onset    Mental illness Mother Chey     Hypertension Mother Chey     Diabetes Mother Chey     Peripheral vascular disease Father Sd     Diabetes Father Sd     Hypertension Father Sd     Cancer Father Sd     No Known Problems Sister      No Known Problems Daughter      No Known Problems Son      Diabetes Other fam hx     Heart disease Other fam hx     Stroke Other fam hx     Hypertension Other fam hx     Cancer Other fam hx       Social History     Tobacco Use    Smoking status: Former     Current packs/day: 0.00     Average packs/day: 1 pack/day for 14.7 years (14.7 ttl pk-yrs)     Types: Cigarettes, Cigars     Start date: 1974     Quit date: 1989     Years since quittin.2     Passive exposure: Never    Smokeless tobacco: Former   Substance Use Topics    Alcohol use: Yes     Alcohol/week: 1.0 standard drink of alcohol     Types: 1 Cans of beer per week     Comment: Sometimes 3        Medications Reviewed  Current Outpatient Medications on File Prior to Visit   Medication Sig Dispense Refill    acetaminophen (Tylenol) 500 mg tablet Take 2 tablets (1,000 mg) by mouth every 8 hours if needed.      albuterol 90 mcg/actuation inhaler Inhale 1-2 puffs every 6 hours if needed for wheezing or shortness of breath. 18 g 1    apixaban (Eliquis) 5 mg tablet Take 1 tablet (5 mg) by mouth 2 times a day. 180 tablet 1    atorvastatin (Lipitor) 10 mg tablet Take 1 tablet (10 mg) by mouth once daily. 90 tablet 3    furosemide (Lasix) 20 mg tablet Take 1 tablet (20 mg) by mouth once daily for 7 days. 7 tablet 0    gabapentin (Neurontin) 600 mg tablet Take 1 tablet (600 mg) by mouth 2 times a day. 180 tablet 1    hydrOXYzine HCL (Atarax) 25 mg tablet Take 1 tablet (25 mg) by mouth once daily at bedtime. 90 tablet 0    lisinopril 10 mg tablet TAKE ONE TABLET BY MOUTH EVERY DAY FOR 30  DAYS. 90 tablet 3    metFORMIN (Glucophage) 500 mg tablet Take 1 tablet (500 mg) by mouth 2 times a day. 180 tablet 3    metoprolol tartrate (Lopressor) 25 mg tablet Take 1 tablet (25 mg) by mouth 2 times a day. 180 tablet 3    omeprazole (PriLOSEC) 40 mg DR capsule Take 1 capsule (40 mg) by mouth once daily. 180 capsule 0    sertraline (Zoloft) 100 mg tablet TAKE ONE TABLET BY MOUTH ONCE EVERY DAY 90 tablet 3    tadalafil (Cialis) 5 mg tablet Take 1 tablet (5 mg) by mouth once daily. 30 tablet 11     No current facility-administered medications on file prior to visit.        Review of Systems  Constitutional: Denies fever  HEENT: Denies ST, earache  CVS: Denies Chest pain  Pulmonary: Denies wheezing, SOB  GI: Denies N/V  : Denies dysuria  Musculoskeletal:  Denies myalgia  Neuro: Denies focal weakness or numbness.  Skin: Denies Rashes.  *Review of Systems is negative unless otherwise mentioned in HPI or ROS above.      @OBJECTIVEBEGIN  There were no vitals taken for this visit. reviewed There is no height or weight on file to calculate BMI.     Physical Exam  Constitutional: NAD. Afebrile. Resting comfortably.  ENT: Nasal mucosa and oropharynx: moist oral mucosa. Posterior oropharynx nonerythematous. No posterior pharyngeal streaking.  TM: Bilat TM nonerythematous, pearly grey, landmarks intact. EAC wnl bilat.  Eyes: PERRLA. EOM intact.   Lymph: No anterior cervical chain or submandibular lymphadenopathy. No sentinel lymph nodes.  Cardiac: Regular rate & rhythm. No murmur, gallops, or rubs.  Pulmonary: Lungs clear to auscultation bilaterally with good aeration. No wheezes, rhonchi, or rales.   GI: Soft, Nontender, nondistended. No guarding. Normal BS x4.  : No suprapubic tenderness. No CVA tenderness to percussion.   Musculoskeletal: No peripheral edema.   Skin: No evidence of trauma. No rashes  Neuro: No focal neuro deficits. Normal gait without assistive devices.  Psych: Intact judgement and  "insight.      MDM  Discussed in detail. Rome \"Tate\" is in agreement        .Assessment/Plan   {Assess/PlanSmartLinks:51070}      "

## 2025-04-14 ENCOUNTER — HOSPITAL ENCOUNTER (OUTPATIENT)
Dept: RADIOLOGY | Facility: HOSPITAL | Age: 65
Discharge: HOME | End: 2025-04-14
Payer: COMMERCIAL

## 2025-04-14 ENCOUNTER — APPOINTMENT (OUTPATIENT)
Dept: PRIMARY CARE | Facility: CLINIC | Age: 65
End: 2025-04-14
Payer: COMMERCIAL

## 2025-04-14 VITALS
BODY MASS INDEX: 39.44 KG/M2 | OXYGEN SATURATION: 95 % | HEIGHT: 73 IN | HEART RATE: 67 BPM | WEIGHT: 297.6 LBS | SYSTOLIC BLOOD PRESSURE: 134 MMHG | TEMPERATURE: 96.8 F | DIASTOLIC BLOOD PRESSURE: 60 MMHG

## 2025-04-14 DIAGNOSIS — L99 DERMATOSIS DUE TO VASCULAR INSUFFICIENCY: Primary | ICD-10-CM

## 2025-04-14 DIAGNOSIS — G89.29 CHRONIC PAIN OF LEFT KNEE: ICD-10-CM

## 2025-04-14 DIAGNOSIS — M25.562 CHRONIC PAIN OF LEFT KNEE: ICD-10-CM

## 2025-04-14 DIAGNOSIS — I99.8 DERMATOSIS DUE TO VASCULAR INSUFFICIENCY: Primary | ICD-10-CM

## 2025-04-14 DIAGNOSIS — R60.0 EDEMA OF LEFT LOWER LEG: ICD-10-CM

## 2025-04-14 DIAGNOSIS — M71.22 BAKER CYST, LEFT: ICD-10-CM

## 2025-04-14 PROCEDURE — 73562 X-RAY EXAM OF KNEE 3: CPT | Mod: LEFT SIDE | Performed by: RADIOLOGY

## 2025-04-14 PROCEDURE — 1036F TOBACCO NON-USER: CPT | Performed by: PHYSICIAN ASSISTANT

## 2025-04-14 PROCEDURE — 4010F ACE/ARB THERAPY RXD/TAKEN: CPT | Performed by: PHYSICIAN ASSISTANT

## 2025-04-14 PROCEDURE — 73562 X-RAY EXAM OF KNEE 3: CPT | Mod: LT

## 2025-04-14 PROCEDURE — 3078F DIAST BP <80 MM HG: CPT | Performed by: PHYSICIAN ASSISTANT

## 2025-04-14 PROCEDURE — 3008F BODY MASS INDEX DOCD: CPT | Performed by: PHYSICIAN ASSISTANT

## 2025-04-14 PROCEDURE — 3075F SYST BP GE 130 - 139MM HG: CPT | Performed by: PHYSICIAN ASSISTANT

## 2025-04-14 PROCEDURE — 99214 OFFICE O/P EST MOD 30 MIN: CPT | Performed by: PHYSICIAN ASSISTANT

## 2025-04-14 ASSESSMENT — PATIENT HEALTH QUESTIONNAIRE - PHQ9
SUM OF ALL RESPONSES TO PHQ9 QUESTIONS 1 AND 2: 0
2. FEELING DOWN, DEPRESSED OR HOPELESS: NOT AT ALL
1. LITTLE INTEREST OR PLEASURE IN DOING THINGS: NOT AT ALL

## 2025-04-14 NOTE — PATIENT INSTRUCTIONS
Knee xrays.  Schedule with ortho for knee popliteal fossa    Schedule with vascular for vascular insufficiency    RIGHT leg is usually more swollen than left leg. Now left leg is more affected, thought to be related to popliteal fossa.

## 2025-04-21 ENCOUNTER — APPOINTMENT (OUTPATIENT)
Dept: PRIMARY CARE | Facility: CLINIC | Age: 65
End: 2025-04-21
Payer: COMMERCIAL

## 2025-05-01 DIAGNOSIS — R97.20 HIGH PROSTATE SPECIFIC ANTIGEN (PSA): ICD-10-CM

## 2025-05-07 ENCOUNTER — APPOINTMENT (OUTPATIENT)
Dept: ORTHOPEDIC SURGERY | Facility: CLINIC | Age: 65
End: 2025-05-07
Payer: COMMERCIAL

## 2025-05-07 DIAGNOSIS — M17.12 PRIMARY OSTEOARTHRITIS OF LEFT KNEE: Primary | ICD-10-CM

## 2025-05-07 DIAGNOSIS — M17.12 OSTEOARTHRITIS OF LEFT KNEE, UNSPECIFIED OSTEOARTHRITIS TYPE: ICD-10-CM

## 2025-05-07 PROCEDURE — 4010F ACE/ARB THERAPY RXD/TAKEN: CPT | Performed by: STUDENT IN AN ORGANIZED HEALTH CARE EDUCATION/TRAINING PROGRAM

## 2025-05-07 PROCEDURE — 99214 OFFICE O/P EST MOD 30 MIN: CPT | Performed by: STUDENT IN AN ORGANIZED HEALTH CARE EDUCATION/TRAINING PROGRAM

## 2025-05-07 PROCEDURE — L1812 KO ELASTIC W/JOINTS PRE OTS: HCPCS | Performed by: STUDENT IN AN ORGANIZED HEALTH CARE EDUCATION/TRAINING PROGRAM

## 2025-05-07 PROCEDURE — 1036F TOBACCO NON-USER: CPT | Performed by: STUDENT IN AN ORGANIZED HEALTH CARE EDUCATION/TRAINING PROGRAM

## 2025-05-07 NOTE — PROGRESS NOTES
PRIMARY CARE PHYSICIAN: Ginny Thomas PA-C  REFERRING PROVIDER: No referring provider defined for this encounter.       SUBJECTIVE  CHIEF COMPLAINT:   Chief Complaint   Patient presents with    Left Knee - New Patient Visit, Pain        HPI: Rome Mullins is a pleasant 64 y.o. year-old male who is seen today for evaluation of left knee pain.  Pains are present for 2 months.  He went to the emergency department where he was diagnosed with a Baker's cyst.  He saw his primary care doctor.  He presents due to severe pain in the left knee.  Is mostly located the medial aspect of the knee.  He does have a history of a meniscectomy done by Dr. Arora in 2009.  He uses Tylenol.  He has not tried formal physical therapy or intra-articular injections.    He complains of limited range of motion.  No marisol instability but he does feel generally unstable.  He does not require the use of assistive device.    The patient surgical history significant for spinal fusion.  Postoperatively, the patient did have bacteremia.  He was treated with long-term antibiotics.  In the past, I see the patient for his bilateral hip pain.  He continues to have bilateral hip pain.    Additionally, the patient has had multiple episodes of cellulitis.  He has been seen in the emergency department and admitted intermittently for cellulitis.  He states that since his spine surgery, he has had persistent swelling of his lower extremities.  He is scheduled to see a vascular surgeon.  He has seen a vascular surgeon in the past but never followed through with the surgical treatment recommendations.    REVIEW OF SYSTEMS  There has been no interval change in this patient's past medical, surgical, medications, allergies, family history or social history since the most recent visit to a provider within our department.  14 point review of systems was performed, reviewed, and negative except for pertinent positives documented in the history of present  illness.    Medical History[1]     Allergies[2]     Surgical History[3]     Family History[4]     Social History     Socioeconomic History    Marital status:      Spouse name: Not on file    Number of children: Not on file    Years of education: Not on file    Highest education level: Not on file   Occupational History    Not on file   Tobacco Use    Smoking status: Former     Current packs/day: 0.00     Average packs/day: 1 pack/day for 14.7 years (14.7 ttl pk-yrs)     Types: Cigarettes, Cigars     Start date: 1974     Quit date: 1989     Years since quittin.2     Passive exposure: Never    Smokeless tobacco: Former   Substance and Sexual Activity    Alcohol use: Yes     Alcohol/week: 1.0 standard drink of alcohol     Types: 1 Cans of beer per week     Comment: Sometimes 3    Drug use: Never    Sexual activity: Not Currently     Partners: Female   Other Topics Concern    Not on file   Social History Narrative    Not on file     Social Drivers of Health     Financial Resource Strain: Low Risk  (3/14/2025)    Overall Financial Resource Strain (CARDIA)     Difficulty of Paying Living Expenses: Not hard at all   Food Insecurity: No Food Insecurity (3/14/2025)    Hunger Vital Sign     Worried About Running Out of Food in the Last Year: Never true     Ran Out of Food in the Last Year: Never true   Transportation Needs: No Transportation Needs (3/14/2025)    PRAPARE - Transportation     Lack of Transportation (Medical): No     Lack of Transportation (Non-Medical): No   Physical Activity: Not on file   Stress: Not on file   Social Connections: Unknown (2024)    Social Connection and Isolation Panel [NHANES]     Frequency of Communication with Friends and Family: Not on file     Frequency of Social Gatherings with Friends and Family: Not on file     Attends Faith Services: Not on file     Active Member of Clubs or Organizations: Not on file     Attends Club or Organization Meetings: Not on file      Marital Status:    Intimate Partner Violence: Not At Risk (3/14/2025)    Humiliation, Afraid, Rape, and Kick questionnaire     Fear of Current or Ex-Partner: No     Emotionally Abused: No     Physically Abused: No     Sexually Abused: No   Housing Stability: Low Risk  (3/14/2025)    Housing Stability Vital Sign     Unable to Pay for Housing in the Last Year: No     Number of Times Moved in the Last Year: 0     Homeless in the Last Year: No        CURRENT MEDICATIONS:   Current Medications[5]     OBJECTIVE    PHYSICAL EXAM  There is no height or weight on file to calculate BMI.    General: Well-appearing male in no acute distress.  Awake, alert and oriented.  Pleasant and cooperative.  Respiratory: Non-labored breathing  Mood: Euthymic   Gait: Antalgic  Assistive Device: None     Affected Left Knee  Limb Alignment: Varus  ROM: 10-90  Stable to varus and valgus stress at full extension and 30 degrees of flexion  Skin: Intact, no abrasions or draining sinuses  Effusion: Moderate  Quad Strength: 5/5  Hamstring Strength: 5/5  Patella Crepitus: None  Patella Grind: Negative  Tenderness: Medial joint line and posterior knee  Sensation: Intact to light touch distally  Motor function: Able to fire TA, EHL, G/S  Pulses: Palpable DP pulse  Significant lower extremity edema including calf swelling.    IMAGING:  AP / lateral/ mid-flexion/sunrise views: Independent review of left knee x-rays was performed. The findings were reviewed with the patient. There are severe degenerative changes of the left knee with varus limb alignment. There is joint space narrowing, subchondral sclerosis, and osteophyte formation. No evidence of fracture, AVN, dislocation, osteomyelitis.        ASSESSMENT & PLAN    IMPRESSION:  Rome Mullins is a 64 y.o. male with end-stage osteoarthritis of the left knee.  We discussed treatment options.  At this time, given the proximity to his recent cellulitis diagnosis, I do not believe that the  patient would be an appropriate candidate for an intra-articular injection.  Additionally, I believe the patient needs to have a more thorough vascular workup prior to any invasive procedure.    PLAN:  I recommended the patient engage in a home exercise regimen.  A home exercise program was prescribed to the patient.  We also discussed the role of knee bracing.  I did provide the patient with a medial  brace.  Patient was prescribed a medial  knee brace for left knee arthritis.The patient is ambulatory with or without aid; but, has weakness, instability and/or deformity of their left knee which requires stabilization from this orthosis to improve their function.       Verbal and written instructions for the use, wear schedule, cleaning and application of this item were given.  Patient was instructed that should the brace result in increased pain, decreased sensation, increased swelling, or an overall worsening of their medical condition, to please contact our office immediately.      Orthotic management and training was provided for skin care, modifications due to healing tissues, edema changes, interruption in skin integrity, and safety precautions with the orthosis.    Asked the patient discuss his knee brace with his vascular doctor to see if there is any concerns with him using the brace given his extreme lower extremity swelling.    I will see the patient back in 3 months.  At that time, we can revisit his candidacy for an intra-articular injection.    *This note was created using voice recognition software and was not corrected for typographical or grammatical errors.*                                 [1]   Past Medical History:  Diagnosis Date    Allergic     Anxiety     Arthritis 09/01/2023    Clotting disorder (Multi)     Depression     Difficulty walking 2003    HL (hearing loss) 05/01/2023    Hypertension 2000?    VICENTE (obstructive sleep apnea)     Pulmonary embolism     Urinary tract  infection 01/01/2015   [2]   Allergies  Allergen Reactions    Fentanyl Palpitations and Unknown     rapid heart rate    Duloxetine GI Upset    Venlafaxine GI Upset    Methadone Palpitations   [3]   Past Surgical History:  Procedure Laterality Date    BACK SURGERY  01/01/2002    CHOLECYSTECTOMY  01/01/2008    HERNIA REPAIR  01/01/2011    JOINT REPLACEMENT  01/01/2010    SPINE SURGERY  01/01/2002    TOENAIL EXCISION  2019    TONSILLECTOMY  01/01/1971    WISDOM TOOTH EXTRACTION  01/01/1998   [4]   Family History  Problem Relation Name Age of Onset    Mental illness Mother Chey     Hypertension Mother Chey     Diabetes Mother Chey     Peripheral vascular disease Father Sd     Diabetes Father Sd     Hypertension Father Sd     Cancer Father Sd     No Known Problems Sister      No Known Problems Daughter      No Known Problems Son      Diabetes Other fam hx     Heart disease Other fam hx     Stroke Other fam hx     Hypertension Other fam hx     Cancer Other fam hx    [5]   Current Outpatient Medications   Medication Sig Dispense Refill    acetaminophen (Tylenol) 500 mg tablet Take 2 tablets (1,000 mg) by mouth every 8 hours if needed.      albuterol 90 mcg/actuation inhaler Inhale 1-2 puffs every 6 hours if needed for wheezing or shortness of breath. 18 g 1    apixaban (Eliquis) 5 mg tablet Take 1 tablet (5 mg) by mouth 2 times a day. 180 tablet 1    atorvastatin (Lipitor) 10 mg tablet Take 1 tablet (10 mg) by mouth once daily. 90 tablet 3    furosemide (Lasix) 20 mg tablet Take 1 tablet (20 mg) by mouth once daily for 7 days. 7 tablet 0    gabapentin (Neurontin) 600 mg tablet Take 1 tablet (600 mg) by mouth 2 times a day. 180 tablet 1    lisinopril 10 mg tablet TAKE ONE TABLET BY MOUTH EVERY DAY FOR 30 DAYS. 90 tablet 3    metFORMIN (Glucophage) 500 mg tablet Take 1 tablet (500 mg) by mouth 2 times a day. 180 tablet 3    metoprolol tartrate (Lopressor) 25 mg tablet Take 1 tablet (25 mg) by mouth 2 times a day. 180  tablet 3    omeprazole (PriLOSEC) 40 mg DR capsule Take 1 capsule (40 mg) by mouth once daily. 180 capsule 0    sertraline (Zoloft) 100 mg tablet TAKE ONE TABLET BY MOUTH ONCE EVERY DAY 90 tablet 3    tadalafil (Cialis) 5 mg tablet Take 1 tablet (5 mg) by mouth once daily. 30 tablet 11     No current facility-administered medications for this visit.

## 2025-05-07 NOTE — LETTER
May 7, 2025     Patient: Rome Mullins   YOB: 1960   Date of Visit: 5/7/2025       To Whom It May Concern:    Rome Mullins was seen in my clinic on 5/7/2025 at 3:00 pm. Please excuse Danii for her absence from work on this day to accompany Rome to the appointment.    If you have any questions or concerns, please don't hesitate to call.         Sincerely,         Damon Logan MD        CC: No Recipients

## 2025-05-14 ENCOUNTER — APPOINTMENT (OUTPATIENT)
Dept: PRIMARY CARE | Facility: CLINIC | Age: 65
End: 2025-05-14
Payer: COMMERCIAL

## 2025-05-16 ENCOUNTER — ANCILLARY PROCEDURE (OUTPATIENT)
Dept: URGENT CARE | Age: 65
End: 2025-05-16
Payer: COMMERCIAL

## 2025-05-16 ENCOUNTER — OFFICE VISIT (OUTPATIENT)
Dept: URGENT CARE | Age: 65
End: 2025-05-16
Payer: COMMERCIAL

## 2025-05-16 VITALS
HEIGHT: 73 IN | OXYGEN SATURATION: 96 % | TEMPERATURE: 98.5 F | DIASTOLIC BLOOD PRESSURE: 65 MMHG | WEIGHT: 304.24 LBS | BODY MASS INDEX: 40.32 KG/M2 | RESPIRATION RATE: 18 BRPM | HEART RATE: 70 BPM | SYSTOLIC BLOOD PRESSURE: 121 MMHG

## 2025-05-16 DIAGNOSIS — L97.211 VENOUS STASIS ULCER OF RIGHT CALF LIMITED TO BREAKDOWN OF SKIN WITHOUT VARICOSE VEINS: ICD-10-CM

## 2025-05-16 DIAGNOSIS — R09.89 CHEST CONGESTION: ICD-10-CM

## 2025-05-16 DIAGNOSIS — R05.1 ACUTE COUGH: Primary | ICD-10-CM

## 2025-05-16 DIAGNOSIS — I87.2 VENOUS STASIS ULCER OF RIGHT CALF LIMITED TO BREAKDOWN OF SKIN WITHOUT VARICOSE VEINS: ICD-10-CM

## 2025-05-16 DIAGNOSIS — I87.8 CHRONIC VENOUS STASIS: ICD-10-CM

## 2025-05-16 LAB
POC CORONAVIRUS SARS-COV-2 PCR: NEGATIVE
POC HUMAN RHINOVIRUS PCR: NEGATIVE
POC INFLUENZA A VIRUS PCR: NEGATIVE
POC INFLUENZA B VIRUS PCR: NEGATIVE
POC RESPIRATORY SYNCYTIAL VIRUS PCR: NEGATIVE

## 2025-05-16 PROCEDURE — 71046 X-RAY EXAM CHEST 2 VIEWS: CPT

## 2025-05-16 RX ORDER — CETIRIZINE HYDROCHLORIDE 10 MG/1
10 TABLET ORAL DAILY
Qty: 30 TABLET | Refills: 0 | Status: SHIPPED | OUTPATIENT
Start: 2025-05-16 | End: 2025-06-15

## 2025-05-16 RX ORDER — FLUTICASONE PROPIONATE 50 MCG
1 SPRAY, SUSPENSION (ML) NASAL DAILY
Qty: 16 G | Refills: 0 | Status: SHIPPED | OUTPATIENT
Start: 2025-05-16 | End: 2025-06-15

## 2025-05-16 RX ORDER — BENZONATATE 200 MG/1
200 CAPSULE ORAL 3 TIMES DAILY PRN
Qty: 21 CAPSULE | Refills: 0 | Status: SHIPPED | OUTPATIENT
Start: 2025-05-16 | End: 2025-05-23

## 2025-05-16 RX ORDER — DOXYCYCLINE HYCLATE 100 MG
100 TABLET ORAL 2 TIMES DAILY
Qty: 14 TABLET | Refills: 0 | Status: SHIPPED | OUTPATIENT
Start: 2025-05-16 | End: 2025-05-23

## 2025-05-16 ASSESSMENT — PATIENT HEALTH QUESTIONNAIRE - PHQ9
SUM OF ALL RESPONSES TO PHQ9 QUESTIONS 1 & 2: 2
1. LITTLE INTEREST OR PLEASURE IN DOING THINGS: SEVERAL DAYS
2. FEELING DOWN, DEPRESSED OR HOPELESS: SEVERAL DAYS

## 2025-05-16 NOTE — PROGRESS NOTES
"Subjective   Patient ID: Rome Mullins \"Yarely" is a 64 y.o. male. They present today with a chief complaint of Nasal Congestion (White phlegm started the beginning of this week.) and Earache (RIGHT ear feels odd).    History of Present Illness  See mdm       History provided by:  Patient   used: No        Past Medical History  Allergies as of 05/16/2025 - Reviewed 05/16/2025   Allergen Reaction Noted    Fentanyl Palpitations and Unknown 04/02/2007    Duloxetine GI Upset 09/09/2023    Venlafaxine GI Upset 09/09/2023    Methadone Palpitations 09/09/2023       Prescriptions Prior to Admission[1]     Medical History[2]    Surgical History[3]     reports that he quit smoking about 36 years ago. His smoking use included cigarettes and cigars. He started smoking about 50 years ago. He has a 14.7 pack-year smoking history. He has never been exposed to tobacco smoke. He has quit using smokeless tobacco. He reports current alcohol use of about 1.0 standard drink of alcohol per week. He reports that he does not use drugs.    Review of Systems  Review of Systems   All other systems reviewed and are negative.                                 Objective    Vitals:    05/16/25 1719   BP: 121/65   Pulse: 70   Resp: 18   Temp: 36.9 °C (98.5 °F)   TempSrc: Oral   SpO2: 96%   Weight: 138 kg (304 lb 3.8 oz)   Height: 1.854 m (6' 1\")     No LMP for male patient.    Physical Exam  Vitals and nursing note reviewed.   Constitutional:       General: He is not in acute distress.     Appearance: He is normal weight. He is not ill-appearing, toxic-appearing or diaphoretic.   HENT:      Head: Normocephalic and atraumatic.      Right Ear: Tympanic membrane, ear canal and external ear normal.      Left Ear: Tympanic membrane, ear canal and external ear normal.      Nose: Nose normal.      Mouth/Throat:      Mouth: Mucous membranes are moist.      Pharynx: No oropharyngeal exudate or posterior oropharyngeal erythema.   Eyes: "      General: No scleral icterus.        Right eye: No discharge.         Left eye: No discharge.      Extraocular Movements: Extraocular movements intact.      Conjunctiva/sclera: Conjunctivae normal.      Pupils: Pupils are equal, round, and reactive to light.   Cardiovascular:      Rate and Rhythm: Normal rate and regular rhythm.      Pulses: Normal pulses.      Heart sounds: No murmur heard.     No friction rub. No gallop.   Pulmonary:      Effort: Pulmonary effort is normal. No respiratory distress.      Breath sounds: No stridor. No wheezing, rhonchi or rales.   Abdominal:      General: Abdomen is flat.   Musculoskeletal:         General: Tenderness present. No swelling.      Cervical back: Normal range of motion and neck supple. No rigidity or tenderness.      Right lower leg: No edema.      Left lower leg: No edema.      Comments: LE: bilateral erythema of the lower legs with tenderness, significant scaling skin, small superficial seeping venous ulcer of the right leg, minimal edema.  No asymmetry.  NVI    Skin:     General: Skin is warm and dry.      Capillary Refill: Capillary refill takes less than 2 seconds.   Neurological:      General: No focal deficit present.      Mental Status: He is alert.   Psychiatric:         Mood and Affect: Mood normal.         Behavior: Behavior normal.         Procedures    Point of Care Test & Imaging Results from this visit  Results for orders placed or performed in visit on 05/16/25   POCT SPOTFIRE R/ST Panel Mini w/COVID ([facility]) manually resulted    Specimen: Swab   Result Value Ref Range    POC Sars-Cov-2 PCR Negative Negative    POC Respiratory Syncytial Virus PCR Negative Negative    POC Influenza A Virus PCR Negative Negative    POC Influenza B Virus PCR Negative Negative    POC Human Rhinovirus PCR Negative Negative      Imaging  XR chest 2 views  Result Date: 5/16/2025  1.  No evidence of acute cardiopulmonary process.     Signed by: Jon Cha 5/16/2025  6:45 PM Dictation workstation:   [id number]      Cardiology, Vascular, and Other Imaging  No other imaging results found for the past 2 days      Diagnostic study results (if any) were reviewed by Rebeca Mead PA-C.    Assessment/Plan   Allergies, medications, history, and pertinent labs/EKGs/Imaging reviewed by Rebeca Mead PA-C.     Medical Decision Making  64 year old M with Pmhx of NIDDMT2, PE, atrial fibrillation, anticoagulated with eliquis presents with complaint of cough, sputum production and nasal congestion for 1 week.  Wife and several other family members sick with similar illness.  Significant sputum production.  No fevers or chills.  Reports history of smoking for 15 years, quit in 1989.  He has no asthma or COPD however did have complicated course with ICU stay related to COVID 19 infection.  He wears 2L nighttime oxygen since COVID infection.  No CP or SOB.  Complaint with eliquis has not missed a dose.  He reports chronic leg pain and swelling, for which he has seen PCP and is scheduled to see vascular.  He has had several DVT studies most recently 3/14/25 which were negative.  He was discharged from leg swelling admission in middle of march with diagnosis of chronic venous stasis and bakers cyst of left popliteal fossa.  Exam with bilateral erythema of the lower legs with tenderness, significant scaling skin, small superficial seeping venous ulcer of the right leg, minimal edema.  No asymmetry.  Reports legs have been like this for months.  Will refer to wound care, encouraged to follow up with vascular as well.  Exam and history not suggestive of lower extremity cellulitis, DVT, ischemic limb, decompensated CHF, reactive airway, sepsis, respiratory distress or other emergency.  Suspect chronic skin changes of legs related to venous insufficiency and venous ulcer.  Feel he would benefit from wound care and provided referral.  Encouraged him to follow-up with both wound care and  vascular.  Chest x-ray without acute cardiopulmonary process.  Viral testing is negative.  Given multiple comorbidities, 1 week of symptoms and significant sputum production will treat with doxycycline with consideration for atypical pneumonia.  Provided further symptomatic control.  Encouraged follow-up with primary care provider.  Discussed expected course, indications for return or for presentation to emergency department.  Discharged good condition agreeable to plan as discussed.      Orders and Diagnoses  Diagnoses and all orders for this visit:  Acute cough  -     doxycycline (Vibra-Tabs) 100 mg tablet; Take 1 tablet (100 mg) by mouth 2 times a day for 7 days. Take with a full glass of water and do not lie down for at least 30 minutes after.  -     cetirizine (ZyrTEC) 10 mg tablet; Take 1 tablet (10 mg) by mouth once daily.  -     fluticasone (Flonase) 50 mcg/actuation nasal spray; Administer 1 spray into each nostril once daily. Shake gently. Before first use, prime pump. After use, clean tip and replace cap.  -     benzonatate (Tessalon) 200 mg capsule; Take 1 capsule (200 mg) by mouth 3 times a day as needed for cough for up to 7 days. Do not crush or chew.  Chest congestion  -     POCT SPOTFIRE R/ST Panel Mini w/COVID ([facility]) manually resulted  -     XR chest 2 views; Future  Chronic venous stasis  -     Referral to Wound Clinic; Future  Venous stasis ulcer of right calf limited to breakdown of skin without varicose veins      Medical Admin Record      Patient disposition: Home    Electronically signed by Rebeca Mead PA-C  7:55 PM           [1] (Not in a hospital admission)   [2]   Past Medical History:  Diagnosis Date    Allergic     Anxiety     Arthritis 09/01/2023    Clotting disorder (Multi)     Depression     Difficulty walking 2003    HL (hearing loss) 05/01/2023    Hypertension 2000?    VICENTE (obstructive sleep apnea)     Pulmonary embolism     Urinary tract infection 01/01/2015   [3]    Past Surgical History:  Procedure Laterality Date    BACK SURGERY  01/01/2002    CHOLECYSTECTOMY  01/01/2008    HERNIA REPAIR  01/01/2011    JOINT REPLACEMENT  01/01/2010    SPINE SURGERY  01/01/2002    TOENAIL EXCISION  2019    TONSILLECTOMY  01/01/1971    WISDOM TOOTH EXTRACTION  01/01/1998

## 2025-05-27 ENCOUNTER — APPOINTMENT (OUTPATIENT)
Dept: PRIMARY CARE | Facility: CLINIC | Age: 65
End: 2025-05-27
Payer: COMMERCIAL

## 2025-05-28 ENCOUNTER — APPOINTMENT (OUTPATIENT)
Dept: UROLOGY | Facility: CLINIC | Age: 65
End: 2025-05-28
Payer: COMMERCIAL

## 2025-05-30 DIAGNOSIS — F41.9 ANXIETY: ICD-10-CM

## 2025-06-02 ENCOUNTER — APPOINTMENT (OUTPATIENT)
Dept: DERMATOLOGY | Facility: HOSPITAL | Age: 65
End: 2025-06-02
Payer: COMMERCIAL

## 2025-06-02 RX ORDER — SERTRALINE HYDROCHLORIDE 100 MG/1
150 TABLET, FILM COATED ORAL DAILY
Qty: 135 TABLET | Refills: 3 | Status: SHIPPED | OUTPATIENT
Start: 2025-06-02

## 2025-06-06 ENCOUNTER — TELEPHONE (OUTPATIENT)
Dept: PRIMARY CARE | Facility: CLINIC | Age: 65
End: 2025-06-06
Payer: COMMERCIAL

## 2025-06-06 DIAGNOSIS — L03.116 CELLULITIS OF LEFT LOWER LIMB: ICD-10-CM

## 2025-06-06 RX ORDER — FUROSEMIDE 20 MG/1
20 TABLET ORAL DAILY
Qty: 5 TABLET | Refills: 0 | Status: SHIPPED | OUTPATIENT
Start: 2025-06-06 | End: 2025-06-11

## 2025-06-06 NOTE — TELEPHONE ENCOUNTER
Pt has a bakers cyst, saw ortho and [facility].  Ortho doctor cannot do anything til the swelling in his leg goes down.  His leg is currently very swollen, hurts to put weight on it, keeping him up at night due to the pain.  Ortho said he has a chipped meniscus and water on the knee.  What can he do?

## 2025-06-07 LAB — PSA SERPL-MCNC: 2.51 NG/ML

## 2025-06-09 ENCOUNTER — ANCILLARY PROCEDURE (OUTPATIENT)
Dept: URGENT CARE | Age: 65
End: 2025-06-09
Payer: COMMERCIAL

## 2025-06-09 ENCOUNTER — OFFICE VISIT (OUTPATIENT)
Dept: URGENT CARE | Age: 65
End: 2025-06-09
Payer: COMMERCIAL

## 2025-06-09 VITALS
WEIGHT: 295 LBS | TEMPERATURE: 99.4 F | HEART RATE: 78 BPM | OXYGEN SATURATION: 94 % | BODY MASS INDEX: 38.92 KG/M2 | SYSTOLIC BLOOD PRESSURE: 118 MMHG | DIASTOLIC BLOOD PRESSURE: 61 MMHG | RESPIRATION RATE: 19 BRPM

## 2025-06-09 DIAGNOSIS — R05.2 SUBACUTE COUGH: ICD-10-CM

## 2025-06-09 DIAGNOSIS — J01.40 ACUTE NON-RECURRENT PANSINUSITIS: Primary | ICD-10-CM

## 2025-06-09 DIAGNOSIS — J40 BRONCHITIS: ICD-10-CM

## 2025-06-09 PROCEDURE — 71046 X-RAY EXAM CHEST 2 VIEWS: CPT

## 2025-06-09 RX ORDER — AMOXICILLIN AND CLAVULANATE POTASSIUM 875; 125 MG/1; MG/1
875 TABLET, FILM COATED ORAL 2 TIMES DAILY
Qty: 14 TABLET | Refills: 0 | Status: SHIPPED | OUTPATIENT
Start: 2025-06-09 | End: 2025-06-16

## 2025-06-09 ASSESSMENT — ENCOUNTER SYMPTOMS
CHEST TIGHTNESS: 0
EYE PAIN: 0
FATIGUE: 0
FEVER: 0
VOMITING: 0
CHILLS: 0
EYE DISCHARGE: 0
SINUS PRESSURE: 1
EYE ITCHING: 0
WHEEZING: 0
SORE THROAT: 0
ABDOMINAL PAIN: 0
DIZZINESS: 0
DIARRHEA: 0
COUGH: 1
STRIDOR: 0

## 2025-06-09 NOTE — PATIENT INSTRUCTIONS
Discharge instructions:    Please follow up with your Primary Care Physician within the next 2-3 days.    Symptoms consistent with sinusitis with mixed bronchitis.  To treat with Augmentin recommending over-the-counter symptomatic medications.    If no improvement in symptoms over the next 24 to 48 hours with medical therapy or you develop any chest pain, worsening shortness of breath, difficulty breathing, severe symptoms you must immediately go to the emergency room.  Follow-up as above.    It is important to take prescriptions as prescribed and complete all antibiotics.     If your symptoms worsen you are instructed to immediately go to the emergency room for reevaluation and further assessment.    If you develop any chest pain, SOB, or difficulty breathing you are instructed to go to the emergency room for reevaluation.    All discharge instructions will be provided and explained to the patient at discharge.    If you have any questions regarding your treatment plan please call the [facility] urgent care clinic.

## 2025-06-10 NOTE — PROGRESS NOTES
"Subjective   Patient ID: Rome Mullins \"Yarely" is a 64 y.o. male.      HPI  64 y.o. male presents for a follow up visit regarding an elevated PSA level at 2.51. The patient was seen by Dr. Ferguson for monitoring his mild urinary symptoms. He initially had a UTI that elevated his PSA level. He has been monitoring his PSA closely since.     At the patient's last visit, he was to start Cialis 5 mg daily. Since starting the medication, he has noticed improvement in his urinary symptoms. The patient was given a prescription of a diuretic. He is on his last dosage today. He indicates that he is experiencing 3 episodes of nocturia due to the diuretic.     The patient is not sexually active. He has a small amount of sexual function, he can have an erection. The erection is flaccid, not enough to penetrate, although it does last.     He has a history of hematuria and underwent a cystoscopy in 12/20/2023. The results were negative.     The patient has a family history of prostate cancer in his father. The patient's father had radiation for the prostate cancer.     He was a smoker, has not smoked since 1980.     Lab Results   Component Value Date    PSA 2.51 06/06/2025    PSA 2.48 03/21/2024    PSA 2.8 05/15/2020         Review of Systems  A complete review of systems was performed. All systems are noted to be negative unless indicated in the history of present illness, impression, active problem list, or past histories.     Objective   Physical Exam    Assessment/Plan   Diagnoses and all orders for this visit:  Erectile dysfunction, unspecified erectile dysfunction type  -     tadalafil 20 mg tablet; Take 1 tablet (20 mg) by mouth if needed for erectile dysfunction (take 1 pill 1-2hrs before sexual acitivty).  Benign prostatic hyperplasia with lower urinary tract symptoms, symptom details unspecified  -     tadalafil (Cialis) 5 mg tablet; Take 1 tablet (5 mg) by mouth once daily.      64 y.o. male presents for a follow up " visit regarding an elevated PSA level at 2.51. The patient was seen by Dr. Ferguson for monitoring his mild urinary symptoms. He initially had a UTI that elevated his PSA level. He has been monitoring his PSA closely since.     I personally reviewed the patient's PSA level that has been normal for several years.  Due to his age and family history of prostate cancer, I would recommend continuing to monitor his PSA level yearly.     The patient returns today with erectile dysfunction which has not had significant improvement on Cialis 5 mg daily. As such, I recommended trying Cialis 20 mg PRN on an empty stomach, 2 hours prior to intercourse. He will contact us if he continues to struggle with the medication.     Plan:  Continue Cialis 5 mg daily  Start Cialis 20 mg on demand, on an empty stomach, 2 hours prior to intercourse   FUV with urology in 6 months     Scribe Attestation   By signing my name below, I, Kamilah Mancia, Scribe attestation that this documentation has been prepared under the direction and in the presence of Wu Pantoja MD.

## 2025-06-11 ENCOUNTER — APPOINTMENT (OUTPATIENT)
Dept: UROLOGY | Facility: CLINIC | Age: 65
End: 2025-06-11
Payer: COMMERCIAL

## 2025-06-11 DIAGNOSIS — N40.1 BENIGN PROSTATIC HYPERPLASIA WITH LOWER URINARY TRACT SYMPTOMS, SYMPTOM DETAILS UNSPECIFIED: ICD-10-CM

## 2025-06-11 DIAGNOSIS — N52.9 ERECTILE DYSFUNCTION, UNSPECIFIED ERECTILE DYSFUNCTION TYPE: Primary | ICD-10-CM

## 2025-06-11 PROCEDURE — 4010F ACE/ARB THERAPY RXD/TAKEN: CPT | Performed by: STUDENT IN AN ORGANIZED HEALTH CARE EDUCATION/TRAINING PROGRAM

## 2025-06-11 PROCEDURE — 99214 OFFICE O/P EST MOD 30 MIN: CPT | Performed by: STUDENT IN AN ORGANIZED HEALTH CARE EDUCATION/TRAINING PROGRAM

## 2025-06-11 RX ORDER — TADALAFIL 5 MG/1
5 TABLET ORAL DAILY
Qty: 30 TABLET | Refills: 11 | Status: SHIPPED | OUTPATIENT
Start: 2025-06-11 | End: 2026-06-11

## 2025-06-11 RX ORDER — TADALAFIL 20 MG/1
20 TABLET ORAL AS NEEDED
Qty: 5 TABLET | Refills: 11 | Status: SHIPPED | OUTPATIENT
Start: 2025-06-11 | End: 2026-06-11

## 2025-06-26 ASSESSMENT — PROMIS GLOBAL HEALTH SCALE
RATE_AVERAGE_PAIN: 8
EMOTIONAL_PROBLEMS: SOMETIMES
RATE_QUALITY_OF_LIFE: FAIR
CARRYOUT_SOCIAL_ACTIVITIES: FAIR
RATE_SOCIAL_SATISFACTION: POOR
CARRYOUT_PHYSICAL_ACTIVITIES: A LITTLE
RATE_MENTAL_HEALTH: FAIR
RATE_AVERAGE_FATIGUE: MODERATE
RATE_GENERAL_HEALTH: FAIR
RATE_PHYSICAL_HEALTH: POOR

## 2025-07-02 ENCOUNTER — APPOINTMENT (OUTPATIENT)
Dept: PRIMARY CARE | Facility: CLINIC | Age: 65
End: 2025-07-02
Payer: COMMERCIAL

## 2025-07-02 VITALS
TEMPERATURE: 96.9 F | WEIGHT: 298.2 LBS | DIASTOLIC BLOOD PRESSURE: 64 MMHG | SYSTOLIC BLOOD PRESSURE: 132 MMHG | HEIGHT: 73 IN | HEART RATE: 71 BPM | BODY MASS INDEX: 39.52 KG/M2 | OXYGEN SATURATION: 94 %

## 2025-07-02 DIAGNOSIS — Z00.00 ROUTINE GENERAL MEDICAL EXAMINATION AT A HEALTH CARE FACILITY: Primary | ICD-10-CM

## 2025-07-02 DIAGNOSIS — E11.9 TYPE 2 DIABETES MELLITUS WITHOUT COMPLICATION, WITHOUT LONG-TERM CURRENT USE OF INSULIN: ICD-10-CM

## 2025-07-02 DIAGNOSIS — E55.9 VITAMIN D INSUFFICIENCY: ICD-10-CM

## 2025-07-02 DIAGNOSIS — E78.5 BORDERLINE HYPERLIPIDEMIA: ICD-10-CM

## 2025-07-02 DIAGNOSIS — E53.8 B12 DEFICIENCY: ICD-10-CM

## 2025-07-02 DIAGNOSIS — Z12.5 SCREENING FOR MALIGNANT NEOPLASM OF PROSTATE: ICD-10-CM

## 2025-07-02 LAB — POC HEMOGLOBIN A1C: 6.9 % (ref 4.2–6.5)

## 2025-07-02 PROCEDURE — 1036F TOBACCO NON-USER: CPT | Performed by: PHYSICIAN ASSISTANT

## 2025-07-02 PROCEDURE — 83036 HEMOGLOBIN GLYCOSYLATED A1C: CPT | Performed by: PHYSICIAN ASSISTANT

## 2025-07-02 PROCEDURE — 3044F HG A1C LEVEL LT 7.0%: CPT | Performed by: PHYSICIAN ASSISTANT

## 2025-07-02 PROCEDURE — 3075F SYST BP GE 130 - 139MM HG: CPT | Performed by: PHYSICIAN ASSISTANT

## 2025-07-02 PROCEDURE — 3008F BODY MASS INDEX DOCD: CPT | Performed by: PHYSICIAN ASSISTANT

## 2025-07-02 PROCEDURE — 4010F ACE/ARB THERAPY RXD/TAKEN: CPT | Performed by: PHYSICIAN ASSISTANT

## 2025-07-02 PROCEDURE — 99396 PREV VISIT EST AGE 40-64: CPT | Performed by: PHYSICIAN ASSISTANT

## 2025-07-02 PROCEDURE — 3078F DIAST BP <80 MM HG: CPT | Performed by: PHYSICIAN ASSISTANT

## 2025-07-02 ASSESSMENT — PATIENT HEALTH QUESTIONNAIRE - PHQ9
2. FEELING DOWN, DEPRESSED OR HOPELESS: NEARLY EVERY DAY
1. LITTLE INTEREST OR PLEASURE IN DOING THINGS: NEARLY EVERY DAY
SUM OF ALL RESPONSES TO PHQ9 QUESTIONS 1 AND 2: 6

## 2025-07-02 NOTE — PROGRESS NOTES
Subjective     HPI   Rome Mullins is a 65 y.o. year old male patient with presenting to clinic with concern for   Chief Complaint   Patient presents with    Annual Exam         Ongoing left knee pain. Needs to work on vascular issues first. Seeing vein clinic for treatment in 2 weeks. Following up possible knee replacement next month.     HTN  -lisinopril 10  -metoprolol tartrate 25mg bid (and PAF)  BP Readings from Last 5 Encounters:   07/02/25 132/64   06/09/25 118/61   05/16/25 121/65   04/14/25 134/60   04/01/25 130/65     HLD  -atorvastatin 10    11/19/24 [facility]  Non HDL Cholesterol  <130 mg/dL 117     Lab Results   Component Value Date    LDLCALC 128 (H) 03/21/2024     The 10-year ASCVD risk score (Renan LEROY, et al., 2019) is: 27.4%    Values used to calculate the score:      Age: 65 years      Sex: Male      Is Non- : No      Diabetic: Yes      Tobacco smoker: No      Systolic Blood Pressure: 132 mmHg      Is BP treated: Yes      HDL Cholesterol: 38.9 mg/dL      Total Cholesterol: 194 mg/dL    Afib  -metoprolol  -Eliquis     Hx PE & LLE DVT  - Eliquis    DM  -metformin 500bid  Lab Results   Component Value Date    HGBA1C 6.9 (A) 07/02/2025    HGBA1C 6.6 (H) 11/19/2024    HGBA1C 6.8 (A) 10/14/2024     Lab Results   Component Value Date    LDLCALC 128 (H) 03/21/2024    CREATININE 0.93 03/15/2025   Diabetes Composite Score: 5   Values used to calculate this score:    Points  Metrics       1        Blood Pressure: 118/61       1        Prescribed Statins: Yes       1        Hemoglobin A1c: 6.6%       1        Smokes Tobacco: No       1        Prescribed Aspirin: Yes    Optho 10/2024  Neuropathy  -gabapentin        Venous stasis bilat LE  Lipodermatosclerosis of both lower extremities  -LISA hose  -elevated LE  Recommended venous insufficiency studies, right leg was worse than left at that time. Left side is worsened.   Lost to follow up last year under Linette Barroso DNP    Chronic LLE  edema with LLE baker's cyst shown on US with vascular insuficiency.     Large Baker cyst L popliteal, increasingly sore.   Hx meniscus tear- surgery by Dr Arora approx   Dr Damon Logan, follow up due Aug 2025  Needs vascular work up prior to invasive procedure and cellulitis was too recent d/t cellulitis     Tate went to vascular clinic and is going in 2 weeks for vessel treatment.     GERD  -omeprazole 40     Anxiety  -zoloft 100     PE   DVT LLE and PE following back surgery  IVC filter      VICENTE   Compliant with CPAP d/t claustrophoia with 2lpm       Home O2 at bedtime only  (since Covid illness)      Pt concerned for limb movements and jerking while asleep and falling asleep. As of 2022, no RLS noted during sleep study.      High PSA (2.8) & ED  Dr [facility] (elevated PSA 2/2 UTI)  -Cialis 5     Lab Results   Component Value Date    PSA 2.51 2025    PSA 2.48 2024    PSA 2.8 05/15/2020       Severe COVID  with 1 month hospitalization  Dr Mckeon  -albuterol PRN  PNA 2024     Chronic low back pain  Postlaminectomy syndrome, DDD lumbar, Fibromyalgia, and neuropathy  -gabapentin 600 bid      Arthritis  [facility] arthritis  Dr Silviano Fields hip TKR   Dr Logan- referred to him for knee eval. Xrays pending.     Former smoker  1 ppd 15 yr [id number]           Family history reviewed  No family history of breast cancer  No family history of colon cancer   No family history of heart disease (or unexpected/unexplained death <age 50)      Problem List[1]    Medical History[2]   Surgical History[3]   Family History[4]   Social History     Tobacco Use    Smoking status: Former     Current packs/day: 0.00     Average packs/day: 1 pack/day for 14.7 years (14.7 ttl pk-yrs)     Types: Cigarettes, Cigars     Start date: 1974     Quit date: 1989     Years since quittin.4     Passive exposure: Never    Smokeless tobacco: Former   Substance Use Topics    Alcohol use:  "Not Currently     Alcohol/week: 1.0 standard drink of alcohol     Types: 1 Cans of beer per week     Comment: Sometimes 3      Current Medications[5]     Review of Systems  Constitutional: Denies fever  HEENT: Denies ST, earache  CVS: Denies Chest pain  Pulmonary: Denies wheezing, SOB  GI: Denies N/V  : Denies dysuria  Musculoskeletal:  Denies myalgia  Neuro: Denies focal weakness or numbness.  Skin: Denies Rashes.  *Review of Systems is negative unless otherwise mentioned in HPI or ROS above.    Objective   /64   Pulse 71   Temp 36.1 °C (96.9 °F)   Ht 1.854 m (6' 1\")   Wt 135 kg (298 lb 3.2 oz)   SpO2 94%   BMI 39.34 kg/m²  reviewed Body mass index is 39.34 kg/m².     Physical Exam  Constitutional: NAD.  Resting comfortably.  Head: Atraumatic, normocephalic.  ENT: Moist oral mucosa. Nasal mucosa wnl.   Cardiac: Regular rate & rhythm.   Pulmonary: Lungs clear bilat  GI: Soft, Nontender, nondistended.   Musculoskeletal: No peripheral edema.   Skin: No evidence of trauma. No rashes  Psych: Intact judgement and insight.    .Assessment/Plan   Problem List Items Addressed This Visit    None  Visit Diagnoses         Codes      Routine general medical examination at a health care facility    -  Primary Z00.00      Type 2 diabetes mellitus without complication, without long-term current use of insulin     E11.9    Relevant Orders    POCT glycosylated hemoglobin (Hb A1C) manually resulted (Completed)    Albumin-Creatinine Ratio, Urine Random      Borderline hyperlipidemia     E78.5    Relevant Orders    CBC    Comprehensive Metabolic Panel    TSH with reflex to Free T4 if abnormal    Lipid Panel      Vitamin D insufficiency     E55.9    Relevant Orders    Vitamin D 25-Hydroxy,Total (for eval of Vitamin D levels)      Screening for malignant neoplasm of prostate     Z12.5    Relevant Orders    PSA      B12 deficiency     E53.8    Relevant Orders    Vitamin B12                 [1]   Patient Active Problem " List  Diagnosis    Agoraphobia    Anxiety    Degeneration of lumbar intervertebral disc    Erectile dysfunction    Fibromyalgia    Gastroesophageal reflux disease    History of pulmonary embolism    Hypertension    Mixed hyperlipidemia    Low back pain with radiation    Obstructive sleep apnea syndrome    Osteoarthritis    Paroxysmal A-fib (Multi)    Postlaminectomy syndrome of lumbosacral region    Sleep related hypoxia    Type 2 diabetes mellitus with peripheral neuropathy    ASCVD (arteriosclerotic cardiovascular disease)    Cellulitis of left lower limb    Cellulitis of left lower extremity    Venous stasis of lower extremity   [2]   Past Medical History:  Diagnosis Date    Allergic     Anxiety     Arthritis 09/01/2023    Clotting disorder (Multi)     Depression     Diabetes mellitus (Multi) 2024    Difficulty walking 2003    HL (hearing loss) 05/01/2023    Hypertension 2000?    VICENTE (obstructive sleep apnea)     Pulmonary embolism     Urinary tract infection 01/01/2015   [3]   Past Surgical History:  Procedure Laterality Date    BACK SURGERY  01/01/2002    CHOLECYSTECTOMY  01/01/2008    HERNIA REPAIR  01/01/2011    JOINT REPLACEMENT  01/01/2010    SPINE SURGERY  01/01/2002    TOENAIL EXCISION  2019    TONSILLECTOMY  01/01/1971    WISDOM TOOTH EXTRACTION  01/01/1998   [4]   Family History  Problem Relation Name Age of Onset    Mental illness Mother Chey     Hypertension Mother Chey     Diabetes Mother Chey     Peripheral vascular disease Father Sd     Diabetes Father Sd     Hypertension Father Sd     Cancer Father Sd     No Known Problems Sister      No Known Problems Daughter      No Known Problems Son      Diabetes Other fam hx     Heart disease Other fam hx     Stroke Other fam hx     Hypertension Other fam hx     Cancer Other fam hx    [5]   Current Outpatient Medications:     acetaminophen (Tylenol) 500 mg tablet, Take 2 tablets (1,000 mg) by mouth every 8 hours if needed., Disp: , Rfl:     albuterol 90  mcg/actuation inhaler, Inhale 1-2 puffs every 6 hours if needed for wheezing or shortness of breath., Disp: 18 g, Rfl: 1    apixaban (Eliquis) 5 mg tablet, Take 1 tablet (5 mg) by mouth 2 times a day., Disp: 180 tablet, Rfl: 1    atorvastatin (Lipitor) 10 mg tablet, Take 1 tablet (10 mg) by mouth once daily., Disp: 90 tablet, Rfl: 3    gabapentin (Neurontin) 600 mg tablet, Take 1 tablet (600 mg) by mouth 2 times a day., Disp: 180 tablet, Rfl: 1    lisinopril 10 mg tablet, TAKE ONE TABLET BY MOUTH EVERY DAY FOR 30 DAYS., Disp: 90 tablet, Rfl: 3    metFORMIN (Glucophage) 500 mg tablet, Take 1 tablet (500 mg) by mouth 2 times a day., Disp: 180 tablet, Rfl: 3    metoprolol tartrate (Lopressor) 25 mg tablet, Take 1 tablet (25 mg) by mouth 2 times a day., Disp: 180 tablet, Rfl: 3    omeprazole (PriLOSEC) 40 mg DR capsule, Take 1 capsule (40 mg) by mouth once daily., Disp: 180 capsule, Rfl: 0    sertraline (Zoloft) 100 mg tablet, Take 1.5 tablets (150 mg) by mouth once daily., Disp: 135 tablet, Rfl: 3    tadalafil (Cialis) 5 mg tablet, Take 1 tablet (5 mg) by mouth once daily., Disp: 30 tablet, Rfl: 11    tadalafil 20 mg tablet, Take 1 tablet (20 mg) by mouth if needed for erectile dysfunction (take 1 pill 1-2hrs before sexual acitivty)., Disp: 5 tablet, Rfl: 11

## 2025-07-07 LAB
ALBUMIN/CREAT UR: 6 MG/G CREAT
CREAT UR-MCNC: 370 MG/DL (ref 20–320)
MICROALBUMIN UR-MCNC: 2.4 MG/DL

## 2025-08-12 ENCOUNTER — OFFICE VISIT (OUTPATIENT)
Dept: ORTHOPEDIC SURGERY | Facility: CLINIC | Age: 65
End: 2025-08-12
Payer: COMMERCIAL

## 2025-08-12 ENCOUNTER — APPOINTMENT (OUTPATIENT)
Dept: PODIATRY | Facility: CLINIC | Age: 65
End: 2025-08-12
Payer: COMMERCIAL

## 2025-08-12 DIAGNOSIS — M17.12 PRIMARY OSTEOARTHRITIS OF LEFT KNEE: Primary | ICD-10-CM

## 2025-08-12 PROCEDURE — 99212 OFFICE O/P EST SF 10 MIN: CPT | Performed by: STUDENT IN AN ORGANIZED HEALTH CARE EDUCATION/TRAINING PROGRAM

## 2025-08-12 PROCEDURE — 99214 OFFICE O/P EST MOD 30 MIN: CPT | Performed by: STUDENT IN AN ORGANIZED HEALTH CARE EDUCATION/TRAINING PROGRAM

## 2025-08-12 PROCEDURE — 4010F ACE/ARB THERAPY RXD/TAKEN: CPT | Performed by: STUDENT IN AN ORGANIZED HEALTH CARE EDUCATION/TRAINING PROGRAM

## 2025-08-12 PROCEDURE — 3044F HG A1C LEVEL LT 7.0%: CPT | Performed by: STUDENT IN AN ORGANIZED HEALTH CARE EDUCATION/TRAINING PROGRAM

## 2025-08-12 ASSESSMENT — PAIN - FUNCTIONAL ASSESSMENT: PAIN_FUNCTIONAL_ASSESSMENT: 0-10

## 2025-08-12 ASSESSMENT — PAIN SCALES - GENERAL: PAINLEVEL_OUTOF10: 7

## 2025-08-13 ENCOUNTER — APPOINTMENT (OUTPATIENT)
Dept: ORTHOPEDIC SURGERY | Facility: CLINIC | Age: 65
End: 2025-08-13
Payer: COMMERCIAL

## 2025-08-14 DIAGNOSIS — M17.12 PRIMARY OSTEOARTHRITIS OF LEFT KNEE: ICD-10-CM

## 2025-08-18 DIAGNOSIS — Z86.711 HISTORY OF PULMONARY EMBOLISM: ICD-10-CM

## 2025-08-18 RX ORDER — APIXABAN 5 MG/1
5 TABLET, FILM COATED ORAL 2 TIMES DAILY
Qty: 180 TABLET | Refills: 1 | Status: SHIPPED | OUTPATIENT
Start: 2025-08-18

## 2025-09-11 ENCOUNTER — APPOINTMENT (OUTPATIENT)
Dept: PRIMARY CARE | Facility: CLINIC | Age: 65
End: 2025-09-11
Payer: COMMERCIAL

## 2025-09-23 ENCOUNTER — APPOINTMENT (OUTPATIENT)
Dept: PODIATRY | Facility: CLINIC | Age: 65
End: 2025-09-23
Payer: COMMERCIAL

## 2025-10-01 ENCOUNTER — APPOINTMENT (OUTPATIENT)
Dept: SLEEP MEDICINE | Facility: CLINIC | Age: 65
End: 2025-10-01
Payer: COMMERCIAL

## 2025-10-15 ENCOUNTER — APPOINTMENT (OUTPATIENT)
Dept: UROLOGY | Facility: CLINIC | Age: 65
End: 2025-10-15
Payer: COMMERCIAL

## 2025-12-09 ENCOUNTER — APPOINTMENT (OUTPATIENT)
Dept: UROLOGY | Facility: CLINIC | Age: 65
End: 2025-12-09
Payer: COMMERCIAL

## 2026-01-05 ENCOUNTER — APPOINTMENT (OUTPATIENT)
Dept: PRIMARY CARE | Facility: CLINIC | Age: 66
End: 2026-01-05
Payer: COMMERCIAL

## 2026-01-13 ENCOUNTER — APPOINTMENT (OUTPATIENT)
Dept: PODIATRY | Facility: CLINIC | Age: 66
End: 2026-01-13
Payer: COMMERCIAL

## 2026-06-09 ENCOUNTER — APPOINTMENT (OUTPATIENT)
Dept: PODIATRY | Facility: CLINIC | Age: 66
End: 2026-06-09
Payer: COMMERCIAL